# Patient Record
Sex: MALE | Race: WHITE | NOT HISPANIC OR LATINO | Employment: OTHER | URBAN - METROPOLITAN AREA
[De-identification: names, ages, dates, MRNs, and addresses within clinical notes are randomized per-mention and may not be internally consistent; named-entity substitution may affect disease eponyms.]

---

## 2023-10-22 PROBLEM — N18.31 STAGE 3A CHRONIC KIDNEY DISEASE (MULTI): Status: ACTIVE | Noted: 2023-10-22

## 2023-10-22 PROBLEM — E66.9 OBESITY: Status: ACTIVE | Noted: 2023-10-22

## 2023-10-22 PROBLEM — I10 HYPERTENSION: Status: ACTIVE | Noted: 2023-10-22

## 2023-10-22 PROBLEM — L91.8 OTHER HYPERTROPHIC DISORDERS OF THE SKIN: Status: ACTIVE | Noted: 2017-01-20

## 2023-10-22 PROBLEM — E11.9 TYPE 2 DIABETES MELLITUS (MULTI): Status: ACTIVE | Noted: 2023-10-22

## 2023-10-22 PROBLEM — N52.9 INABILITY TO ATTAIN ERECTION: Status: ACTIVE | Noted: 2023-10-22

## 2023-10-22 PROBLEM — C64.1 RENAL CELL CARCINOMA OF RIGHT KIDNEY (MULTI): Status: ACTIVE | Noted: 2023-10-22

## 2023-10-22 PROBLEM — N52.9 ORGANIC IMPOTENCE: Status: ACTIVE | Noted: 2023-10-22

## 2023-10-22 RX ORDER — ASPIRIN 81 MG
1 TABLET, DELAYED RELEASE (ENTERIC COATED) ORAL DAILY
COMMUNITY
End: 2024-05-13 | Stop reason: ALTCHOICE

## 2023-10-22 RX ORDER — ESCITALOPRAM OXALATE 10 MG/1
1 TABLET ORAL DAILY
COMMUNITY

## 2023-10-22 RX ORDER — LANOLIN ALCOHOL/MO/W.PET/CERES
1 CREAM (GRAM) TOPICAL DAILY
COMMUNITY

## 2023-10-22 RX ORDER — LISINOPRIL 10 MG/1
1 TABLET ORAL DAILY
COMMUNITY
Start: 2021-05-10 | End: 2024-05-13 | Stop reason: ALTCHOICE

## 2023-10-22 RX ORDER — LORAZEPAM 1 MG/1
1 TABLET ORAL DAILY PRN
COMMUNITY
Start: 2022-01-18 | End: 2024-05-13 | Stop reason: ALTCHOICE

## 2023-10-22 RX ORDER — LOSARTAN POTASSIUM 50 MG/1
1 TABLET ORAL DAILY
COMMUNITY

## 2023-10-23 DIAGNOSIS — C64.1 RENAL CELL CARCINOMA OF RIGHT KIDNEY (MULTI): Primary | ICD-10-CM

## 2023-10-23 RX ORDER — DIPHENHYDRAMINE HYDROCHLORIDE 50 MG/ML
50 INJECTION INTRAMUSCULAR; INTRAVENOUS AS NEEDED
Status: CANCELLED | OUTPATIENT
Start: 2023-11-21

## 2023-10-23 RX ORDER — PROCHLORPERAZINE EDISYLATE 5 MG/ML
10 INJECTION INTRAMUSCULAR; INTRAVENOUS EVERY 6 HOURS PRN
Status: CANCELLED | OUTPATIENT
Start: 2023-12-19

## 2023-10-23 RX ORDER — PROCHLORPERAZINE MALEATE 5 MG
10 TABLET ORAL EVERY 6 HOURS PRN
Status: CANCELLED | OUTPATIENT
Start: 2023-10-24

## 2023-10-23 RX ORDER — EPINEPHRINE 0.3 MG/.3ML
0.3 INJECTION SUBCUTANEOUS EVERY 5 MIN PRN
Status: CANCELLED | OUTPATIENT
Start: 2024-01-16

## 2023-10-23 RX ORDER — PROCHLORPERAZINE EDISYLATE 5 MG/ML
10 INJECTION INTRAMUSCULAR; INTRAVENOUS EVERY 6 HOURS PRN
Status: CANCELLED | OUTPATIENT
Start: 2023-10-24

## 2023-10-23 RX ORDER — FAMOTIDINE 10 MG/ML
20 INJECTION INTRAVENOUS ONCE AS NEEDED
Status: CANCELLED | OUTPATIENT
Start: 2023-10-24

## 2023-10-23 RX ORDER — EPINEPHRINE 0.3 MG/.3ML
0.3 INJECTION SUBCUTANEOUS EVERY 5 MIN PRN
Status: CANCELLED | OUTPATIENT
Start: 2024-02-13

## 2023-10-23 RX ORDER — PROCHLORPERAZINE MALEATE 5 MG
10 TABLET ORAL EVERY 6 HOURS PRN
Status: CANCELLED | OUTPATIENT
Start: 2023-12-19

## 2023-10-23 RX ORDER — LORAZEPAM 0.5 MG/1
0.5 TABLET ORAL ONCE
Status: CANCELLED
Start: 2023-12-19

## 2023-10-23 RX ORDER — LORAZEPAM 0.5 MG/1
0.5 TABLET ORAL ONCE
Status: CANCELLED
Start: 2024-01-16

## 2023-10-23 RX ORDER — HEPARIN 100 UNIT/ML
500 SYRINGE INTRAVENOUS AS NEEDED
Status: CANCELLED | OUTPATIENT
Start: 2023-10-23

## 2023-10-23 RX ORDER — ALBUTEROL SULFATE 0.83 MG/ML
3 SOLUTION RESPIRATORY (INHALATION) AS NEEDED
Status: CANCELLED | OUTPATIENT
Start: 2024-02-13

## 2023-10-23 RX ORDER — PROCHLORPERAZINE EDISYLATE 5 MG/ML
10 INJECTION INTRAMUSCULAR; INTRAVENOUS EVERY 6 HOURS PRN
Status: CANCELLED | OUTPATIENT
Start: 2023-11-21

## 2023-10-23 RX ORDER — EPINEPHRINE 0.3 MG/.3ML
0.3 INJECTION SUBCUTANEOUS EVERY 5 MIN PRN
Status: CANCELLED | OUTPATIENT
Start: 2023-12-19

## 2023-10-23 RX ORDER — LORAZEPAM 0.5 MG/1
0.5 TABLET ORAL ONCE
Status: CANCELLED
Start: 2023-10-23

## 2023-10-23 RX ORDER — FAMOTIDINE 10 MG/ML
20 INJECTION INTRAVENOUS ONCE AS NEEDED
Status: CANCELLED | OUTPATIENT
Start: 2023-11-21

## 2023-10-23 RX ORDER — PROCHLORPERAZINE EDISYLATE 5 MG/ML
10 INJECTION INTRAMUSCULAR; INTRAVENOUS EVERY 6 HOURS PRN
Status: CANCELLED | OUTPATIENT
Start: 2024-01-16

## 2023-10-23 RX ORDER — LORAZEPAM 0.5 MG/1
0.5 TABLET ORAL ONCE
Status: CANCELLED
Start: 2024-02-13

## 2023-10-23 RX ORDER — FAMOTIDINE 10 MG/ML
20 INJECTION INTRAVENOUS ONCE AS NEEDED
Status: CANCELLED | OUTPATIENT
Start: 2024-01-16

## 2023-10-23 RX ORDER — DIPHENHYDRAMINE HYDROCHLORIDE 50 MG/ML
50 INJECTION INTRAMUSCULAR; INTRAVENOUS AS NEEDED
Status: CANCELLED | OUTPATIENT
Start: 2024-01-16

## 2023-10-23 RX ORDER — FAMOTIDINE 10 MG/ML
20 INJECTION INTRAVENOUS ONCE AS NEEDED
Status: CANCELLED | OUTPATIENT
Start: 2024-02-13

## 2023-10-23 RX ORDER — DIPHENHYDRAMINE HYDROCHLORIDE 50 MG/ML
50 INJECTION INTRAMUSCULAR; INTRAVENOUS AS NEEDED
Status: CANCELLED | OUTPATIENT
Start: 2023-12-19

## 2023-10-23 RX ORDER — FAMOTIDINE 10 MG/ML
20 INJECTION INTRAVENOUS ONCE AS NEEDED
Status: CANCELLED | OUTPATIENT
Start: 2023-12-19

## 2023-10-23 RX ORDER — PROCHLORPERAZINE MALEATE 5 MG
10 TABLET ORAL EVERY 6 HOURS PRN
Status: CANCELLED | OUTPATIENT
Start: 2023-11-21

## 2023-10-23 RX ORDER — DIPHENHYDRAMINE HYDROCHLORIDE 50 MG/ML
50 INJECTION INTRAMUSCULAR; INTRAVENOUS AS NEEDED
Status: CANCELLED | OUTPATIENT
Start: 2024-02-13

## 2023-10-23 RX ORDER — LORAZEPAM 0.5 MG/1
0.5 TABLET ORAL ONCE
Status: CANCELLED
Start: 2023-11-21

## 2023-10-23 RX ORDER — ALBUTEROL SULFATE 0.83 MG/ML
3 SOLUTION RESPIRATORY (INHALATION) AS NEEDED
Status: CANCELLED | OUTPATIENT
Start: 2023-10-24

## 2023-10-23 RX ORDER — ALBUTEROL SULFATE 0.83 MG/ML
3 SOLUTION RESPIRATORY (INHALATION) AS NEEDED
Status: CANCELLED | OUTPATIENT
Start: 2023-11-21

## 2023-10-23 RX ORDER — ALBUTEROL SULFATE 0.83 MG/ML
3 SOLUTION RESPIRATORY (INHALATION) AS NEEDED
Status: CANCELLED | OUTPATIENT
Start: 2023-12-19

## 2023-10-23 RX ORDER — EPINEPHRINE 0.3 MG/.3ML
0.3 INJECTION SUBCUTANEOUS EVERY 5 MIN PRN
Status: CANCELLED | OUTPATIENT
Start: 2023-11-21

## 2023-10-23 RX ORDER — PROCHLORPERAZINE EDISYLATE 5 MG/ML
10 INJECTION INTRAMUSCULAR; INTRAVENOUS EVERY 6 HOURS PRN
Status: CANCELLED | OUTPATIENT
Start: 2024-02-13

## 2023-10-23 RX ORDER — EPINEPHRINE 0.3 MG/.3ML
0.3 INJECTION SUBCUTANEOUS EVERY 5 MIN PRN
Status: CANCELLED | OUTPATIENT
Start: 2023-10-24

## 2023-10-23 RX ORDER — ALBUTEROL SULFATE 0.83 MG/ML
3 SOLUTION RESPIRATORY (INHALATION) AS NEEDED
Status: CANCELLED | OUTPATIENT
Start: 2024-01-16

## 2023-10-23 RX ORDER — PROCHLORPERAZINE MALEATE 5 MG
10 TABLET ORAL EVERY 6 HOURS PRN
Status: CANCELLED | OUTPATIENT
Start: 2024-01-16

## 2023-10-23 RX ORDER — DIPHENHYDRAMINE HYDROCHLORIDE 50 MG/ML
50 INJECTION INTRAMUSCULAR; INTRAVENOUS AS NEEDED
Status: CANCELLED | OUTPATIENT
Start: 2023-10-24

## 2023-10-23 RX ORDER — PROCHLORPERAZINE MALEATE 5 MG
10 TABLET ORAL EVERY 6 HOURS PRN
Status: CANCELLED | OUTPATIENT
Start: 2024-02-13

## 2023-10-23 RX ORDER — HEPARIN SODIUM,PORCINE/PF 10 UNIT/ML
50 SYRINGE (ML) INTRAVENOUS AS NEEDED
Status: CANCELLED | OUTPATIENT
Start: 2023-10-23

## 2023-10-24 ENCOUNTER — INFUSION (OUTPATIENT)
Dept: HEMATOLOGY/ONCOLOGY | Facility: CLINIC | Age: 63
End: 2023-10-24
Payer: COMMERCIAL

## 2023-10-24 VITALS
HEIGHT: 69 IN | RESPIRATION RATE: 17 BRPM | TEMPERATURE: 97 F | BODY MASS INDEX: 38.6 KG/M2 | OXYGEN SATURATION: 97 % | HEART RATE: 77 BPM | DIASTOLIC BLOOD PRESSURE: 89 MMHG | SYSTOLIC BLOOD PRESSURE: 156 MMHG | WEIGHT: 260.58 LBS

## 2023-10-24 DIAGNOSIS — C64.1 RENAL CELL CARCINOMA OF RIGHT KIDNEY (MULTI): ICD-10-CM

## 2023-10-24 LAB
ALBUMIN SERPL BCP-MCNC: 4 G/DL (ref 3.4–5)
ALP SERPL-CCNC: 59 U/L (ref 33–136)
ALT SERPL W P-5'-P-CCNC: 15 U/L (ref 10–52)
ANION GAP SERPL CALC-SCNC: 13 MMOL/L (ref 10–20)
AST SERPL W P-5'-P-CCNC: 19 U/L (ref 9–39)
BASOPHILS # BLD AUTO: 0.05 X10*3/UL (ref 0–0.1)
BASOPHILS NFR BLD AUTO: 0.9 %
BILIRUB SERPL-MCNC: 0.5 MG/DL (ref 0–1.2)
BUN SERPL-MCNC: 20 MG/DL (ref 6–23)
CALCIUM SERPL-MCNC: 8.6 MG/DL (ref 8.6–10.3)
CHLORIDE SERPL-SCNC: 105 MMOL/L (ref 98–107)
CO2 SERPL-SCNC: 26 MMOL/L (ref 21–32)
CREAT SERPL-MCNC: 1.39 MG/DL (ref 0.5–1.3)
EOSINOPHIL # BLD AUTO: 0.3 X10*3/UL (ref 0–0.7)
EOSINOPHIL NFR BLD AUTO: 5.2 %
ERYTHROCYTE [DISTWIDTH] IN BLOOD BY AUTOMATED COUNT: 12.7 % (ref 11.5–14.5)
GFR SERPL CREATININE-BSD FRML MDRD: 57 ML/MIN/1.73M*2
GLUCOSE SERPL-MCNC: 132 MG/DL (ref 74–99)
HCT VFR BLD AUTO: 43.9 % (ref 41–52)
HGB BLD-MCNC: 15 G/DL (ref 13.5–17.5)
IMM GRANULOCYTES # BLD AUTO: 0.01 X10*3/UL (ref 0–0.7)
IMM GRANULOCYTES NFR BLD AUTO: 0.2 % (ref 0–0.9)
LYMPHOCYTES # BLD AUTO: 1.29 X10*3/UL (ref 1.2–4.8)
LYMPHOCYTES NFR BLD AUTO: 22.6 %
MCH RBC QN AUTO: 30.5 PG (ref 26–34)
MCHC RBC AUTO-ENTMCNC: 34.2 G/DL (ref 32–36)
MCV RBC AUTO: 89 FL (ref 80–100)
MONOCYTES # BLD AUTO: 0.56 X10*3/UL (ref 0.1–1)
MONOCYTES NFR BLD AUTO: 9.8 %
NEUTROPHILS # BLD AUTO: 3.51 X10*3/UL (ref 1.2–7.7)
NEUTROPHILS NFR BLD AUTO: 61.3 %
PLATELET # BLD AUTO: 177 X10*3/UL (ref 150–450)
PMV BLD AUTO: 9.6 FL (ref 7.5–11.5)
POTASSIUM SERPL-SCNC: 3.9 MMOL/L (ref 3.5–5.3)
PROT SERPL-MCNC: 6.4 G/DL (ref 6.4–8.2)
RBC # BLD AUTO: 4.92 X10*6/UL (ref 4.5–5.9)
SODIUM SERPL-SCNC: 140 MMOL/L (ref 136–145)
T4 FREE SERPL-MCNC: 0.87 NG/DL (ref 0.61–1.12)
T4 FREE SERPL-MCNC: 0.94 NG/DL (ref 0.61–1.12)
TSH SERPL-ACNC: 0.39 MIU/L (ref 0.44–3.98)
TSH SERPL-ACNC: 0.4 MIU/L (ref 0.44–3.98)
WBC # BLD AUTO: 5.7 X10*3/UL (ref 4.4–11.3)

## 2023-10-24 PROCEDURE — 86706 HEP B SURFACE ANTIBODY: CPT

## 2023-10-24 PROCEDURE — 80053 COMPREHEN METABOLIC PANEL: CPT

## 2023-10-24 PROCEDURE — 86704 HEP B CORE ANTIBODY TOTAL: CPT

## 2023-10-24 PROCEDURE — 87340 HEPATITIS B SURFACE AG IA: CPT

## 2023-10-24 PROCEDURE — 85025 COMPLETE CBC W/AUTO DIFF WBC: CPT

## 2023-10-24 PROCEDURE — 84439 ASSAY OF FREE THYROXINE: CPT

## 2023-10-24 PROCEDURE — 36415 COLL VENOUS BLD VENIPUNCTURE: CPT

## 2023-10-24 PROCEDURE — 82533 TOTAL CORTISOL: CPT

## 2023-10-24 PROCEDURE — 2500000004 HC RX 250 GENERAL PHARMACY W/ HCPCS (ALT 636 FOR OP/ED): Mod: JZ | Performed by: INTERNAL MEDICINE

## 2023-10-24 PROCEDURE — 2500000001 HC RX 250 WO HCPCS SELF ADMINISTERED DRUGS (ALT 637 FOR MEDICARE OP): Performed by: INTERNAL MEDICINE

## 2023-10-24 PROCEDURE — 82024 ASSAY OF ACTH: CPT

## 2023-10-24 PROCEDURE — 96375 TX/PRO/DX INJ NEW DRUG ADDON: CPT | Mod: INF

## 2023-10-24 PROCEDURE — 2500000004 HC RX 250 GENERAL PHARMACY W/ HCPCS (ALT 636 FOR OP/ED): Performed by: INTERNAL MEDICINE

## 2023-10-24 PROCEDURE — 84443 ASSAY THYROID STIM HORMONE: CPT

## 2023-10-24 PROCEDURE — 96413 CHEMO IV INFUSION 1 HR: CPT

## 2023-10-24 RX ORDER — FAMOTIDINE 10 MG/ML
20 INJECTION INTRAVENOUS ONCE AS NEEDED
Status: DISCONTINUED | OUTPATIENT
Start: 2023-10-24 | End: 2023-10-24 | Stop reason: HOSPADM

## 2023-10-24 RX ORDER — EPINEPHRINE 0.3 MG/.3ML
0.3 INJECTION SUBCUTANEOUS EVERY 5 MIN PRN
Status: DISCONTINUED | OUTPATIENT
Start: 2023-10-24 | End: 2023-10-24 | Stop reason: HOSPADM

## 2023-10-24 RX ORDER — HEPARIN SODIUM,PORCINE/PF 10 UNIT/ML
50 SYRINGE (ML) INTRAVENOUS AS NEEDED
Status: CANCELLED | OUTPATIENT
Start: 2023-10-24

## 2023-10-24 RX ORDER — HEPARIN 100 UNIT/ML
500 SYRINGE INTRAVENOUS AS NEEDED
Status: CANCELLED | OUTPATIENT
Start: 2023-10-24

## 2023-10-24 RX ORDER — DIPHENHYDRAMINE HYDROCHLORIDE 50 MG/ML
50 INJECTION INTRAMUSCULAR; INTRAVENOUS AS NEEDED
Status: DISCONTINUED | OUTPATIENT
Start: 2023-10-24 | End: 2023-10-24 | Stop reason: HOSPADM

## 2023-10-24 RX ORDER — ALBUTEROL SULFATE 0.83 MG/ML
3 SOLUTION RESPIRATORY (INHALATION) AS NEEDED
Status: DISCONTINUED | OUTPATIENT
Start: 2023-10-24 | End: 2023-10-24 | Stop reason: HOSPADM

## 2023-10-24 RX ORDER — LORAZEPAM 0.5 MG/1
0.5 TABLET ORAL ONCE
Status: COMPLETED | OUTPATIENT
Start: 2023-10-24 | End: 2023-10-24

## 2023-10-24 RX ADMIN — LORAZEPAM 0.5 MG: 0.5 TABLET ORAL at 15:45

## 2023-10-24 RX ADMIN — DEXAMETHASONE SODIUM PHOSPHATE 8 MG: 4 INJECTION INTRA-ARTICULAR; INTRALESIONAL; INTRAMUSCULAR; INTRAVENOUS; SOFT TISSUE at 15:45

## 2023-10-24 RX ADMIN — SODIUM CHLORIDE 480 MG: 9 INJECTION, SOLUTION INTRAVENOUS at 16:09

## 2023-10-24 NOTE — SIGNIFICANT EVENT
10/24/23 1535   Prechemo Checklist   Has the patient been in the hospital, ED, or urgent care since last date of service No   Chemo/Immuno Consent Signed Yes   Protocol/Indications Verified Yes   Confirmed to previous date/time of medication Yes   Compared to previous dose Yes   Pregnancy Test Negative Not applicable   BSA/Weight-Height Verified Yes   Dose Calculations Verified Yes

## 2023-10-25 LAB
CORTIS AM PEAK SERPL-MSCNC: 6.2 UG/DL (ref 5–20)
HBV CORE AB SER QL: NONREACTIVE
HBV SURFACE AB SER-ACNC: <3.1 MIU/ML
HBV SURFACE AG SERPL QL IA: NONREACTIVE

## 2023-10-26 LAB — ACTH PLAS-MCNC: 187 PG/ML (ref 7.2–63.3)

## 2023-11-14 ENCOUNTER — APPOINTMENT (OUTPATIENT)
Dept: NEPHROLOGY | Facility: CLINIC | Age: 63
End: 2023-11-14
Payer: COMMERCIAL

## 2023-11-15 ENCOUNTER — APPOINTMENT (OUTPATIENT)
Dept: NEPHROLOGY | Facility: CLINIC | Age: 63
End: 2023-11-15
Payer: COMMERCIAL

## 2023-11-20 ENCOUNTER — TELEPHONE (OUTPATIENT)
Dept: RADIATION ONCOLOGY | Facility: HOSPITAL | Age: 63
End: 2023-11-20
Payer: COMMERCIAL

## 2023-11-21 ENCOUNTER — INFUSION (OUTPATIENT)
Dept: HEMATOLOGY/ONCOLOGY | Facility: CLINIC | Age: 63
End: 2023-11-21
Payer: COMMERCIAL

## 2023-11-21 ENCOUNTER — HOSPITAL ENCOUNTER (OUTPATIENT)
Dept: RADIOLOGY | Facility: HOSPITAL | Age: 63
Discharge: HOME | End: 2023-11-21
Payer: COMMERCIAL

## 2023-11-21 ENCOUNTER — HOSPITAL ENCOUNTER (OUTPATIENT)
Dept: RADIATION ONCOLOGY | Facility: HOSPITAL | Age: 63
Setting detail: RADIATION/ONCOLOGY SERIES
Discharge: HOME | End: 2023-11-21
Payer: COMMERCIAL

## 2023-11-21 VITALS
HEIGHT: 69 IN | WEIGHT: 261.69 LBS | RESPIRATION RATE: 18 BRPM | OXYGEN SATURATION: 95 % | SYSTOLIC BLOOD PRESSURE: 144 MMHG | HEART RATE: 73 BPM | TEMPERATURE: 96.8 F | DIASTOLIC BLOOD PRESSURE: 91 MMHG | BODY MASS INDEX: 38.76 KG/M2

## 2023-11-21 VITALS
BODY MASS INDEX: 38.13 KG/M2 | SYSTOLIC BLOOD PRESSURE: 167 MMHG | DIASTOLIC BLOOD PRESSURE: 103 MMHG | RESPIRATION RATE: 16 BRPM | HEART RATE: 84 BPM | WEIGHT: 260.69 LBS | TEMPERATURE: 97.7 F | OXYGEN SATURATION: 98 %

## 2023-11-21 DIAGNOSIS — C64.1 RENAL CELL CARCINOMA OF RIGHT KIDNEY (MULTI): Primary | ICD-10-CM

## 2023-11-21 DIAGNOSIS — C78.7 SECONDARY MALIGNANT NEOPLASM OF LIVER AND INTRAHEPATIC BILE DUCT (MULTI): ICD-10-CM

## 2023-11-21 DIAGNOSIS — C64.1 RENAL CELL CARCINOMA OF RIGHT KIDNEY (MULTI): ICD-10-CM

## 2023-11-21 LAB
ALBUMIN SERPL BCP-MCNC: 3.9 G/DL (ref 3.4–5)
ALP SERPL-CCNC: 55 U/L (ref 33–136)
ALT SERPL W P-5'-P-CCNC: 16 U/L (ref 10–52)
ANION GAP SERPL CALC-SCNC: 15 MMOL/L (ref 10–20)
AST SERPL W P-5'-P-CCNC: 15 U/L (ref 9–39)
BASOPHILS # BLD AUTO: 0.06 X10*3/UL (ref 0–0.1)
BASOPHILS NFR BLD AUTO: 1.1 %
BILIRUB SERPL-MCNC: 0.4 MG/DL (ref 0–1.2)
BUN SERPL-MCNC: 18 MG/DL (ref 6–23)
CALCIUM SERPL-MCNC: 9.2 MG/DL (ref 8.6–10.3)
CHLORIDE SERPL-SCNC: 102 MMOL/L (ref 98–107)
CO2 SERPL-SCNC: 28 MMOL/L (ref 21–32)
CREAT SERPL-MCNC: 1.35 MG/DL (ref 0.5–1.3)
EOSINOPHIL # BLD AUTO: 0.29 X10*3/UL (ref 0–0.7)
EOSINOPHIL NFR BLD AUTO: 5.3 %
ERYTHROCYTE [DISTWIDTH] IN BLOOD BY AUTOMATED COUNT: 12.8 % (ref 11.5–14.5)
GFR SERPL CREATININE-BSD FRML MDRD: 59 ML/MIN/1.73M*2
GLUCOSE SERPL-MCNC: 153 MG/DL (ref 74–99)
HCT VFR BLD AUTO: 44.7 % (ref 41–52)
HGB BLD-MCNC: 15.3 G/DL (ref 13.5–17.5)
IMM GRANULOCYTES # BLD AUTO: 0.03 X10*3/UL (ref 0–0.7)
IMM GRANULOCYTES NFR BLD AUTO: 0.6 % (ref 0–0.9)
LYMPHOCYTES # BLD AUTO: 1.28 X10*3/UL (ref 1.2–4.8)
LYMPHOCYTES NFR BLD AUTO: 23.6 %
MCH RBC QN AUTO: 30.7 PG (ref 26–34)
MCHC RBC AUTO-ENTMCNC: 34.2 G/DL (ref 32–36)
MCV RBC AUTO: 90 FL (ref 80–100)
MONOCYTES # BLD AUTO: 0.37 X10*3/UL (ref 0.1–1)
MONOCYTES NFR BLD AUTO: 6.8 %
NEUTROPHILS # BLD AUTO: 3.4 X10*3/UL (ref 1.2–7.7)
NEUTROPHILS NFR BLD AUTO: 62.6 %
NRBC BLD-RTO: 0 /100 WBCS (ref 0–0)
PLATELET # BLD AUTO: 204 X10*3/UL (ref 150–450)
POTASSIUM SERPL-SCNC: 3.7 MMOL/L (ref 3.5–5.3)
PROT SERPL-MCNC: 6.4 G/DL (ref 6.4–8.2)
RBC # BLD AUTO: 4.99 X10*6/UL (ref 4.5–5.9)
SODIUM SERPL-SCNC: 141 MMOL/L (ref 136–145)
TSH SERPL-ACNC: 0.56 MIU/L (ref 0.44–3.98)
WBC # BLD AUTO: 5.4 X10*3/UL (ref 4.4–11.3)

## 2023-11-21 PROCEDURE — 36415 COLL VENOUS BLD VENIPUNCTURE: CPT

## 2023-11-21 PROCEDURE — 99213 OFFICE O/P EST LOW 20 MIN: CPT | Mod: GC | Performed by: RADIOLOGY

## 2023-11-21 PROCEDURE — 85025 COMPLETE CBC W/AUTO DIFF WBC: CPT

## 2023-11-21 PROCEDURE — 74177 CT ABD & PELVIS W/CONTRAST: CPT | Performed by: STUDENT IN AN ORGANIZED HEALTH CARE EDUCATION/TRAINING PROGRAM

## 2023-11-21 PROCEDURE — 2550000001 HC RX 255 CONTRASTS: Performed by: RADIOLOGY

## 2023-11-21 PROCEDURE — 82024 ASSAY OF ACTH: CPT

## 2023-11-21 PROCEDURE — 96413 CHEMO IV INFUSION 1 HR: CPT

## 2023-11-21 PROCEDURE — 82533 TOTAL CORTISOL: CPT

## 2023-11-21 PROCEDURE — 80053 COMPREHEN METABOLIC PANEL: CPT

## 2023-11-21 PROCEDURE — 2500000004 HC RX 250 GENERAL PHARMACY W/ HCPCS (ALT 636 FOR OP/ED): Performed by: INTERNAL MEDICINE

## 2023-11-21 PROCEDURE — 2500000001 HC RX 250 WO HCPCS SELF ADMINISTERED DRUGS (ALT 637 FOR MEDICARE OP): Performed by: INTERNAL MEDICINE

## 2023-11-21 PROCEDURE — 96375 TX/PRO/DX INJ NEW DRUG ADDON: CPT | Mod: INF

## 2023-11-21 PROCEDURE — 84443 ASSAY THYROID STIM HORMONE: CPT

## 2023-11-21 PROCEDURE — 74177 CT ABD & PELVIS W/CONTRAST: CPT

## 2023-11-21 PROCEDURE — 2500000004 HC RX 250 GENERAL PHARMACY W/ HCPCS (ALT 636 FOR OP/ED): Mod: JZ | Performed by: INTERNAL MEDICINE

## 2023-11-21 PROCEDURE — 99213 OFFICE O/P EST LOW 20 MIN: CPT | Performed by: RADIOLOGY

## 2023-11-21 RX ORDER — ALBUTEROL SULFATE 0.83 MG/ML
3 SOLUTION RESPIRATORY (INHALATION) AS NEEDED
Status: DISCONTINUED | OUTPATIENT
Start: 2023-11-21 | End: 2023-11-21 | Stop reason: HOSPADM

## 2023-11-21 RX ORDER — PROCHLORPERAZINE MALEATE 10 MG
10 TABLET ORAL EVERY 6 HOURS PRN
Status: DISCONTINUED | OUTPATIENT
Start: 2023-11-21 | End: 2023-11-21 | Stop reason: HOSPADM

## 2023-11-21 RX ORDER — FAMOTIDINE 10 MG/ML
20 INJECTION INTRAVENOUS ONCE AS NEEDED
Status: DISCONTINUED | OUTPATIENT
Start: 2023-11-21 | End: 2023-11-21 | Stop reason: HOSPADM

## 2023-11-21 RX ORDER — EPINEPHRINE 0.3 MG/.3ML
0.3 INJECTION SUBCUTANEOUS EVERY 5 MIN PRN
Status: DISCONTINUED | OUTPATIENT
Start: 2023-11-21 | End: 2023-11-21 | Stop reason: HOSPADM

## 2023-11-21 RX ORDER — DIPHENHYDRAMINE HYDROCHLORIDE 50 MG/ML
50 INJECTION INTRAMUSCULAR; INTRAVENOUS AS NEEDED
Status: DISCONTINUED | OUTPATIENT
Start: 2023-11-21 | End: 2023-11-21 | Stop reason: HOSPADM

## 2023-11-21 RX ORDER — PROCHLORPERAZINE EDISYLATE 5 MG/ML
10 INJECTION INTRAMUSCULAR; INTRAVENOUS EVERY 6 HOURS PRN
Status: DISCONTINUED | OUTPATIENT
Start: 2023-11-21 | End: 2023-11-21 | Stop reason: HOSPADM

## 2023-11-21 RX ORDER — LORAZEPAM 0.5 MG/1
0.5 TABLET ORAL ONCE
Status: COMPLETED | OUTPATIENT
Start: 2023-11-21 | End: 2023-11-21

## 2023-11-21 RX ORDER — DEXAMETHASONE SODIUM PHOSPHATE 4 MG/ML
8 INJECTION, SOLUTION INTRA-ARTICULAR; INTRALESIONAL; INTRAMUSCULAR; INTRAVENOUS; SOFT TISSUE ONCE
Status: COMPLETED | OUTPATIENT
Start: 2023-11-21 | End: 2023-11-21

## 2023-11-21 RX ADMIN — IOHEXOL 75 ML: 350 INJECTION, SOLUTION INTRAVENOUS at 11:05

## 2023-11-21 RX ADMIN — LORAZEPAM 0.5 MG: 0.5 TABLET ORAL at 15:49

## 2023-11-21 RX ADMIN — SODIUM CHLORIDE 480 MG: 9 INJECTION, SOLUTION INTRAVENOUS at 16:07

## 2023-11-21 RX ADMIN — DEXAMETHASONE SODIUM PHOSPHATE 8 MG: 4 INJECTION INTRA-ARTICULAR; INTRALESIONAL; INTRAMUSCULAR; INTRAVENOUS; SOFT TISSUE at 15:51

## 2023-11-21 ASSESSMENT — PATIENT HEALTH QUESTIONNAIRE - PHQ9
1. LITTLE INTEREST OR PLEASURE IN DOING THINGS: NOT AT ALL
SUM OF ALL RESPONSES TO PHQ9 QUESTIONS 1 AND 2: 0
2. FEELING DOWN, DEPRESSED OR HOPELESS: NOT AT ALL

## 2023-11-21 ASSESSMENT — ENCOUNTER SYMPTOMS
LOSS OF SENSATION IN FEET: 0
DEPRESSION: 0
OCCASIONAL FEELINGS OF UNSTEADINESS: 0

## 2023-11-21 ASSESSMENT — COLUMBIA-SUICIDE SEVERITY RATING SCALE - C-SSRS
1. IN THE PAST MONTH, HAVE YOU WISHED YOU WERE DEAD OR WISHED YOU COULD GO TO SLEEP AND NOT WAKE UP?: NO
2. HAVE YOU ACTUALLY HAD ANY THOUGHTS OF KILLING YOURSELF?: NO
6. HAVE YOU EVER DONE ANYTHING, STARTED TO DO ANYTHING, OR PREPARED TO DO ANYTHING TO END YOUR LIFE?: NO

## 2023-11-21 ASSESSMENT — PAIN SCALES - GENERAL: PAINLEVEL: 0-NO PAIN

## 2023-11-21 NOTE — SIGNIFICANT EVENT
11/21/23 1532   Prechemo Checklist   Has the patient been in the hospital, ED, or urgent care since last date of service No   Chemo/Immuno Consent Signed Yes   Protocol/Indications Verified Yes   Confirmed to previous date/time of medication Yes   Compared to previous dose Yes   All medications are dated accurately Yes   Pregnancy Test Negative Not applicable   Parameters Met Yes   BSA/Weight-Height Verified Yes   Dose Calculations Verified Yes

## 2023-11-21 NOTE — PROGRESS NOTES
Staff Physician: Breana Arora MD  Referring Physician: Jadiel Gonsalez MD  Date of Service: 11/21/2023  RADIATION ONCOLOGY FOLLOW UP NOTE  IDENTIFYING DATA:   Cancer Staging   No matching staging information was found for the patient.  Problem List Items Addressed This Visit       Renal cell carcinoma of right kidney (CMS/HCC) - Primary    Relevant Orders    CT chest abdomen pelvis w IV contrast    CBC and Auto Differential    Comprehensive Metabolic Panel    Clinic Appointment Request Follow Up; BREANA ARORA; [facility] [address] [facility]       Mr. Burns is a 62-year-old man with originally [id number] Stage III renal cell carcinoma, status post right nephrectomy, with subsequent recurrence at the resection bed with direct extension to the liver, status-post resection and partial hepatectomy,  then placed on immunotherapy, who developed a second recurrence in the soft tissue at the nephrectomy bed. He then completed SBRT to 50Gy in 5fx to the soft tissue recurrence (5.6cm in max diameter at that time), with COT on 05/19/2023. He presents today for routine interval follow-up.    INTERVAL HISTORY  Since we last saw Jason Burns in clinic on 8/22/23, he has felt well. His weight is stable from last visit and His appetite is unchanged. He denies any significant symptoms at this time. Specifically, he denies abdominal pain, nausea, vomiting, diarrhea, jaundice, dark urine, or light stools. He has had no new medical problems or medications since our last visit. His last imaging, comprising CT A/P on 11/21/23, demonstrates interval decrease in size in the soft-tissue recurrence, now measuring nearly 1.6x1.5cm from prior (pending final radiology read).       PAST MEDICAL, SURGICAL, FAMILY, AND SOCIAL HISTORY:  Past Medical History:   Diagnosis Date    Campylobacter enteritis 11/02/2018    Campylobacter gastroenteritis    Personal history of other diseases of the digestive system 08/11/2016    History of umbilical hernia     Plantar fascial fibromatosis 08/19/2015    Plantar fasciitis    Renal cell cancer (CMS/HCC)     Unspecified sprain of unspecified great toe, initial encounter 08/19/2015    Sprain of great toe     Past Surgical History:   Procedure Laterality Date    COLONOSCOPY  10/21/2013    Complete Colonoscopy    CT ABDOMEN PELVIS ANGIOGRAM W AND/OR WO IV CONTRAST  2/7/2021    CT ABDOMEN PELVIS ANGIOGRAM W AND/OR WO IV CONTRAST 2/7/2021 [facility]    CT ABDOMEN PELVIS ANGIOGRAM W AND/OR WO IV CONTRAST  2/10/2021    CT ABDOMEN PELVIS ANGIOGRAM W AND/OR WO IV CONTRAST 2/10/2021 [facility]    UMBILICAL HERNIA REPAIR  04/25/2018    Umbilical Hernia Repair    US GUIDED NEEDLE LIVER BIOPSY  10/12/2020    US GUIDED NEEDLE LIVER BIOPSY 10/12/2020 [facility]     ALLERGIES:  No Known Allergies  MEDICATIONS:    Current Outpatient Medications:     cyanocobalamin (Vitamin B-12) 1,000 mcg tablet, Take 1 tablet (1,000 mcg) by mouth once daily., Disp: , Rfl:     escitalopram (Lexapro) 10 mg tablet, Take 1 tablet (10 mg) by mouth once daily., Disp: , Rfl:     lisinopril 10 mg tablet, Take 1 tablet (10 mg) by mouth once daily., Disp: , Rfl:     losartan (Cozaar) 50 mg tablet, Take 1 tablet (50 mg) by mouth once daily., Disp: , Rfl:     multivitamin (Daily Vitamin Formula) tablet, Take 1 tablet by mouth once daily., Disp: , Rfl:     amLODIPine (Norvasc) 10 mg tablet, Take 1 tablet (10 mg) by mouth once daily., Disp: 90 tablet, Rfl: 3    LORazepam (Ativan) 1 mg tablet, Take 1 tablet (1 mg) by mouth once daily as needed., Disp: , Rfl:     TIRZEPATIDE SUBQ, Inject under the skin 1 (one) time per week., Disp: , Rfl:      REVIEW OF SYSTEMS:  Except for the symptoms described in the interval history, the review of systems is negative. Specifically, except as noted, when asked the patient expressed no complaints relative to constitutional (fever, weight loss), eyes, ears, nose, mouth, throat, neurologic, cardiovascular,  "pulmonary, breast, GI, , skin, musculoskeletal, endocrine, hematologic/lymphatic, or immunologic systems.    PERFORMANCE STATUS:  Karnofsky Performance Score/ECO, Normal activity with effort; some signs or symptoms of disease (ECOG equivalent 1)    PHYSICAL EXAMINATION:  BP (!) 144/91 (BP Location: Left arm, Patient Position: Standing, BP Cuff Size: Large adult)   Pulse 73   Temp 36 °C (96.8 °F) (Temporal)   Resp 18   Ht 1.761 m (5' 9.33\")   Wt 119 kg (261 lb 11 oz)   SpO2 95%   BMI 38.28 kg/m²   Pain score: 0/10  General: no acute distress, engaged in conversation. No jaundice.  HEENT: Normocephalic, atraumatic. Extraocular movements are intact.   Neck: supple with trachea at midline, no palpable adenopathy.   Pulmonary: Breathing comfortably on room air, no respiratory distress  Cardiovascular: Regular rate, no cyanosis, well-perfused  Abdomen: Soft, nontender, nondistended.   Extremities: No lower extremity edema or cyanosis.   Musculoskeletal: Normal range of motion. Able to raise both arms above head without issues  Skin: Without rash or obvious lesions.   Neurologic: Alert and oriented x3. Cranial nerves grossly intact.       DIAGNOSTIC REPORTS REVIEWED:  Imaging: All imaging was personally reviewed and interpreted in clinic. Findings as per interval history and EMR.  Laboratory/Pathology:  All pertinent labs and pathology were personally reviewed and interpreted in clinic. Findings as per interval history and EMR.   No results found for: \"CEA\" No results found for: \"AFP\"    IMPRESSION:  Ms. Burns has recovered nicely from radiation therapy without residual adverse effects and with substantial imaging related response (pending final radiology read).     PLAN:  I have asked Jason Burns to please return to clinic in 3 month's time with a repeat imaging of CT CAP at that time. He knows to call with any questions or concerns in the interim.    PAIN PLAN: The patient reports their pain " is well-controlled on their current regimen.  Their pain regimen is currently managed by Primary Care.      DISEASE STATUS: Controlled  NEW METACHRONOUS CANCER: No    Thank you for the opportunity to participate in the ongoing care of this pleasant man.    David Mcfarlane MD  PGY-3 Radiation Oncology  11/21/2023

## 2023-11-22 LAB — CORTIS AM PEAK SERPL-MSCNC: 3.7 UG/DL (ref 5–20)

## 2023-11-23 LAB — ACTH PLAS-MCNC: 35.4 PG/ML (ref 7.2–63.3)

## 2023-11-30 ENCOUNTER — TELEMEDICINE (OUTPATIENT)
Dept: NEPHROLOGY | Facility: CLINIC | Age: 63
End: 2023-11-30
Payer: COMMERCIAL

## 2023-11-30 VITALS — WEIGHT: 255 LBS | HEIGHT: 70 IN | RESPIRATION RATE: 16 BRPM | BODY MASS INDEX: 36.51 KG/M2

## 2023-11-30 DIAGNOSIS — C64.1 RENAL CELL CARCINOMA OF RIGHT KIDNEY (MULTI): ICD-10-CM

## 2023-11-30 DIAGNOSIS — E11.00 TYPE 2 DIABETES MELLITUS WITH HYPEROSMOLARITY WITHOUT COMA, WITHOUT LONG-TERM CURRENT USE OF INSULIN (MULTI): ICD-10-CM

## 2023-11-30 DIAGNOSIS — N18.31 STAGE 3A CHRONIC KIDNEY DISEASE (MULTI): Primary | ICD-10-CM

## 2023-11-30 PROCEDURE — 99215 OFFICE O/P EST HI 40 MIN: CPT | Performed by: INTERNAL MEDICINE

## 2023-11-30 RX ORDER — AMLODIPINE BESYLATE 10 MG/1
10 TABLET ORAL DAILY
Qty: 90 TABLET | Refills: 3 | Status: SHIPPED | OUTPATIENT
Start: 2023-11-30 | End: 2024-05-13 | Stop reason: ALTCHOICE

## 2023-11-30 NOTE — PATIENT INSTRUCTIONS
Dear JENNIFER   It was nice seeing you in the nephrology clinic today     Today we discussed the following:     #Chronic kidney disease kidney function is 50% after removal of 1 kidney. Looks to be doing very well with kidney function improved up to 59% based on blood work done November 2023  #Blood pressure is elevated above target.  Continue losartan 50 mg.  Starting amlodipine 10 mg at bedtime.  Please send me 2 weeks blood pressure log to review     #You have some protein leak which is not clinically significant. We will continue to monitor     - Please follow healthy life style, watch salt in diet, avoid soy sauce and processed meat, limit soda drinks. Exercise regularly. No smoking. Check your blood pressure daily - same time of the day - and write numbers down. Please bring log with you next visit.     Follow-up in 12 months with another set of blood work and urine analysis        Please call our office if you have any question  Thank you for coming to see me today     Vasquez Trejo MD, MS, XIOMY ANDRADE  Clinical  - [facility] [employer]  Nephrologist - [facility] - [facility]

## 2023-12-01 NOTE — PROGRESS NOTES
Subjective     Mr. Burns is 63 year-old white male with past medical history of right renal cell carcinoma status post right nephrectomy in May 2018, with liver metastasis status post partial segmentectomy, hypertension, type 2 diabetes on no medications who is coming to see me today for CKD [id number] visit    Last office visit November 2022.  In the interim, no changes in medical condition.  Jason was evaluated virtually today.  No kidney related complaints or concerns.  He continues to be on opdivio for RCC.  Recent blood work done few weeks ago showed stable/improved serum creatinine and GFR.  Within normal lecture lites.  Blood pressure remains to be elevated.  Good adherence to losartan.    prior notes     Last office visit May 2022. In interim, he continues to be on review. He is doing well. He feels in his baseline state of health. He just came back from [place] and he drove more than 8 hours. He did not take his lisinopril recently. Blood pressure is elevated today-asymptomatic. He had blood work done few weeks ago and all results were discussed with him in details including stable serum creatinine 1.6 and GFR 50. Within normal electrolytes. Overall he is doing great     Her prior notes     Last office visit November 2021. In the interim, he had exploratory laparotomy with adhesion lysis and retroperitoneal mass removal. Surgery went okay with no immediate complications. He continues to be on immunotherapy per heme-onc. He is responding well to therapy. Today, Jason came alone. He feels better than ever. He is energetic. He has good appetite. He is back to his baseline. He did blood work yesterday and all results were discussed with him in detail today including stable serum creatinine and GFR, no anemia, good diabetes control on no medications. Blood pressure is accepted today on lisinopril. No lower urinary tract symptoms. No concerns or complaints today        Per prior notes  Last  office visit May 2021. Blood pressure was elevated. Restarted lisinopril 10 mg. 2 weeks repeat kidney function panel with stable serum creatinine and GFR. In the interim, he continues to follow closely with hematology/oncology. He continues to be on Opdivo/immunotherapy with no significant complications. He is planned to get MRI done next week for enlarging liver lesion. Blood pressure seems to be in target. Repeat blood work showed stable serum creatinine and GFR. There is mild albuminuria noticed. He feels well in his good state of health. No lower urinary tract symptoms. No leg swelling or shortness of breath. No NSAID use at home. No active renal issues or concerns     Per prior notes     Patient has baseline serum creatinine 1.5, GFR 50 mils per minute per 1.73 mÂ². He had renal cell carcinoma diagnosed in May 2018 status post nephrectomy. At that time, he had LAURIE with serum creatinine peaked to 3.2 and GFR down to 28 mils per minute per 1.73 after nephrectomy. Serum creatinine recovered to baseline 1.5. He had another LAURIE in October 2018 with serum creatinine peaked 2.25, GFR 30 mils per minute per 1.73 mÂ². Serum creatinine continued to recover since then and back to baseline of 1.5.     Chemotherapy includes Opdivo/nivolumab [PD/L1 inhibitor] + Yervoy [ Ipilimumab] combination for 3 months. Now only on Opdivo. Immunotherapy was complicated with possible immune mediated enteritis and massive GI bleed. He had severe anemia with hemoglobin dropped to 4.4 in February 2021 that mandated multiple blood transfusions , iron infusion and multiple scopes. Patient was treated with prednisone taper.     No c/o or concerns today. Urinating frequently. He reports pain/arthritis - no NSAIDs use. He was on Lisinopril for HTN that was stopped in 2/2021 due to LAURIE. He does check BP at home ~140/90. He follows low salt diet.      Social: used to work in manufacturing /designing robots, never smoker,  and lives with wife  "who has cancer thyroid, 3 daughters      Objective   Resp 16   Ht 1.778 m (5' 10\")   Wt 116 kg (255 lb)   BMI 36.59 kg/m²   Wt Readings from Last 3 Encounters:   11/30/23 116 kg (255 lb)   11/21/23 119 kg (261 lb 11 oz)   11/21/23 118 kg (260 lb 11.1 oz)       Physical Exam    General appearance: no distress awake and alert on room air, euvolemic on exam  Limited exam due to virtual visit       Review of Systems     Constitutional: no fever, no chills, no recent weight gain and no recent weight loss.   Eyes: no blurred vision and no diplopia.   ENT: no hearing loss, no earache, no sore throat, no swollen glands in the neck and no nasal discharge.   Cardiovascular: no chest pain, no palpitations and no lower extremity edema.   Respiratory: no shortness of breath, no chronic cough and no shortness of breath during exertion.   Gastrointestinal: no abdominal pain, no constipation, no heartburn, no vomiting, no bloody stools and no change in bowel movements.   Genitourinary: no dysuria and no hematuria.   Musculoskeletal: no arthralgias and no myalgias.   Skin: no rashes and no skin lesions.   Neurological: no headaches and no dizziness.   Psychiatric: no confusion, no depression and no anxiety.   Endocrine: no heat intolerance, no cold intolerance, appetite not increased, no thyroid disorder, no increased urinary frequency and no dry skin.   Hematologic/Lymphatic: does not bleed easily and does not bruise easily.   All other systems have been reviewed and are negative for complaint.         Data Review                     Lab Results   Component Value Date    HGBA1C 6.6 (A) 05/10/2022         Lab Results   Component Value Date    CALCIUM 9.2 11/21/2023    PHOS 3.6 01/03/2022         No results found for requested labs within last 365 days.              No lab exists for component: \"CR\", \"PHOSPHORUS\"          Albumin/Creatine Ratio   Date Value Ref Range Status   10/26/2021 231.6 (H) 0.0 - 30.0 ug/mg crt Final "       Recent Labs     11/21/23  1433 10/24/23  1434 09/26/23  0828 08/29/23  1519 08/23/23  0736 08/21/23  0742 08/01/23  0820 01/18/22  1425 01/03/22  0605 01/02/22  0558 01/01/22  0508 12/30/21  2229 11/23/21  0840 10/26/21  1420 02/17/21  1320 02/14/21  0542 02/09/21  0836 02/08/21  0545 02/07/21  1450    140 141 CANCELED CANCELED 139 139   < > 140 140   < > 137   < > 137   < > 138   < > 137 140   K 3.7 3.9 3.6 CANCELED CANCELED 4.2 4.1   < > 3.5 3.6   < > 4.0   < > 4.9   < > 4.5   < > 4.5 4.8   CO2 28 26 27 CANCELED CANCELED 26 27   < > 26 29   < > 28   < > 25   < > 25   < > 25 25   BUN 18 20 16 CANCELED CANCELED 22 16   < > 10 14   < > 15   < > 24*   < > 18   < > 25* 33*   GLUCOSE 153* 132* 97 CANCELED CANCELED 121* 146*   < > 129* 141*   < > 155*   < > 92   < > 174*   < > 105* 122*   CALCIUM 9.2 8.6 9.2 CANCELED CANCELED 9.1 9.2   < > 8.6 8.5*   < > 9.8   < > 9.6   < > 8.1*   < > 8.0* 8.0*   PHOS  --   --   --   --   --   --   --   --  3.6 3.4  --  2.1*  --  3.8  --  2.8  --  3.5 3.2   CREATININE 1.35* 1.39* 1.44* CANCELED CANCELED 1.61* 1.35*   < > 1.20 1.32*   < > 1.38*   < > 1.38*   < > 1.54*   < > 1.69* 1.63*   EGFR 59* 57*  --   --   --   --   --   --   --   --   --   --   --   --   --   --   --   --   --    ANIONGAP 15 13 11 CANCELED CANCELED 13 12   < > 16 12   < > 14   < > 16   < > 11   < > 10 12    < > = values in this interval not displayed.        CT abdomen pelvis w IV contrast    Result Date: 11/25/2023  Interpreted By:  Murphy Cannon,  and Arron Gallardo STUDY: CT ABDOMEN PELVIS W IV CONTRAST;  11/21/2023 11:05 am   INDICATION: Signs/Symptoms: s/p radiation for renal cell ca  C78.7: Metastatic renal cell carcinoma to liver. Per EMR: History of right-sided renal cell carcinoma status post right nephrectomy in May 2018 and adjuvant Ipi/Nivo (4 cycles). Patient had disease recurrence seen on CT from October 2021 status post resection of residual disease in the liver and nephrectomy bed in  December 2021. Patient currently on maintenance Opdivo with stable appearance of oligometastatic disease in the right upper quadrant on prior exams. Patient is now status post radiation to the surgical bed.   COMPARISON: CT cap 08/22/2023   ACCESSION NUMBER(S): [id number]   ORDERING CLINICIAN: PETER LUNA   TECHNIQUE: CT of the abdomen and pelvis was performed.  Standard contiguous axial images were obtained at 3 mm slice thickness through the abdomen and pelvis. Coronal and sagittal reconstructions at 3 mm slice thickness were performed.   75 ml of contrast Omnipaque 350 were administered intravenously without immediate complication.   FINDINGS: LOWER CHEST: The visualized lung base is unremarkable. The heart is normal in size without pericardial effusion. No pleural effusion is present. Visualized distal esophagus appears normal.   ABDOMEN:   LIVER: There are stable postsurgical changes compatible with partial resection of hepatic segments 6 and 7. No new liver lesion.   BILE DUCTS: The intrahepatic and extrahepatic ducts are not dilated.   GALLBLADDER: Unchanged cholelithiasis without evidence for acute cholecystitis.   PANCREAS: The pancreas appears unremarkable without evidence of ductal dilatation or masses.   SPLEEN: The spleen is normal in size without focal lesions.   ADRENAL GLANDS: The left adrenal gland is normal. The right adrenal gland is surgically absent.   KIDNEYS AND URETERS: There are postsurgical changes status post right nephrectomy previously noted soft tissue nodules within the nephrectomy bed are stable to slightly decreased in size compared to prior including two 8 mm soft tissue nodules (series 2, images 63-64 of 195). No new nodules within the surgical bed. Left kidney is normal in size and demonstrates multiple hypoattenuating lesions, a few of which are too small to characterize but likely represent simple cysts.   PELVIS:   BLADDER: The urinary bladder appears normal without abnormal  wall thickening.   REPRODUCTIVE ORGANS: The prostate is again mildly enlarged measuring 5.6 x 4.4 cm.   BOWEL: The stomach is unremarkable.   The small and large bowel are normal in caliber and demonstrate no wall thickening. Multiple sigmoid diverticula are seen without evidence for acute diverticulitis. The appendix appears normal.   VESSELS: There is no aneurysmal dilatation of the abdominal aorta. The IVC appears normal.   PERITONEUM/RETROPERITONEUM/LYMPH NODES: There is no free or loculated fluid collection, no free intraperitoneal air. The retroperitoneum appears normal.  There is continued interval decrease in size of a soft tissue mesenteric implant within the right lower quadrant now measuring 1.5 x 1.6 cm, previously 1.8 x 1.7 cm (series 2, image 61/195). The previously noted adjacent nodularity within the mesentery appear less conspicuous on this examination. No new peritoneal deposits or abdominopelvic lymphadenopathy.   BONES AND ABDOMINAL WALL: No suspicious osseous lesions are identified. Degenerative discogenic disease is noted in the lower thoracic and lumbar spine. Postsurgical changes within the anterior midline abdominal wall are again noted. Otherwise, the abdominal wall soft tissues are unremarkable.       Renal cell carcinoma restaging scan as compared to prior CT from 08/22/2023:   1. Postsurgical changes of right nephrectomy with interval improvement of disease burden within the nephrectomy bed and peritoneum as detailed above. No new sites of metastatic disease within the chest, abdomen, or pelvis. 2. Additional chronic findings as described above.   I personally reviewed the images/study and I agree with Dr. Paulina Heller and the findings as stated.   MACRO: None   Signed by: Murphy Cannon 11/25/2023 11:02 AM Dictation workstation:   [id number]    CT abdomen pelvis w IV contrast    Result Date: 11/25/2023  #: [phone], Rad Selected: Y Interpreted By:  Murphy Cannon,  and Arron  [facility] ST[facility]: CT ABDOMEN PELVIS W IV CONTRAST;  11/21/2023 11:05 am    INDICATION: Signs/Symptoms: s/p radiation for renal cell ca  C78.7: Metastatic renal cell carcinoma to liver. Per EMR: History of right-sided renal cell carcinoma status post right nephrectomy in May 2018 and adjuvant Ipi/Nivo (4 cycles). Patient had disease recurrence seen on CT from October 2021 status post resection of residual disease in the liver and nephrectomy bed in December 2021. Patient currently on maintenance Opdivo with stable appearance of oligometastatic disease in the right upper quadrant on prior exams. Patient is now status post radiation to the surgical bed.    COMPARISON: CT cap 08/22/2023    ACCESSION NUMBER(S): [id number]    ORDERING CLINICIAN: PETER LUNA    TECHNIQUE: CT of the abdomen and pelvis was performed.  Standard contiguous axial images were obtained at 3 mm slice thickness through the abdomen and pelvis. Coronal and sagittal reconstructions at 3 mm slice thickness were performed.    75 ml of contrast Omnipaque 350 were administered intravenously without immediate complication.    FINDINGS: LOWER CHEST: The visualized lung base is unremarkable. The heart is normal in size without pericardial effusion. No pleural effusion is present. Visualized distal esophagus appears normal.    ABDOMEN:    LIVER: There are stable postsurgical changes compatible with partial resection of hepatic segments 6 and 7. No new liver lesion.    BILE DUCTS: The intrahepatic and extrahepatic ducts are not dilated.    GALLBLADDER: Unchanged cholelithiasis without evidence for acute cholecystitis.    PANCREAS: The pancreas appears unremarkable without evidence of ductal dilatation or masses.    SPLEEN: The spleen is normal in size without focal lesions.    ADRENAL GLANDS: The left adrenal gland is normal. The right adrenal gland is surgically absent.    KIDNEYS AND URETERS: There are postsurgical changes status post right nephrectomy  previously noted soft tissue nodules within the nephrectomy bed are stable to slightly decreased in size compared to prior including two 8 mm soft tissue nodules (series 2, images 63-64 of 195). No new nodules within the surgical bed. Left kidney is normal in size and demonstrates multiple hypoattenuating lesions, a few of which are too small to characterize but likely represent simple cysts.    PELVIS:    BLADDER: The urinary bladder appears normal without abnormal wall thickening.    REPRODUCTIVE ORGANS: The prostate is again mildly enlarged measuring 5.6 x 4.4 cm.    BOWEL: The stomach is unremarkable.   The small and large bowel are normal in caliber and demonstrate no wall thickening. Multiple sigmoid diverticula are seen without evidence for acute diverticulitis. The appendix appears normal.    VESSELS: There is no aneurysmal dilatation of the abdominal aorta. The IVC appears normal.    PERITONEUM/RETROPERITONEUM/LYMPH NODES: There is no free or loculated fluid collection, no free intraperitoneal air. The retroperitoneum appears normal.  There is continued interval decrease in size of a soft tissue mesenteric implant within the right lower quadrant now measuring 1.5 x 1.6 cm, previously 1.8 x 1.7 cm (series 2, image 61/195). The previously noted adjacent nodularity within the mesentery appear less conspicuous on this examination. No new peritoneal deposits or abdominopelvic lymphadenopathy.    BONES AND ABDOMINAL WALL: No suspicious osseous lesions are identified. Degenerative discogenic disease is noted in the lower thoracic and lumbar spine. Postsurgical changes within the anterior midline abdominal wall are again noted. Otherwise, the abdominal wall soft tissues are unremarkable.    IMPRESSION: Renal cell carcinoma restaging scan as compared to prior CT from 08/22/2023:    1. Postsurgical changes of right nephrectomy with interval improvement of disease burden within the nephrectomy bed and peritoneum as  detailed above. No new sites of metastatic disease within the chest, abdomen, or pelvis. 2. Additional chronic findings as described above.    I personally reviewed the images/study and I agree with Dr. Paulina Heller and the findings as stated.    MACRO: None    Signed by: Murphy Cannon 11/25/2023 11:02 AM Dictation workstation:   [id number]        Assessment and Plan          Mr. Burns is 63 year-old white male with past medical history of right renal cell carcinoma status post right nephrectomy in May 2018, with liver metastasis status post partial segmentectomy, hypertension, type 2 diabetes on no medications who is coming to see me today for CKD [id number] visit     Impression  # Chronic kidney disease -G3 a/A2  Baseline serum creatinine 1.5, GFR 50 mils per minute per 1.73 mÂ². CKD is most likely related to part prior nephrectomy and atherosclerotic cardiovascular disease. Serum creatinine seems to be stable at this time.  -Urine dipstick in office earlier showed no Albumin, no blood and no signs of infection. Spot test albumin creatinine ratio is 0.2g/g, spot test protein creatinine ratio 0.4 g/g-we will monitor  -Prior CT scan with contrast done in April 2021 was reviewed. Left kidney was numerous cysts. Liver lesion is getting smaller.  Repeat CAT scan done in November 2023 was reviewed-no recent activity  -Accepted sodium, potassium and no significant acidosis     No evidence of immunotherapy adverse effect on the kidneys at this time. Our target is to continue to keep GFR stable as possible. Today went over conservative measures, low-salt diet, optimal blood pressure control and restarting of RAAS inhibitors     #Renal cell carcinoma status post right nephrectomy in 2018. Liver metastasis seems to be responding to immunotherapy. Chemotherapy includes Opdivo/nivolumab [PD/L1 inhibitor] + Yervoy [ Ipilimumab] combination for 3 months. Now only on Opdivo. Immunotherapy was complicated with  possible immune mediated enteritis and massive GI bleed. He had severe anemia with hemoglobin dropped to 4.4 in February 2021 that mandated multiple blood transfusions , iron infusion and multiple scopes. Patient was treated with prednisone taper.  Currently no anemia     # BP above target today-discussed medication adherence. Current medication is losartan 50 mg.  Will add amlodipine 10 mg at bedtime.  Instructed to send me 2 weeks blood pressure log to review        #Anemia Hb 15-16     #BMD   -Within normal calcium, phosphorus, albumin, PTH and vitamin D     # CVS  - No on statins           #Diabetes Hemoglobin A1c 6.6. Off medications           #Others  - No NSAIDs, no contrast as possible. If to be done- we recommend holding ACEi/ARBS/diuretics 24 hrs prior to contrast exposure and ensure appropriate hydration   - Ensure well hydration  - Limit salt in diet  - No smoking              Follow-up in 12 months with repeat blood work and urine analysis        Vasquez Trejo MD, MS, XIOMY ANDRADE  Clinical  - [employer] [employer]  Nephrologist - [facility] - [facility]

## 2023-12-03 NOTE — ADDENDUM NOTE
Encounter addended by: Breana Arora MD on: 12/3/2023 3:33 PM   Actions taken: Cosign clinical note with attestation, Clinical Note Signed, Level of Service modified

## 2023-12-19 ENCOUNTER — INFUSION (OUTPATIENT)
Dept: HEMATOLOGY/ONCOLOGY | Facility: CLINIC | Age: 63
End: 2023-12-19
Payer: COMMERCIAL

## 2023-12-19 ENCOUNTER — OFFICE VISIT (OUTPATIENT)
Dept: HEMATOLOGY/ONCOLOGY | Facility: CLINIC | Age: 63
End: 2023-12-19
Payer: COMMERCIAL

## 2023-12-19 VITALS
OXYGEN SATURATION: 97 % | TEMPERATURE: 97.7 F | DIASTOLIC BLOOD PRESSURE: 98 MMHG | WEIGHT: 264.66 LBS | BODY MASS INDEX: 37.98 KG/M2 | RESPIRATION RATE: 18 BRPM | SYSTOLIC BLOOD PRESSURE: 157 MMHG | HEART RATE: 83 BPM

## 2023-12-19 DIAGNOSIS — C64.1 RENAL CELL CARCINOMA OF RIGHT KIDNEY (MULTI): ICD-10-CM

## 2023-12-19 LAB
ALBUMIN SERPL BCP-MCNC: 4.3 G/DL (ref 3.4–5)
ALP SERPL-CCNC: 63 U/L (ref 33–136)
ALT SERPL W P-5'-P-CCNC: 17 U/L (ref 10–52)
ANION GAP SERPL CALC-SCNC: 14 MMOL/L (ref 10–20)
AST SERPL W P-5'-P-CCNC: 18 U/L (ref 9–39)
BASOPHILS # BLD AUTO: 0.06 X10*3/UL (ref 0–0.1)
BASOPHILS NFR BLD AUTO: 0.7 %
BILIRUB SERPL-MCNC: 0.7 MG/DL (ref 0–1.2)
BUN SERPL-MCNC: 19 MG/DL (ref 6–23)
CALCIUM SERPL-MCNC: 9.7 MG/DL (ref 8.6–10.3)
CHLORIDE SERPL-SCNC: 101 MMOL/L (ref 98–107)
CO2 SERPL-SCNC: 29 MMOL/L (ref 21–32)
CREAT SERPL-MCNC: 1.49 MG/DL (ref 0.5–1.3)
EOSINOPHIL # BLD AUTO: 0.22 X10*3/UL (ref 0–0.7)
EOSINOPHIL NFR BLD AUTO: 2.7 %
ERYTHROCYTE [DISTWIDTH] IN BLOOD BY AUTOMATED COUNT: 12.3 % (ref 11.5–14.5)
GFR SERPL CREATININE-BSD FRML MDRD: 53 ML/MIN/1.73M*2
GLUCOSE SERPL-MCNC: 106 MG/DL (ref 74–99)
HCT VFR BLD AUTO: 46.5 % (ref 41–52)
HGB BLD-MCNC: 15.6 G/DL (ref 13.5–17.5)
IMM GRANULOCYTES # BLD AUTO: 0.03 X10*3/UL (ref 0–0.7)
IMM GRANULOCYTES NFR BLD AUTO: 0.4 % (ref 0–0.9)
LYMPHOCYTES # BLD AUTO: 1.1 X10*3/UL (ref 1.2–4.8)
LYMPHOCYTES NFR BLD AUTO: 13.6 %
MCH RBC QN AUTO: 30 PG (ref 26–34)
MCHC RBC AUTO-ENTMCNC: 33.5 G/DL (ref 32–36)
MCV RBC AUTO: 89 FL (ref 80–100)
MONOCYTES # BLD AUTO: 0.61 X10*3/UL (ref 0.1–1)
MONOCYTES NFR BLD AUTO: 7.6 %
NEUTROPHILS # BLD AUTO: 6.05 X10*3/UL (ref 1.2–7.7)
NEUTROPHILS NFR BLD AUTO: 75 %
PLATELET # BLD AUTO: 207 X10*3/UL (ref 150–450)
POTASSIUM SERPL-SCNC: 3.9 MMOL/L (ref 3.5–5.3)
PROT SERPL-MCNC: 7.2 G/DL (ref 6.4–8.2)
RBC # BLD AUTO: 5.2 X10*6/UL (ref 4.5–5.9)
SODIUM SERPL-SCNC: 140 MMOL/L (ref 136–145)
T4 FREE SERPL-MCNC: 1.05 NG/DL (ref 0.61–1.12)
TSH SERPL-ACNC: 0.33 MIU/L (ref 0.44–3.98)
WBC # BLD AUTO: 8.1 X10*3/UL (ref 4.4–11.3)

## 2023-12-19 PROCEDURE — 1036F TOBACCO NON-USER: CPT | Performed by: PHYSICIAN ASSISTANT

## 2023-12-19 PROCEDURE — 84439 ASSAY OF FREE THYROXINE: CPT

## 2023-12-19 PROCEDURE — 85025 COMPLETE CBC W/AUTO DIFF WBC: CPT

## 2023-12-19 PROCEDURE — 82024 ASSAY OF ACTH: CPT

## 2023-12-19 PROCEDURE — 3080F DIAST BP >= 90 MM HG: CPT | Performed by: PHYSICIAN ASSISTANT

## 2023-12-19 PROCEDURE — 2500000004 HC RX 250 GENERAL PHARMACY W/ HCPCS (ALT 636 FOR OP/ED): Performed by: INTERNAL MEDICINE

## 2023-12-19 PROCEDURE — 2500000004 HC RX 250 GENERAL PHARMACY W/ HCPCS (ALT 636 FOR OP/ED): Mod: JZ | Performed by: INTERNAL MEDICINE

## 2023-12-19 PROCEDURE — 3066F NEPHROPATHY DOC TX: CPT | Performed by: PHYSICIAN ASSISTANT

## 2023-12-19 PROCEDURE — 4010F ACE/ARB THERAPY RXD/TAKEN: CPT | Performed by: PHYSICIAN ASSISTANT

## 2023-12-19 PROCEDURE — 99214 OFFICE O/P EST MOD 30 MIN: CPT | Performed by: PHYSICIAN ASSISTANT

## 2023-12-19 PROCEDURE — 82533 TOTAL CORTISOL: CPT

## 2023-12-19 PROCEDURE — 96375 TX/PRO/DX INJ NEW DRUG ADDON: CPT | Mod: INF

## 2023-12-19 PROCEDURE — 96413 CHEMO IV INFUSION 1 HR: CPT

## 2023-12-19 PROCEDURE — 2500000001 HC RX 250 WO HCPCS SELF ADMINISTERED DRUGS (ALT 637 FOR MEDICARE OP): Performed by: INTERNAL MEDICINE

## 2023-12-19 PROCEDURE — 84075 ASSAY ALKALINE PHOSPHATASE: CPT

## 2023-12-19 PROCEDURE — 80053 COMPREHEN METABOLIC PANEL: CPT

## 2023-12-19 PROCEDURE — 36415 COLL VENOUS BLD VENIPUNCTURE: CPT

## 2023-12-19 PROCEDURE — 84443 ASSAY THYROID STIM HORMONE: CPT

## 2023-12-19 PROCEDURE — 3077F SYST BP >= 140 MM HG: CPT | Performed by: PHYSICIAN ASSISTANT

## 2023-12-19 RX ORDER — PROCHLORPERAZINE MALEATE 10 MG
10 TABLET ORAL EVERY 6 HOURS PRN
Status: DISCONTINUED | OUTPATIENT
Start: 2023-12-19 | End: 2023-12-19 | Stop reason: HOSPADM

## 2023-12-19 RX ORDER — EPINEPHRINE 0.3 MG/.3ML
0.3 INJECTION SUBCUTANEOUS EVERY 5 MIN PRN
Status: DISCONTINUED | OUTPATIENT
Start: 2023-12-19 | End: 2023-12-19 | Stop reason: HOSPADM

## 2023-12-19 RX ORDER — LORAZEPAM 0.5 MG/1
0.5 TABLET ORAL ONCE
Status: COMPLETED | OUTPATIENT
Start: 2023-12-19 | End: 2023-12-19

## 2023-12-19 RX ORDER — FAMOTIDINE 10 MG/ML
20 INJECTION INTRAVENOUS ONCE AS NEEDED
Status: DISCONTINUED | OUTPATIENT
Start: 2023-12-19 | End: 2023-12-19 | Stop reason: HOSPADM

## 2023-12-19 RX ORDER — DIPHENHYDRAMINE HYDROCHLORIDE 50 MG/ML
50 INJECTION INTRAMUSCULAR; INTRAVENOUS AS NEEDED
Status: DISCONTINUED | OUTPATIENT
Start: 2023-12-19 | End: 2023-12-19 | Stop reason: HOSPADM

## 2023-12-19 RX ORDER — ALBUTEROL SULFATE 0.83 MG/ML
3 SOLUTION RESPIRATORY (INHALATION) AS NEEDED
Status: DISCONTINUED | OUTPATIENT
Start: 2023-12-19 | End: 2023-12-19 | Stop reason: HOSPADM

## 2023-12-19 RX ORDER — DEXAMETHASONE SODIUM PHOSPHATE 4 MG/ML
8 INJECTION, SOLUTION INTRA-ARTICULAR; INTRALESIONAL; INTRAMUSCULAR; INTRAVENOUS; SOFT TISSUE ONCE
Status: COMPLETED | OUTPATIENT
Start: 2023-12-19 | End: 2023-12-19

## 2023-12-19 RX ORDER — PROCHLORPERAZINE EDISYLATE 5 MG/ML
10 INJECTION INTRAMUSCULAR; INTRAVENOUS EVERY 6 HOURS PRN
Status: DISCONTINUED | OUTPATIENT
Start: 2023-12-19 | End: 2023-12-19 | Stop reason: HOSPADM

## 2023-12-19 RX ADMIN — LORAZEPAM 0.5 MG: 0.5 TABLET ORAL at 14:47

## 2023-12-19 RX ADMIN — DEXAMETHASONE SODIUM PHOSPHATE 8 MG: 4 INJECTION INTRA-ARTICULAR; INTRALESIONAL; INTRAMUSCULAR; INTRAVENOUS; SOFT TISSUE at 14:50

## 2023-12-19 RX ADMIN — SODIUM CHLORIDE 480 MG: 9 INJECTION, SOLUTION INTRAVENOUS at 15:12

## 2023-12-19 ASSESSMENT — ENCOUNTER SYMPTOMS
LOSS OF SENSATION IN FEET: 0
OCCASIONAL FEELINGS OF UNSTEADINESS: 0
DEPRESSION: 0

## 2023-12-19 ASSESSMENT — PAIN SCALES - GENERAL: PAINLEVEL: 0-NO PAIN

## 2023-12-19 ASSESSMENT — COLUMBIA-SUICIDE SEVERITY RATING SCALE - C-SSRS
2. HAVE YOU ACTUALLY HAD ANY THOUGHTS OF KILLING YOURSELF?: NO
1. IN THE PAST MONTH, HAVE YOU WISHED YOU WERE DEAD OR WISHED YOU COULD GO TO SLEEP AND NOT WAKE UP?: NO
6. HAVE YOU EVER DONE ANYTHING, STARTED TO DO ANYTHING, OR PREPARED TO DO ANYTHING TO END YOUR LIFE?: NO

## 2023-12-19 ASSESSMENT — PATIENT HEALTH QUESTIONNAIRE - PHQ9
SUM OF ALL RESPONSES TO PHQ9 QUESTIONS 1 AND 2: 0
2. FEELING DOWN, DEPRESSED OR HOPELESS: NOT AT ALL
1. LITTLE INTEREST OR PLEASURE IN DOING THINGS: NOT AT ALL

## 2023-12-19 NOTE — SIGNIFICANT EVENT
12/19/23 1435   Prechemo Checklist   Has the patient been in the hospital, ED, or urgent care since last date of service No   Chemo/Immuno Consent Signed Yes   Protocol/Indications Verified Yes   Confirmed to previous date/time of medication Yes   Compared to previous dose Yes   All medications are dated accurately Yes   Pregnancy Test Negative Not applicable   Parameters Met Yes   BSA/Weight-Height Verified Yes   Dose Calculations Verified Yes  (flat dose)

## 2023-12-19 NOTE — PROGRESS NOTES
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  #2) DM, HTN      History of Present Illness:   Completed SBRT to 50Gy in 5fx to the soft tissue recurrence (5.6cm in max diameter at that time), with COT on 05/19/2023.     Patient presents for follow up and treatment. On monthly Opdivo, tolerating well. Recently saw rad onc and had de la rosa done, awaiting appointment. No new complaints.     Review of Systems:    Constitutional:  No fever, chills, anorexia, or weight loss  Eyes:  No blurry vision, diplopia, or vision loss  ENT:  No nasal congestion, earache, or sore throat  Respiratory:  No cough, SOB, hemoptysis or wheezing  Cardiac:  No chest pain, palpitations, dyspnea on exertion, or orthopnea  Gastrointestinal:  No abdominal pain, nausea, vomiting, diarrhea, melena, hemoptysis, or hematochezia  Genitourinary:  No dysuria, hematuria, frequency, urgency, or flank pain  Musculoskeletal:  No arthralgia, myalgia, or weakness  Neurological:  No dizziness, light-headedness, headache, or syncope  Psychiatric:  No history of anxiety, depression, hallucinations  Skin:  No rash or pruritis  Endocrine:  No heat or cold intolerance, polyuria, or polydipsia  Hematologic:  No bruising     Family history is negative for first-degree relatives with cancer including breast, ovarian, colon or hematologic malignancies.     Allergies and Intolerances:       Allergies:   No Known Allergies: Active    Current Outpatient Medications on File Prior to Visit   Medication Sig Dispense Refill    amLODIPine (Norvasc) 10 mg tablet Take 1 tablet (10 mg) by mouth once daily. 90 tablet 3    cyanocobalamin (Vitamin B-12) 1,000 mcg tablet Take 1 tablet (1,000 mcg) by mouth once daily.      escitalopram (Lexapro) 10 mg tablet Take 1 tablet (10 mg) by mouth once daily.      lisinopril 10 mg  tablet Take 1 tablet (10 mg) by mouth once daily.      LORazepam (Ativan) 1 mg tablet Take 1 tablet (1 mg) by mouth once daily as needed.      losartan (Cozaar) 50 mg tablet Take 1 tablet (50 mg) by mouth once daily.      multivitamin (Daily Vitamin Formula) tablet Take 1 tablet by mouth once daily.      TIRZEPATIDE SUBQ Inject under the skin 1 (one) time per week.       No current facility-administered medications on file prior to visit.              Medical History:   Acute postoperative pain: ICD-10: G89.18, Status: Active   Anemia: ICD-10: D64.9, Status: Active   GI bleed: ICD-10: K92.2, Status: Active   Liver mass: ICD-10: R16.0, Status: Active   Renal lesion: ICD-10: N28.9, Status: Active   History of renal cell carcinoma: ICD-10: Z85.528, Status: Active   DM II (diabetes mellitus, type II), controlled: ICD-10: E11.9, Status: Active   Sepsis: ICD-10: A41.9, Status: Active   Hypotensive episode: ICD-10: I95.9, Status: Active   Colitis, acute: ICD-10: K52.9, Status: Active   Colitis: ICD-10: K52.9, Status: Active   Diarrhea: ICD-10: R19.7, Status: Active   Dehydration: ICD-10: E86.0, Status: Active   Acute kidney injury: ICD-10: N17.9, Status: Active   SIRS (systemic inflammatory response syndrome): ICD-10: R65.10, Status: Active   Primary kidney carcinoma with unknown cell type: ICD-10: C64.9, Status: Active       Surg History:   Status post biopsy: ICD-10: Z98.890, Status: Active    Family History: No Family History items are recorded in the problem list.     Social History:   Social Substance History:  · Smoking Status never smoker (1)   · Additional History    Social history: . Living with family. Denies smoking/drug abuse/alcohol.   (1)  Visit Vitals  BP (!) 157/98 (BP Location: Left arm, Patient Position: Sitting, BP Cuff Size: Large adult)   Pulse 83   Temp 36.5 °C (97.7 °F) (Temporal)   Resp 18   Wt 120 kg (264 lb 10.6 oz)   SpO2 97%   BMI 37.98 kg/m²   Smoking Status Never   BSA 2.43 m²        Physical Exam:   Constitutional: well appearing, RRR, CTA, abdomen soft NT, ND + BS,  no edema, no adenopathy or masses.  Strength is equal throughout all extremities.     CT abdomen pelvis w IV contrast    Result Date: 11/25/2023  Interpreted By:  Murphy Cannon,  and Arron Gallardo STUDY: CT ABDOMEN PELVIS W IV CONTRAST;  11/21/2023 11:05 am   INDICATION: Signs/Symptoms: s/p radiation for renal cell ca  C78.7: Metastatic renal cell carcinoma to liver. Per EMR: History of right-sided renal cell carcinoma status post right nephrectomy in May 2018 and adjuvant Ipi/Nivo (4 cycles). Patient had disease recurrence seen on CT from October 2021 status post resection of residual disease in the liver and nephrectomy bed in December 2021. Patient currently on maintenance Opdivo with stable appearance of oligometastatic disease in the right upper quadrant on prior exams. Patient is now status post radiation to the surgical bed.   COMPARISON: CT cap 08/22/2023   ACCESSION NUMBER(S): [id number]   ORDERING CLINICIAN: PETER LUNA   TECHNIQUE: CT of the abdomen and pelvis was performed.  Standard contiguous axial images were obtained at 3 mm slice thickness through the abdomen and pelvis. Coronal and sagittal reconstructions at 3 mm slice thickness were performed.   75 ml of contrast Omnipaque 350 were administered intravenously without immediate complication.   FINDINGS: LOWER CHEST: The visualized lung base is unremarkable. The heart is normal in size without pericardial effusion. No pleural effusion is present. Visualized distal esophagus appears normal.   ABDOMEN:   LIVER: There are stable postsurgical changes compatible with partial resection of hepatic segments 6 and 7. No new liver lesion.   BILE DUCTS: The intrahepatic and extrahepatic ducts are not dilated.   GALLBLADDER: Unchanged cholelithiasis without evidence for acute cholecystitis.   PANCREAS: The pancreas appears unremarkable without evidence of ductal  dilatation or masses.   SPLEEN: The spleen is normal in size without focal lesions.   ADRENAL GLANDS: The left adrenal gland is normal. The right adrenal gland is surgically absent.   KIDNEYS AND URETERS: There are postsurgical changes status post right nephrectomy previously noted soft tissue nodules within the nephrectomy bed are stable to slightly decreased in size compared to prior including two 8 mm soft tissue nodules (series 2, images 63-64 of 195). No new nodules within the surgical bed. Left kidney is normal in size and demonstrates multiple hypoattenuating lesions, a few of which are too small to characterize but likely represent simple cysts.   PELVIS:   BLADDER: The urinary bladder appears normal without abnormal wall thickening.   REPRODUCTIVE ORGANS: The prostate is again mildly enlarged measuring 5.6 x 4.4 cm.   BOWEL: The stomach is unremarkable.   The small and large bowel are normal in caliber and demonstrate no wall thickening. Multiple sigmoid diverticula are seen without evidence for acute diverticulitis. The appendix appears normal.   VESSELS: There is no aneurysmal dilatation of the abdominal aorta. The IVC appears normal.   PERITONEUM/RETROPERITONEUM/LYMPH NODES: There is no free or loculated fluid collection, no free intraperitoneal air. The retroperitoneum appears normal.  There is continued interval decrease in size of a soft tissue mesenteric implant within the right lower quadrant now measuring 1.5 x 1.6 cm, previously 1.8 x 1.7 cm (series 2, image 61/195). The previously noted adjacent nodularity within the mesentery appear less conspicuous on this examination. No new peritoneal deposits or abdominopelvic lymphadenopathy.   BONES AND ABDOMINAL WALL: No suspicious osseous lesions are identified. Degenerative discogenic disease is noted in the lower thoracic and lumbar spine. Postsurgical changes within the anterior midline abdominal wall are again noted. Otherwise, the abdominal wall  soft tissues are unremarkable.       Renal cell carcinoma restaging scan as compared to prior CT from 08/22/2023:   1. Postsurgical changes of right nephrectomy with interval improvement of disease burden within the nephrectomy bed and peritoneum as detailed above. No new sites of metastatic disease within the chest, abdomen, or pelvis. 2. Additional chronic findings as described above.   I personally reviewed the images/study and I agree with Dr. Paulina Heller and the findings as stated.   MACRO: None   Signed by: Murphy Cannon 11/25/2023 11:02 AM Dictation workstation:   [id number]    CT abdomen pelvis w IV contrast    Result Date: 11/25/2023  #: [phone], Rad Selected: Y Interpreted By:  Murphy Cannon,  and Arron Gallardo STUDY: CT ABDOMEN PELVIS W IV CONTRAST;  11/21/2023 11:05 am    INDICATION: Signs/Symptoms: s/p radiation for renal cell ca  C78.7: Metastatic renal cell carcinoma to liver. Per EMR: History of right-sided renal cell carcinoma status post right nephrectomy in May 2018 and adjuvant Ipi/Nivo (4 cycles). Patient had disease recurrence seen on CT from October 2021 status post resection of residual disease in the liver and nephrectomy bed in December 2021. Patient currently on maintenance Opdivo with stable appearance of oligometastatic disease in the right upper quadrant on prior exams. Patient is now status post radiation to the surgical bed.    COMPARISON: CT cap 08/22/2023    ACCESSION NUMBER(S): [id number]    ORDERING CLINICIAN: PETER LUNA    TECHNIQUE: CT of the abdomen and pelvis was performed.  Standard contiguous axial images were obtained at 3 mm slice thickness through the abdomen and pelvis. Coronal and sagittal reconstructions at 3 mm slice thickness were performed.    75 ml of contrast Omnipaque 350 were administered intravenously without immediate complication.    FINDINGS: LOWER CHEST: The visualized lung base is unremarkable. The heart is normal in size without  pericardial effusion. No pleural effusion is present. Visualized distal esophagus appears normal.    ABDOMEN:    LIVER: There are stable postsurgical changes compatible with partial resection of hepatic segments 6 and 7. No new liver lesion.    BILE DUCTS: The intrahepatic and extrahepatic ducts are not dilated.    GALLBLADDER: Unchanged cholelithiasis without evidence for acute cholecystitis.    PANCREAS: The pancreas appears unremarkable without evidence of ductal dilatation or masses.    SPLEEN: The spleen is normal in size without focal lesions.    ADRENAL GLANDS: The left adrenal gland is normal. The right adrenal gland is surgically absent.    KIDNEYS AND URETERS: There are postsurgical changes status post right nephrectomy previously noted soft tissue nodules within the nephrectomy bed are stable to slightly decreased in size compared to prior including two 8 mm soft tissue nodules (series 2, images 63-64 of 195). No new nodules within the surgical bed. Left kidney is normal in size and demonstrates multiple hypoattenuating lesions, a few of which are too small to characterize but likely represent simple cysts.    PELVIS:    BLADDER: The urinary bladder appears normal without abnormal wall thickening.    REPRODUCTIVE ORGANS: The prostate is again mildly enlarged measuring 5.6 x 4.4 cm.    BOWEL: The stomach is unremarkable.   The small and large bowel are normal in caliber and demonstrate no wall thickening. Multiple sigmoid diverticula are seen without evidence for acute diverticulitis. The appendix appears normal.    VESSELS: There is no aneurysmal dilatation of the abdominal aorta. The IVC appears normal.    PERITONEUM/RETROPERITONEUM/LYMPH NODES: There is no free or loculated fluid collection, no free intraperitoneal air. The retroperitoneum appears normal.  There is continued interval decrease in size of a soft tissue mesenteric implant within the right lower quadrant now measuring 1.5 x 1.6 cm,  "previously 1.8 x 1.7 cm (series 2, image 61/195). The previously noted adjacent nodularity within the mesentery appear less conspicuous on this examination. No new peritoneal deposits or abdominopelvic lymphadenopathy.    BONES AND ABDOMINAL WALL: No suspicious osseous lesions are identified. Degenerative discogenic disease is noted in the lower thoracic and lumbar spine. Postsurgical changes within the anterior midline abdominal wall are again noted. Otherwise, the abdominal wall soft tissues are unremarkable.    IMPRESSION: Renal cell carcinoma restaging scan as compared to prior CT from 08/22/2023:    1. Postsurgical changes of right nephrectomy with interval improvement of disease burden within the nephrectomy bed and peritoneum as detailed above. No new sites of metastatic disease within the chest, abdomen, or pelvis. 2. Additional chronic findings as described above.    I personally reviewed the images/study and I agree with Dr. Paulina Heller and the findings as stated.    MACRO: None    Signed by: Murphy Cannon 11/25/2023 11:02 AM Dictation workstation:   [id number]       Labs:  Lab Results   Component Value Date    WBC 8.1 12/19/2023    NEUTROABS 6.05 12/19/2023    IGABSOL 0.03 12/19/2023    LYMPHSABS 1.10 (L) 12/19/2023    MONOSABS 0.61 12/19/2023    EOSABS 0.22 12/19/2023    BASOSABS 0.06 12/19/2023    RBC 5.20 12/19/2023    MCV 89 12/19/2023    MCHC 33.5 12/19/2023    HGB 15.6 12/19/2023    HCT 46.5 12/19/2023     12/19/2023     No results found for: \"RETICCTPCT\"   Lab Results   Component Value Date    CREATININE 1.49 (H) 12/19/2023    BUN 19 12/19/2023    EGFR 53 (L) 12/19/2023     12/19/2023    K 3.9 12/19/2023     12/19/2023    CO2 29 12/19/2023      Lab Results   Component Value Date    ALT 17 12/19/2023    AST 18 12/19/2023    ALKPHOS 63 12/19/2023    BILITOT 0.7 12/19/2023      Lab Results   Component Value Date    TSH 0.33 (L) 12/19/2023     Lab Results   Component Value Date "    TSH 0.33 (L) 12/19/2023     Lab Results   Component Value Date    IRON 10 (L) 02/17/2021    TIBC 369 02/17/2021    FERRITIN 29 04/13/2021      Lab Results   Component Value Date    OVPTPYGR49 234 02/17/2021      Lab Results   Component Value Date    FOLATE 15.1 02/17/2021     Lab Results   Component Value Date     01/18/2022     Lab Results   Component Value Date    HAPTOGLOBIN 146 02/17/2021     Assessment:    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 Excellent response to 4 cycles of Ipi/Nivo. Course was complicated pneumonitis that has resolved. As well as occult GI bleed, unknown etiology, resolved.    Now on maintenance Opdivo doing well. CT in 10/2021 showed interval increase in size of a retroperitoneal soft tissue mass at the right nephrectomy bed currently measuring 24 x 29 mm. MRI abd 12/2021 showed enlarging R hepatic lobe and R pericolonic metastases.    S/p surgical resection of residual disease in liver and in nephrectomy bed in 12/2021. Path c/w residual disease retroperitoneal lymph nodes and liver.    Continue Opdivo Monthly.     ativan 1 mg PO prior to office due to vasovagal with IVs,     3/2023:  CT scan noting oligometastatic disease in the right lower quadrant site of nephrectomy. Seen by rad onc and awaiting treatment.    5/9/2023:  Plan patient to continue Opdivo q. 28 days. Noted to have mild hypercalcemia. Plan for hydration and lab check next week. Will also obtain bone scan.    06/06/2023 patient showing oligometastatic disease.  Right lower quadrant site of nephrectomy.  Patient is status post radiation to the surgical bed with very good effect.  He is to continue Opdivo q. 28 days scheduled today.  Cleared for therapy.    09/26/2023 patient presents today the results of his radiographic imaging indicating a response to treatment reduction in size no new lesions.  This has been explained to patient.  He will continue Opdivo q. 28 daily.  He has no  complaints.    12/19/2023: Continue Opdivo every 4 weeks. Continue surveillance imaging due in 2/202. F/U w/rad onc.    RTC in 4 weeks    Patient verbalized understanding, and all his questions were answered to her satisfaction    30 min spent with patient greater than 50 % of which was spent in consultation and coordination of care.

## 2023-12-20 LAB — CORTIS AM PEAK SERPL-MSCNC: 11.8 UG/DL (ref 5–20)

## 2023-12-21 LAB — ACTH PLAS-MCNC: 93.7 PG/ML (ref 7.2–63.3)

## 2024-01-16 ENCOUNTER — OFFICE VISIT (OUTPATIENT)
Dept: HEMATOLOGY/ONCOLOGY | Facility: CLINIC | Age: 64
End: 2024-01-16
Payer: COMMERCIAL

## 2024-01-16 ENCOUNTER — INFUSION (OUTPATIENT)
Dept: HEMATOLOGY/ONCOLOGY | Facility: CLINIC | Age: 64
End: 2024-01-16
Payer: COMMERCIAL

## 2024-01-16 VITALS
BODY MASS INDEX: 37.96 KG/M2 | DIASTOLIC BLOOD PRESSURE: 93 MMHG | TEMPERATURE: 97.9 F | WEIGHT: 264.55 LBS | RESPIRATION RATE: 16 BRPM | SYSTOLIC BLOOD PRESSURE: 157 MMHG | OXYGEN SATURATION: 96 % | HEART RATE: 92 BPM

## 2024-01-16 DIAGNOSIS — C64.1 RENAL CELL CARCINOMA OF RIGHT KIDNEY (MULTI): ICD-10-CM

## 2024-01-16 LAB
ALBUMIN SERPL BCP-MCNC: 4.4 G/DL (ref 3.4–5)
ALP SERPL-CCNC: 62 U/L (ref 33–136)
ALT SERPL W P-5'-P-CCNC: 17 U/L (ref 10–52)
ANION GAP SERPL CALC-SCNC: 12 MMOL/L (ref 10–20)
AST SERPL W P-5'-P-CCNC: 19 U/L (ref 9–39)
BASOPHILS # BLD AUTO: 0.05 X10*3/UL (ref 0–0.1)
BASOPHILS NFR BLD AUTO: 0.9 %
BILIRUB SERPL-MCNC: 0.5 MG/DL (ref 0–1.2)
BUN SERPL-MCNC: 25 MG/DL (ref 6–23)
CALCIUM SERPL-MCNC: 9.7 MG/DL (ref 8.6–10.3)
CHLORIDE SERPL-SCNC: 104 MMOL/L (ref 98–107)
CO2 SERPL-SCNC: 26 MMOL/L (ref 21–32)
CREAT SERPL-MCNC: 1.43 MG/DL (ref 0.5–1.3)
EGFRCR SERPLBLD CKD-EPI 2021: 55 ML/MIN/1.73M*2
EOSINOPHIL # BLD AUTO: 0.23 X10*3/UL (ref 0–0.7)
EOSINOPHIL NFR BLD AUTO: 4.1 %
ERYTHROCYTE [DISTWIDTH] IN BLOOD BY AUTOMATED COUNT: 12.8 % (ref 11.5–14.5)
GLUCOSE SERPL-MCNC: 140 MG/DL (ref 74–99)
HCT VFR BLD AUTO: 47.7 % (ref 41–52)
HGB BLD-MCNC: 15.9 G/DL (ref 13.5–17.5)
IMM GRANULOCYTES # BLD AUTO: 0.03 X10*3/UL (ref 0–0.7)
IMM GRANULOCYTES NFR BLD AUTO: 0.5 % (ref 0–0.9)
LYMPHOCYTES # BLD AUTO: 1.19 X10*3/UL (ref 1.2–4.8)
LYMPHOCYTES NFR BLD AUTO: 21 %
MCH RBC QN AUTO: 30.1 PG (ref 26–34)
MCHC RBC AUTO-ENTMCNC: 33.3 G/DL (ref 32–36)
MCV RBC AUTO: 90 FL (ref 80–100)
MONOCYTES # BLD AUTO: 0.54 X10*3/UL (ref 0.1–1)
MONOCYTES NFR BLD AUTO: 9.5 %
NEUTROPHILS # BLD AUTO: 3.62 X10*3/UL (ref 1.2–7.7)
NEUTROPHILS NFR BLD AUTO: 64 %
PLATELET # BLD AUTO: 203 X10*3/UL (ref 150–450)
POTASSIUM SERPL-SCNC: 4.2 MMOL/L (ref 3.5–5.3)
PROT SERPL-MCNC: 7.7 G/DL (ref 6.4–8.2)
RBC # BLD AUTO: 5.29 X10*6/UL (ref 4.5–5.9)
SODIUM SERPL-SCNC: 138 MMOL/L (ref 136–145)
TSH SERPL-ACNC: 0.51 MIU/L (ref 0.44–3.98)
WBC # BLD AUTO: 5.7 X10*3/UL (ref 4.4–11.3)

## 2024-01-16 PROCEDURE — 3066F NEPHROPATHY DOC TX: CPT | Performed by: INTERNAL MEDICINE

## 2024-01-16 PROCEDURE — 85025 COMPLETE CBC W/AUTO DIFF WBC: CPT

## 2024-01-16 PROCEDURE — 2500000004 HC RX 250 GENERAL PHARMACY W/ HCPCS (ALT 636 FOR OP/ED): Mod: JZ | Performed by: INTERNAL MEDICINE

## 2024-01-16 PROCEDURE — 82565 ASSAY OF CREATININE: CPT

## 2024-01-16 PROCEDURE — 82024 ASSAY OF ACTH: CPT

## 2024-01-16 PROCEDURE — 2500000004 HC RX 250 GENERAL PHARMACY W/ HCPCS (ALT 636 FOR OP/ED): Performed by: INTERNAL MEDICINE

## 2024-01-16 PROCEDURE — 2500000001 HC RX 250 WO HCPCS SELF ADMINISTERED DRUGS (ALT 637 FOR MEDICARE OP): Performed by: INTERNAL MEDICINE

## 2024-01-16 PROCEDURE — 82533 TOTAL CORTISOL: CPT | Mod: GEALAB

## 2024-01-16 PROCEDURE — 4010F ACE/ARB THERAPY RXD/TAKEN: CPT | Performed by: INTERNAL MEDICINE

## 2024-01-16 PROCEDURE — 96413 CHEMO IV INFUSION 1 HR: CPT

## 2024-01-16 PROCEDURE — 1036F TOBACCO NON-USER: CPT | Performed by: INTERNAL MEDICINE

## 2024-01-16 PROCEDURE — 99214 OFFICE O/P EST MOD 30 MIN: CPT | Performed by: INTERNAL MEDICINE

## 2024-01-16 PROCEDURE — 3077F SYST BP >= 140 MM HG: CPT | Performed by: INTERNAL MEDICINE

## 2024-01-16 PROCEDURE — 3080F DIAST BP >= 90 MM HG: CPT | Performed by: INTERNAL MEDICINE

## 2024-01-16 PROCEDURE — 96375 TX/PRO/DX INJ NEW DRUG ADDON: CPT | Mod: INF

## 2024-01-16 PROCEDURE — 84443 ASSAY THYROID STIM HORMONE: CPT

## 2024-01-16 RX ORDER — DIPHENHYDRAMINE HYDROCHLORIDE 50 MG/ML
50 INJECTION INTRAMUSCULAR; INTRAVENOUS AS NEEDED
Status: DISCONTINUED | OUTPATIENT
Start: 2024-01-16 | End: 2024-01-16 | Stop reason: HOSPADM

## 2024-01-16 RX ORDER — LORAZEPAM 0.5 MG/1
0.5 TABLET ORAL ONCE
Status: COMPLETED | OUTPATIENT
Start: 2024-01-16 | End: 2024-01-16

## 2024-01-16 RX ORDER — EPINEPHRINE 0.3 MG/.3ML
0.3 INJECTION SUBCUTANEOUS EVERY 5 MIN PRN
Status: DISCONTINUED | OUTPATIENT
Start: 2024-01-16 | End: 2024-01-16 | Stop reason: HOSPADM

## 2024-01-16 RX ORDER — PROCHLORPERAZINE EDISYLATE 5 MG/ML
10 INJECTION INTRAMUSCULAR; INTRAVENOUS EVERY 6 HOURS PRN
Status: DISCONTINUED | OUTPATIENT
Start: 2024-01-16 | End: 2024-01-16 | Stop reason: HOSPADM

## 2024-01-16 RX ORDER — FAMOTIDINE 10 MG/ML
20 INJECTION INTRAVENOUS ONCE AS NEEDED
Status: DISCONTINUED | OUTPATIENT
Start: 2024-01-16 | End: 2024-01-16 | Stop reason: HOSPADM

## 2024-01-16 RX ORDER — DEXAMETHASONE SODIUM PHOSPHATE 4 MG/ML
8 INJECTION, SOLUTION INTRA-ARTICULAR; INTRALESIONAL; INTRAMUSCULAR; INTRAVENOUS; SOFT TISSUE ONCE
Status: COMPLETED | OUTPATIENT
Start: 2024-01-16 | End: 2024-01-16

## 2024-01-16 RX ORDER — ALBUTEROL SULFATE 0.83 MG/ML
3 SOLUTION RESPIRATORY (INHALATION) AS NEEDED
Status: DISCONTINUED | OUTPATIENT
Start: 2024-01-16 | End: 2024-01-16 | Stop reason: HOSPADM

## 2024-01-16 RX ORDER — PROCHLORPERAZINE MALEATE 10 MG
10 TABLET ORAL EVERY 6 HOURS PRN
Status: DISCONTINUED | OUTPATIENT
Start: 2024-01-16 | End: 2024-01-16 | Stop reason: HOSPADM

## 2024-01-16 RX ADMIN — DEXAMETHASONE SODIUM PHOSPHATE 8 MG: 4 INJECTION INTRA-ARTICULAR; INTRALESIONAL; INTRAMUSCULAR; INTRAVENOUS; SOFT TISSUE at 13:02

## 2024-01-16 RX ADMIN — LORAZEPAM 0.5 MG: 0.5 TABLET ORAL at 12:40

## 2024-01-16 RX ADMIN — SODIUM CHLORIDE 480 MG: 9 INJECTION, SOLUTION INTRAVENOUS at 13:35

## 2024-01-16 ASSESSMENT — PAIN SCALES - GENERAL: PAINLEVEL: 0-NO PAIN

## 2024-01-16 NOTE — PROGRESS NOTES
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  #2) DM, HTN      History of Present Illness:   Completed SBRT to 50Gy in 5fx to the soft tissue recurrence (5.6cm in max diameter at that time), with COT on 05/19/2023.     Patient presents for follow up and treatment. On monthly Opdivo, tolerating well. Recently saw rad onc and had de la rosa done, awaiting appointment. No new complaints.     Review of Systems:    Constitutional:  No fever, chills, anorexia, or weight loss  Eyes:  No blurry vision, diplopia, or vision loss  ENT:  No nasal congestion, earache, or sore throat  Respiratory:  No cough, SOB, hemoptysis or wheezing  Cardiac:  No chest pain, palpitations, dyspnea on exertion, or orthopnea  Gastrointestinal:  No abdominal pain, nausea, vomiting, diarrhea, melena, hemoptysis, or hematochezia  Genitourinary:  No dysuria, hematuria, frequency, urgency, or flank pain  Musculoskeletal:  No arthralgia, myalgia, or weakness  Neurological:  No dizziness, light-headedness, headache, or syncope  Psychiatric:  No history of anxiety, depression, hallucinations  Skin:  No rash or pruritis  Endocrine:  No heat or cold intolerance, polyuria, or polydipsia  Hematologic:  No bruising     Family history is negative for first-degree relatives with cancer including breast, ovarian, colon or hematologic malignancies.     Allergies and Intolerances:       Allergies:   No Known Allergies: Active    Current Outpatient Medications on File Prior to Visit   Medication Sig Dispense Refill    amLODIPine (Norvasc) 10 mg tablet Take 1 tablet (10 mg) by mouth once daily. 90 tablet 3    cyanocobalamin (Vitamin B-12) 1,000 mcg tablet Take 1 tablet (1,000 mcg) by mouth once daily.      escitalopram (Lexapro) 10 mg tablet Take 1 tablet (10 mg) by mouth once daily.      lisinopril 10 mg  tablet Take 1 tablet (10 mg) by mouth once daily.      LORazepam (Ativan) 1 mg tablet Take 1 tablet (1 mg) by mouth once daily as needed.      losartan (Cozaar) 50 mg tablet Take 1 tablet (50 mg) by mouth once daily.      multivitamin (Daily Vitamin Formula) tablet Take 1 tablet by mouth once daily.      [DISCONTINUED] TIRZEPATIDE SUBQ Inject under the skin 1 (one) time per week.       No current facility-administered medications on file prior to visit.              Medical History:   Acute postoperative pain: ICD-10: G89.18, Status: Active   Anemia: ICD-10: D64.9, Status: Active   GI bleed: ICD-10: K92.2, Status: Active   Liver mass: ICD-10: R16.0, Status: Active   Renal lesion: ICD-10: N28.9, Status: Active   History of renal cell carcinoma: ICD-10: Z85.528, Status: Active   DM II (diabetes mellitus, type II), controlled: ICD-10: E11.9, Status: Active   Sepsis: ICD-10: A41.9, Status: Active   Hypotensive episode: ICD-10: I95.9, Status: Active   Colitis, acute: ICD-10: K52.9, Status: Active   Colitis: ICD-10: K52.9, Status: Active   Diarrhea: ICD-10: R19.7, Status: Active   Dehydration: ICD-10: E86.0, Status: Active   Acute kidney injury: ICD-10: N17.9, Status: Active   SIRS (systemic inflammatory response syndrome): ICD-10: R65.10, Status: Active   Primary kidney carcinoma with unknown cell type: ICD-10: C64.9, Status: Active       Surg History:   Status post biopsy: ICD-10: Z98.890, Status: Active    Family History: No Family History items are recorded in the problem list.     Social History:   Social Substance History:  · Smoking Status never smoker (1)   · Additional History    Social history: . Living with family. Denies smoking/drug abuse/alcohol.   (1)  Visit Vitals  BP (!) 157/93 (BP Location: Left arm)   Pulse 92   Temp 36.6 °C (97.9 °F)   Resp 16   Wt 120 kg (264 lb 8.8 oz)   SpO2 96%   BMI 37.96 kg/m²   Smoking Status Never   BSA 2.43 m²       Physical Exam:   Constitutional: well appearing, RRR,  CTA, abdomen soft NT, ND + BS,  no edema, no adenopathy or masses.  Strength is equal throughout all extremities.     CT abdomen pelvis w IV contrast    Result Date: 11/25/2023  Interpreted By:  Murphy Cannon,  and Arron Gallardo STUDY: CT ABDOMEN PELVIS W IV CONTRAST;  11/21/2023 11:05 am   INDICATION: Signs/Symptoms: s/p radiation for renal cell ca  C78.7: Metastatic renal cell carcinoma to liver. Per EMR: History of right-sided renal cell carcinoma status post right nephrectomy in May 2018 and adjuvant Ipi/Nivo (4 cycles). Patient had disease recurrence seen on CT from October 2021 status post resection of residual disease in the liver and nephrectomy bed in December 2021. Patient currently on maintenance Opdivo with stable appearance of oligometastatic disease in the right upper quadrant on prior exams. Patient is now status post radiation to the surgical bed.   COMPARISON: CT cap 08/22/2023   ACCESSION NUMBER(S): [id number]   ORDERING CLINICIAN: PETER LUNA   TECHNIQUE: CT of the abdomen and pelvis was performed.  Standard contiguous axial images were obtained at 3 mm slice thickness through the abdomen and pelvis. Coronal and sagittal reconstructions at 3 mm slice thickness were performed.   75 ml of contrast Omnipaque 350 were administered intravenously without immediate complication.   FINDINGS: LOWER CHEST: The visualized lung base is unremarkable. The heart is normal in size without pericardial effusion. No pleural effusion is present. Visualized distal esophagus appears normal.   ABDOMEN:   LIVER: There are stable postsurgical changes compatible with partial resection of hepatic segments 6 and 7. No new liver lesion.   BILE DUCTS: The intrahepatic and extrahepatic ducts are not dilated.   GALLBLADDER: Unchanged cholelithiasis without evidence for acute cholecystitis.   PANCREAS: The pancreas appears unremarkable without evidence of ductal dilatation or masses.   SPLEEN: The spleen is normal in size  without focal lesions.   ADRENAL GLANDS: The left adrenal gland is normal. The right adrenal gland is surgically absent.   KIDNEYS AND URETERS: There are postsurgical changes status post right nephrectomy previously noted soft tissue nodules within the nephrectomy bed are stable to slightly decreased in size compared to prior including two 8 mm soft tissue nodules (series 2, images 63-64 of 195). No new nodules within the surgical bed. Left kidney is normal in size and demonstrates multiple hypoattenuating lesions, a few of which are too small to characterize but likely represent simple cysts.   PELVIS:   BLADDER: The urinary bladder appears normal without abnormal wall thickening.   REPRODUCTIVE ORGANS: The prostate is again mildly enlarged measuring 5.6 x 4.4 cm.   BOWEL: The stomach is unremarkable.   The small and large bowel are normal in caliber and demonstrate no wall thickening. Multiple sigmoid diverticula are seen without evidence for acute diverticulitis. The appendix appears normal.   VESSELS: There is no aneurysmal dilatation of the abdominal aorta. The IVC appears normal.   PERITONEUM/RETROPERITONEUM/LYMPH NODES: There is no free or loculated fluid collection, no free intraperitoneal air. The retroperitoneum appears normal.  There is continued interval decrease in size of a soft tissue mesenteric implant within the right lower quadrant now measuring 1.5 x 1.6 cm, previously 1.8 x 1.7 cm (series 2, image 61/195). The previously noted adjacent nodularity within the mesentery appear less conspicuous on this examination. No new peritoneal deposits or abdominopelvic lymphadenopathy.   BONES AND ABDOMINAL WALL: No suspicious osseous lesions are identified. Degenerative discogenic disease is noted in the lower thoracic and lumbar spine. Postsurgical changes within the anterior midline abdominal wall are again noted. Otherwise, the abdominal wall soft tissues are unremarkable.       Renal cell carcinoma  restaging scan as compared to prior CT from 08/22/2023:   1. Postsurgical changes of right nephrectomy with interval improvement of disease burden within the nephrectomy bed and peritoneum as detailed above. No new sites of metastatic disease within the chest, abdomen, or pelvis. 2. Additional chronic findings as described above.   I personally reviewed the images/study and I agree with Dr. Paulina Heller and the findings as stated.   MACRO: None   Signed by: Murphy Cannon 11/25/2023 11:02 AM Dictation workstation:   [id number]    CT abdomen pelvis w IV contrast    Result Date: 11/25/2023  #: [phone], Rad Selected: Y Interpreted By:  Murphy Cannon,  and Arron Gallardo STUDY: CT ABDOMEN PELVIS W IV CONTRAST;  11/21/2023 11:05 am    INDICATION: Signs/Symptoms: s/p radiation for renal cell ca  C78.7: Metastatic renal cell carcinoma to liver. Per EMR: History of right-sided renal cell carcinoma status post right nephrectomy in May 2018 and adjuvant Ipi/Nivo (4 cycles). Patient had disease recurrence seen on CT from October 2021 status post resection of residual disease in the liver and nephrectomy bed in December 2021. Patient currently on maintenance Opdivo with stable appearance of oligometastatic disease in the right upper quadrant on prior exams. Patient is now status post radiation to the surgical bed.    COMPARISON: CT cap 08/22/2023    ACCESSION NUMBER(S): [id number]    ORDERING CLINICIAN: PETER LUNA    TECHNIQUE: CT of the abdomen and pelvis was performed.  Standard contiguous axial images were obtained at 3 mm slice thickness through the abdomen and pelvis. Coronal and sagittal reconstructions at 3 mm slice thickness were performed.    75 ml of contrast Omnipaque 350 were administered intravenously without immediate complication.    FINDINGS: LOWER CHEST: The visualized lung base is unremarkable. The heart is normal in size without pericardial effusion. No pleural effusion is present. Visualized  distal esophagus appears normal.    ABDOMEN:    LIVER: There are stable postsurgical changes compatible with partial resection of hepatic segments 6 and 7. No new liver lesion.    BILE DUCTS: The intrahepatic and extrahepatic ducts are not dilated.    GALLBLADDER: Unchanged cholelithiasis without evidence for acute cholecystitis.    PANCREAS: The pancreas appears unremarkable without evidence of ductal dilatation or masses.    SPLEEN: The spleen is normal in size without focal lesions.    ADRENAL GLANDS: The left adrenal gland is normal. The right adrenal gland is surgically absent.    KIDNEYS AND URETERS: There are postsurgical changes status post right nephrectomy previously noted soft tissue nodules within the nephrectomy bed are stable to slightly decreased in size compared to prior including two 8 mm soft tissue nodules (series 2, images 63-64 of 195). No new nodules within the surgical bed. Left kidney is normal in size and demonstrates multiple hypoattenuating lesions, a few of which are too small to characterize but likely represent simple cysts.    PELVIS:    BLADDER: The urinary bladder appears normal without abnormal wall thickening.    REPRODUCTIVE ORGANS: The prostate is again mildly enlarged measuring 5.6 x 4.4 cm.    BOWEL: The stomach is unremarkable.   The small and large bowel are normal in caliber and demonstrate no wall thickening. Multiple sigmoid diverticula are seen without evidence for acute diverticulitis. The appendix appears normal.    VESSELS: There is no aneurysmal dilatation of the abdominal aorta. The IVC appears normal.    PERITONEUM/RETROPERITONEUM/LYMPH NODES: There is no free or loculated fluid collection, no free intraperitoneal air. The retroperitoneum appears normal.  There is continued interval decrease in size of a soft tissue mesenteric implant within the right lower quadrant now measuring 1.5 x 1.6 cm, previously 1.8 x 1.7 cm (series 2, image 61/195). The previously noted  "adjacent nodularity within the mesentery appear less conspicuous on this examination. No new peritoneal deposits or abdominopelvic lymphadenopathy.    BONES AND ABDOMINAL WALL: No suspicious osseous lesions are identified. Degenerative discogenic disease is noted in the lower thoracic and lumbar spine. Postsurgical changes within the anterior midline abdominal wall are again noted. Otherwise, the abdominal wall soft tissues are unremarkable.    IMPRESSION: Renal cell carcinoma restaging scan as compared to prior CT from 08/22/2023:    1. Postsurgical changes of right nephrectomy with interval improvement of disease burden within the nephrectomy bed and peritoneum as detailed above. No new sites of metastatic disease within the chest, abdomen, or pelvis. 2. Additional chronic findings as described above.    I personally reviewed the images/study and I agree with Dr. Paulina Heller and the findings as stated.    MACRO: None    Signed by: Murphy Cannon 11/25/2023 11:02 AM Dictation workstation:   [id number]       Labs:  Lab Results   Component Value Date    WBC 5.7 01/16/2024    NEUTROABS 3.62 01/16/2024    IGABSOL 0.03 01/16/2024    LYMPHSABS 1.19 (L) 01/16/2024    MONOSABS 0.54 01/16/2024    EOSABS 0.23 01/16/2024    BASOSABS 0.05 01/16/2024    RBC 5.29 01/16/2024    MCV 90 01/16/2024    MCHC 33.3 01/16/2024    HGB 15.9 01/16/2024    HCT 47.7 01/16/2024     01/16/2024     No results found for: \"RETICCTPCT\"   Lab Results   Component Value Date    CREATININE 1.43 (H) 01/16/2024    BUN 25 (H) 01/16/2024    EGFR 55 (L) 01/16/2024     01/16/2024    K 4.2 01/16/2024     01/16/2024    CO2 26 01/16/2024      Lab Results   Component Value Date    ALT 17 01/16/2024    AST 19 01/16/2024    ALKPHOS 62 01/16/2024    BILITOT 0.5 01/16/2024      Lab Results   Component Value Date    TSH 0.51 01/16/2024     Lab Results   Component Value Date    TSH 0.51 01/16/2024     Lab Results   Component Value Date    IRON " 10 (L) 02/17/2021    TIBC 369 02/17/2021    FERRITIN 29 04/13/2021      Lab Results   Component Value Date    LXWZNMHN55 234 02/17/2021      Lab Results   Component Value Date    FOLATE 15.1 02/17/2021     Lab Results   Component Value Date     01/18/2022     Lab Results   Component Value Date    HAPTOGLOBIN 146 02/17/2021     Assessment:    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 Excellent response to 4 cycles of Ipi/Nivo. Course was complicated pneumonitis that has resolved. As well as occult GI bleed, unknown etiology, resolved.    Now on maintenance Opdivo doing well. CT in 10/2021 showed interval increase in size of a retroperitoneal soft tissue mass at the right nephrectomy bed currently measuring 24 x 29 mm. MRI abd 12/2021 showed enlarging R hepatic lobe and R pericolonic metastases.    S/p surgical resection of residual disease in liver and in nephrectomy bed in 12/2021. Path c/w residual disease retroperitoneal lymph nodes and liver.    Continue Opdivo Monthly.     ativan 1 mg PO prior to office due to vasovagal with IVs,     3/2023:  CT scan noting oligometastatic disease in the right lower quadrant site of nephrectomy. Seen by rad onc and awaiting treatment.    5/9/2023:  Plan patient to continue Opdivo q. 28 days. Noted to have mild hypercalcemia. Plan for hydration and lab check next week. Will also obtain bone scan.    06/06/2023 patient showing oligometastatic disease.  Right lower quadrant site of nephrectomy.  Patient is status post radiation to the surgical bed with very good effect.  He is to continue Opdivo q. 28 days scheduled today.  Cleared for therapy.    09/26/2023 patient presents today the results of his radiographic imaging indicating a response to treatment reduction in size no new lesions.  This has been explained to patient.  He will continue Opdivo q. 28 daily.  He has no complaints.    12/19/2023: Continue Opdivo every 4 weeks. Continue surveillance imaging  due in 2/202. F/U w/rad onc.    RTC in 4 weeks    Patient verbalized understanding, and all his questions were answered to her satisfaction    30 min spent with patient greater than 50 % of which was spent in consultation and coordination of care.Patient ID: Jason Burns is a 63 y.o. male.  Referring Physician: Kelvin Retana MD  [id number] [place] [phone]  Primary Care Provider: Karen Colin DO  Visit Type:  Follow Up         Subjective    HPI    Review of Systems - Oncology     Objective   BSA: There is no height or weight on file to calculate BSA.  There were no vitals taken for this visit.     has a past medical history of Campylobacter enteritis (11/02/2018), Personal history of other diseases of the digestive system (08/11/2016), Plantar fascial fibromatosis (08/19/2015), Renal cell cancer (CMS/HCC), and Unspecified sprain of unspecified great toe, initial encounter (08/19/2015).   has a past surgical history that includes Colonoscopy (10/21/2013); Umbilical hernia repair (04/25/2018); US guided needle liver biopsy (10/12/2020); CT angio abdomen pelvis w and or wo IV IV contrast (2/7/2021); and CT angio abdomen pelvis w and or wo IV IV contrast (2/10/2021).  No family history on file.  Oncology History   Renal cell carcinoma of right kidney (CMS/HCC)   10/22/2023 Initial Diagnosis    Renal cell carcinoma of right kidney (CMS/HCC)     10/24/2023 -  Chemotherapy    Nivolumab 480 mg, 28 Day Cycles         Jason Burns  reports that he has never smoked. He has never used smokeless tobacco.  He  reports current alcohol use.  He  reports that he does not currently use drugs.    Physical Exam    WBC   Date/Time Value Ref Range Status   12/19/2023 01:14 PM 8.1 4.4 - 11.3 x10*3/uL Final   11/21/2023 02:33 PM 5.4 4.4 - 11.3 x10*3/uL Final   10/24/2023 02:34 PM 5.7 4.4 - 11.3 x10*3/uL Final     nRBC   Date Value Ref Range Status   11/21/2023 0.0 0.0 - 0.0 /100  WBCs Final   08/29/2023 CANCELED 0.0 - 0.0 /100 WBC      Comment:     Result canceled by the ancillary.   08/23/2023 CANCELED       Comment:     Result canceled by the ancillary.   05/19/2023 CANCELED       Comment:     Result canceled by the ancillary.     RBC   Date Value Ref Range Status   12/19/2023 5.20 4.50 - 5.90 x10*6/uL Final   11/21/2023 4.99 4.50 - 5.90 x10*6/uL Final   10/24/2023 4.92 4.50 - 5.90 x10*6/uL Final     Hemoglobin   Date Value Ref Range Status   12/19/2023 15.6 13.5 - 17.5 g/dL Final   11/21/2023 15.3 13.5 - 17.5 g/dL Final   10/24/2023 15.0 13.5 - 17.5 g/dL Final     Hematocrit   Date Value Ref Range Status   12/19/2023 46.5 41.0 - 52.0 % Final   11/21/2023 44.7 41.0 - 52.0 % Final   10/24/2023 43.9 41.0 - 52.0 % Final     MCV   Date/Time Value Ref Range Status   12/19/2023 01:14 PM 89 80 - 100 fL Final   11/21/2023 02:33 PM 90 80 - 100 fL Final   10/24/2023 02:34 PM 89 80 - 100 fL Final     MCH   Date/Time Value Ref Range Status   12/19/2023 01:14 PM 30.0 26.0 - 34.0 pg Final   11/21/2023 02:33 PM 30.7 26.0 - 34.0 pg Final   10/24/2023 02:34 PM 30.5 26.0 - 34.0 pg Final     MCHC   Date/Time Value Ref Range Status   12/19/2023 01:14 PM 33.5 32.0 - 36.0 g/dL Final   11/21/2023 02:33 PM 34.2 32.0 - 36.0 g/dL Final   10/24/2023 02:34 PM 34.2 32.0 - 36.0 g/dL Final     RDW   Date/Time Value Ref Range Status   12/19/2023 01:14 PM 12.3 11.5 - 14.5 % Final   11/21/2023 02:33 PM 12.8 11.5 - 14.5 % Final   10/24/2023 02:34 PM 12.7 11.5 - 14.5 % Final     Platelets   Date/Time Value Ref Range Status   12/19/2023 01:14  150 - 450 x10*3/uL Final   11/21/2023 02:33  150 - 450 x10*3/uL Final   10/24/2023 02:34  150 - 450 x10*3/uL Final     MPV   Date/Time Value Ref Range Status   10/24/2023 02:34 PM 9.6 7.5 - 11.5 fL Final     Neutrophils %   Date/Time Value Ref Range Status   12/19/2023 01:14 PM 75.0 40.0 - 80.0 % Final   11/21/2023 02:33 PM 62.6 40.0 - 80.0 % Final   10/24/2023 02:34  PM 61.3 40.0 - 80.0 % Final     Immature Granulocytes %, Automated   Date/Time Value Ref Range Status   12/19/2023 01:14 PM 0.4 0.0 - 0.9 % Final     Comment:     Immature Granulocyte Count (IG) includes promyelocytes, myelocytes and metamyelocytes but does not include bands. Percent differential counts (%) should be interpreted in the context of the absolute cell counts (cells/UL).   11/21/2023 02:33 PM 0.6 0.0 - 0.9 % Final     Comment:     Immature Granulocyte Count (IG) includes promyelocytes, myelocytes and metamyelocytes but does not include bands. Percent differential counts (%) should be interpreted in the context of the absolute cell counts (cells/UL).   10/24/2023 02:34 PM 0.2 0.0 - 0.9 % Final     Comment:     Immature Granulocyte Count (IG) includes promyelocytes, myelocytes and metamyelocytes but does not include bands. Percent differential counts (%) should be interpreted in the context of the absolute cell counts (cells/UL).     Lymphocytes %   Date/Time Value Ref Range Status   12/19/2023 01:14 PM 13.6 13.0 - 44.0 % Final   11/21/2023 02:33 PM 23.6 13.0 - 44.0 % Final   10/24/2023 02:34 PM 22.6 13.0 - 44.0 % Final     Monocytes %   Date/Time Value Ref Range Status   12/19/2023 01:14 PM 7.6 2.0 - 10.0 % Final   11/21/2023 02:33 PM 6.8 2.0 - 10.0 % Final   10/24/2023 02:34 PM 9.8 2.0 - 10.0 % Final     Eosinophils %   Date/Time Value Ref Range Status   12/19/2023 01:14 PM 2.7 0.0 - 6.0 % Final   11/21/2023 02:33 PM 5.3 0.0 - 6.0 % Final   10/24/2023 02:34 PM 5.2 0.0 - 6.0 % Final     Basophils %   Date/Time Value Ref Range Status   12/19/2023 01:14 PM 0.7 0.0 - 2.0 % Final   11/21/2023 02:33 PM 1.1 0.0 - 2.0 % Final   10/24/2023 02:34 PM 0.9 0.0 - 2.0 % Final     Neutrophils Absolute   Date/Time Value Ref Range Status   12/19/2023 01:14 PM 6.05 1.20 - 7.70 x10*3/uL Final     Comment:     Percent differential counts (%) should be interpreted in the context of the absolute cell counts (cells/uL).  "  11/21/2023 02:33 PM 3.40 1.20 - 7.70 x10*3/uL Final     Comment:     Percent differential counts (%) should be interpreted in the context of the absolute cell counts (cells/uL).   10/24/2023 02:34 PM 3.51 1.20 - 7.70 x10*3/uL Final     Comment:     Percent differential counts (%) should be interpreted in the context of the absolute cell counts (cells/uL).     Immature Granulocytes Absolute, Automated   Date/Time Value Ref Range Status   12/19/2023 01:14 PM 0.03 0.00 - 0.70 x10*3/uL Final   11/21/2023 02:33 PM 0.03 0.00 - 0.70 x10*3/uL Final   10/24/2023 02:34 PM 0.01 0.00 - 0.70 x10*3/uL Final     Lymphocytes Absolute   Date/Time Value Ref Range Status   12/19/2023 01:14 PM 1.10 (L) 1.20 - 4.80 x10*3/uL Final   11/21/2023 02:33 PM 1.28 1.20 - 4.80 x10*3/uL Final   10/24/2023 02:34 PM 1.29 1.20 - 4.80 x10*3/uL Final     Monocytes Absolute   Date/Time Value Ref Range Status   12/19/2023 01:14 PM 0.61 0.10 - 1.00 x10*3/uL Final   11/21/2023 02:33 PM 0.37 0.10 - 1.00 x10*3/uL Final   10/24/2023 02:34 PM 0.56 0.10 - 1.00 x10*3/uL Final     Eosinophils Absolute   Date/Time Value Ref Range Status   12/19/2023 01:14 PM 0.22 0.00 - 0.70 x10*3/uL Final   11/21/2023 02:33 PM 0.29 0.00 - 0.70 x10*3/uL Final   10/24/2023 02:34 PM 0.30 0.00 - 0.70 x10*3/uL Final     Basophils Absolute   Date/Time Value Ref Range Status   12/19/2023 01:14 PM 0.06 0.00 - 0.10 x10*3/uL Final   11/21/2023 02:33 PM 0.06 0.00 - 0.10 x10*3/uL Final   10/24/2023 02:34 PM 0.05 0.00 - 0.10 x10*3/uL Final       No components found for: \"PT\"  aPTT   Date/Time Value Ref Range Status   01/03/2022 06:26 PM 30 26 - 39 sec Final     Comment:     Note new reference range as of 11/30/2021 at 10:00am.   12/29/2021 02:13 AM 26 26 - 39 sec Final     Comment:     Note new reference range as of 11/30/2021 at 10:00am.   12/16/2021 10:11 AM 32 26 - 39 sec Final     Comment:     Note new reference range as of 11/30/2021 at 10:00am.       Assessment/Plan      Continue " Opdivo every 4 weeks. Continue surveillance imaging   IMPRESSION:   Renal cell carcinoma restaging scan as compared to prior CT from   08/22/2023:      1. Postsurgical changes of right nephrectomy with interval   improvement of disease burden within the nephrectomy bed and   peritoneum as detailed above. No new sites of metastatic disease   within the chest, abdomen, or pelvis.   2. Additional chronic findings as described above.       I personally reviewed the images/study and I agree with Dr. Paulina Heller and the findings as stated.       MACRO:   None      Signed by: Murphy Cannon 11/25/2023 11:02 AM   Diagnoses and all orders for this visit:  Renal cell carcinoma of right kidney (CMS/HCC)  -     Clinic Appointment Request  -     CT chest abdomen pelvis w IV contrast; Future  -     Clinic Appointment Request Follow up; Future  -     Infusion Appointment Request; Future  -     Infusion Appointment Request; Future  -     Infusion Appointment Request; Future  -     Infusion Appointment Request; Future  -     Infusion Appointment Request; Future  -     Infusion Appointment Request; Future  -     Infusion Appointment Request; Future  -     Clinic Appointment Request; Future           Kelvin Retana MD

## 2024-01-16 NOTE — PATIENT INSTRUCTIONS
Today you met with Dr. Retana.  Your next CT will be in August with an MD to follow.  Continue on the monthly nivolumab.  Call for any concerns.

## 2024-01-16 NOTE — SIGNIFICANT EVENT
01/16/24 1221   Prechemo Checklist   Has the patient been in the hospital, ED, or urgent care since last date of service No   Chemo/Immuno Consent Signed Yes   Protocol/Indications Verified Yes   Confirmed to previous date/time of medication Yes   Compared to previous dose Yes   All medications are dated accurately Yes   Pregnancy Test Negative Not applicable   Parameters Met Yes   BSA/Weight-Height Verified Yes

## 2024-01-17 LAB — CORTIS AM PEAK SERPL-MSCNC: 8.2 UG/DL (ref 5–20)

## 2024-01-18 LAB — ACTH PLAS-MCNC: 64.5 PG/ML (ref 7.2–63.3)

## 2024-02-13 ENCOUNTER — INFUSION (OUTPATIENT)
Dept: HEMATOLOGY/ONCOLOGY | Facility: CLINIC | Age: 64
End: 2024-02-13
Payer: COMMERCIAL

## 2024-02-13 ENCOUNTER — OFFICE VISIT (OUTPATIENT)
Dept: HEMATOLOGY/ONCOLOGY | Facility: CLINIC | Age: 64
End: 2024-02-13
Payer: COMMERCIAL

## 2024-02-13 VITALS
RESPIRATION RATE: 16 BRPM | BODY MASS INDEX: 38.53 KG/M2 | SYSTOLIC BLOOD PRESSURE: 160 MMHG | TEMPERATURE: 98.2 F | DIASTOLIC BLOOD PRESSURE: 91 MMHG | WEIGHT: 268.52 LBS | OXYGEN SATURATION: 97 % | HEART RATE: 65 BPM

## 2024-02-13 DIAGNOSIS — C64.1 RENAL CELL CARCINOMA OF RIGHT KIDNEY (MULTI): ICD-10-CM

## 2024-02-13 LAB
ALBUMIN SERPL BCP-MCNC: 4 G/DL (ref 3.4–5)
ALP SERPL-CCNC: 59 U/L (ref 33–136)
ALT SERPL W P-5'-P-CCNC: 15 U/L (ref 10–52)
ANION GAP SERPL CALC-SCNC: 12 MMOL/L (ref 10–20)
AST SERPL W P-5'-P-CCNC: 16 U/L (ref 9–39)
BASOPHILS # BLD AUTO: 0.03 X10*3/UL (ref 0–0.1)
BASOPHILS NFR BLD AUTO: 0.6 %
BILIRUB SERPL-MCNC: 0.6 MG/DL (ref 0–1.2)
BUN SERPL-MCNC: 19 MG/DL (ref 6–23)
CALCIUM SERPL-MCNC: 9.6 MG/DL (ref 8.6–10.3)
CHLORIDE SERPL-SCNC: 105 MMOL/L (ref 98–107)
CO2 SERPL-SCNC: 28 MMOL/L (ref 21–32)
CREAT SERPL-MCNC: 1.37 MG/DL (ref 0.5–1.3)
EGFRCR SERPLBLD CKD-EPI 2021: 58 ML/MIN/1.73M*2
EOSINOPHIL # BLD AUTO: 0.24 X10*3/UL (ref 0–0.7)
EOSINOPHIL NFR BLD AUTO: 4.7 %
ERYTHROCYTE [DISTWIDTH] IN BLOOD BY AUTOMATED COUNT: 12.9 % (ref 11.5–14.5)
GLUCOSE SERPL-MCNC: 130 MG/DL (ref 74–99)
HCT VFR BLD AUTO: 45.5 % (ref 41–52)
HGB BLD-MCNC: 15.2 G/DL (ref 13.5–17.5)
IMM GRANULOCYTES # BLD AUTO: 0.01 X10*3/UL (ref 0–0.7)
IMM GRANULOCYTES NFR BLD AUTO: 0.2 % (ref 0–0.9)
LYMPHOCYTES # BLD AUTO: 1.25 X10*3/UL (ref 1.2–4.8)
LYMPHOCYTES NFR BLD AUTO: 24.3 %
MCH RBC QN AUTO: 30 PG (ref 26–34)
MCHC RBC AUTO-ENTMCNC: 33.4 G/DL (ref 32–36)
MCV RBC AUTO: 90 FL (ref 80–100)
MONOCYTES # BLD AUTO: 0.4 X10*3/UL (ref 0.1–1)
MONOCYTES NFR BLD AUTO: 7.8 %
NEUTROPHILS # BLD AUTO: 3.22 X10*3/UL (ref 1.2–7.7)
NEUTROPHILS NFR BLD AUTO: 62.4 %
PLATELET # BLD AUTO: 185 X10*3/UL (ref 150–450)
POTASSIUM SERPL-SCNC: 3.9 MMOL/L (ref 3.5–5.3)
PROT SERPL-MCNC: 6.5 G/DL (ref 6.4–8.2)
RBC # BLD AUTO: 5.06 X10*6/UL (ref 4.5–5.9)
SODIUM SERPL-SCNC: 141 MMOL/L (ref 136–145)
WBC # BLD AUTO: 5.2 X10*3/UL (ref 4.4–11.3)

## 2024-02-13 PROCEDURE — 4010F ACE/ARB THERAPY RXD/TAKEN: CPT | Performed by: PHYSICIAN ASSISTANT

## 2024-02-13 PROCEDURE — 2500000001 HC RX 250 WO HCPCS SELF ADMINISTERED DRUGS (ALT 637 FOR MEDICARE OP): Performed by: INTERNAL MEDICINE

## 2024-02-13 PROCEDURE — 96413 CHEMO IV INFUSION 1 HR: CPT

## 2024-02-13 PROCEDURE — 99214 OFFICE O/P EST MOD 30 MIN: CPT | Performed by: PHYSICIAN ASSISTANT

## 2024-02-13 PROCEDURE — 2500000004 HC RX 250 GENERAL PHARMACY W/ HCPCS (ALT 636 FOR OP/ED): Performed by: INTERNAL MEDICINE

## 2024-02-13 PROCEDURE — 1036F TOBACCO NON-USER: CPT | Performed by: PHYSICIAN ASSISTANT

## 2024-02-13 PROCEDURE — 96375 TX/PRO/DX INJ NEW DRUG ADDON: CPT | Mod: INF

## 2024-02-13 PROCEDURE — 85025 COMPLETE CBC W/AUTO DIFF WBC: CPT

## 2024-02-13 PROCEDURE — 3077F SYST BP >= 140 MM HG: CPT | Performed by: PHYSICIAN ASSISTANT

## 2024-02-13 PROCEDURE — 3080F DIAST BP >= 90 MM HG: CPT | Performed by: PHYSICIAN ASSISTANT

## 2024-02-13 PROCEDURE — 2500000004 HC RX 250 GENERAL PHARMACY W/ HCPCS (ALT 636 FOR OP/ED): Mod: JZ | Performed by: INTERNAL MEDICINE

## 2024-02-13 PROCEDURE — 84075 ASSAY ALKALINE PHOSPHATASE: CPT

## 2024-02-13 RX ORDER — ALBUTEROL SULFATE 0.83 MG/ML
3 SOLUTION RESPIRATORY (INHALATION) AS NEEDED
Status: DISCONTINUED | OUTPATIENT
Start: 2024-02-13 | End: 2024-02-13 | Stop reason: HOSPADM

## 2024-02-13 RX ORDER — PROCHLORPERAZINE EDISYLATE 5 MG/ML
10 INJECTION INTRAMUSCULAR; INTRAVENOUS EVERY 6 HOURS PRN
Status: DISCONTINUED | OUTPATIENT
Start: 2024-02-13 | End: 2024-02-13 | Stop reason: HOSPADM

## 2024-02-13 RX ORDER — PROCHLORPERAZINE MALEATE 10 MG
10 TABLET ORAL EVERY 6 HOURS PRN
Status: DISCONTINUED | OUTPATIENT
Start: 2024-02-13 | End: 2024-02-13 | Stop reason: HOSPADM

## 2024-02-13 RX ORDER — DIPHENHYDRAMINE HYDROCHLORIDE 50 MG/ML
50 INJECTION INTRAMUSCULAR; INTRAVENOUS AS NEEDED
Status: DISCONTINUED | OUTPATIENT
Start: 2024-02-13 | End: 2024-02-13 | Stop reason: HOSPADM

## 2024-02-13 RX ORDER — EPINEPHRINE 0.3 MG/.3ML
0.3 INJECTION SUBCUTANEOUS EVERY 5 MIN PRN
Status: DISCONTINUED | OUTPATIENT
Start: 2024-02-13 | End: 2024-02-13 | Stop reason: HOSPADM

## 2024-02-13 RX ORDER — FAMOTIDINE 10 MG/ML
20 INJECTION INTRAVENOUS ONCE AS NEEDED
Status: DISCONTINUED | OUTPATIENT
Start: 2024-02-13 | End: 2024-02-13 | Stop reason: HOSPADM

## 2024-02-13 RX ORDER — LORAZEPAM 0.5 MG/1
0.5 TABLET ORAL ONCE
Status: COMPLETED | OUTPATIENT
Start: 2024-02-13 | End: 2024-02-13

## 2024-02-13 RX ADMIN — DEXAMETHASONE SODIUM PHOSPHATE 8 MG: 4 INJECTION, SOLUTION INTRA-ARTICULAR; INTRALESIONAL; INTRAMUSCULAR; INTRAVENOUS; SOFT TISSUE at 14:39

## 2024-02-13 RX ADMIN — LORAZEPAM 0.5 MG: 0.5 TABLET ORAL at 14:39

## 2024-02-13 RX ADMIN — SODIUM CHLORIDE 480 MG: 9 INJECTION, SOLUTION INTRAVENOUS at 15:08

## 2024-02-13 ASSESSMENT — PAIN SCALES - GENERAL: PAINLEVEL: 0-NO PAIN

## 2024-02-13 NOTE — PROGRESS NOTES
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  #2) DM, HTN      History of Present Illness:   Completed SBRT to 50Gy in 5fx to the soft tissue recurrence (5.6cm in max diameter at that time), with COT on 05/19/2023.     Patient presents for follow up and treatment. On monthly Opdivo, tolerating well.  No new complaints.     Review of Systems:    All of the systems have been reviewed and are negative for complaints except what is stated in the HPI and/or past medical history.    Family history is negative for first-degree relatives with cancer including breast, ovarian, colon or hematologic malignancies.     Allergies and Intolerances:       Allergies:   No Known Allergies: Active    Current Outpatient Medications on File Prior to Visit   Medication Sig Dispense Refill    amLODIPine (Norvasc) 10 mg tablet Take 1 tablet (10 mg) by mouth once daily. 90 tablet 3    cyanocobalamin (Vitamin B-12) 1,000 mcg tablet Take 1 tablet (1,000 mcg) by mouth once daily.      escitalopram (Lexapro) 10 mg tablet Take 1 tablet (10 mg) by mouth once daily.      lisinopril 10 mg tablet Take 1 tablet (10 mg) by mouth once daily.      LORazepam (Ativan) 1 mg tablet Take 1 tablet (1 mg) by mouth once daily as needed.      losartan (Cozaar) 50 mg tablet Take 1 tablet (50 mg) by mouth once daily.      multivitamin (Daily Vitamin Formula) tablet Take 1 tablet by mouth once daily.       No current facility-administered medications on file prior to visit.              Medical History:   Acute postoperative pain: ICD-10: G89.18, Status: Active   Anemia: ICD-10: D64.9, Status: Active   GI bleed: ICD-10: K92.2, Status: Active   Liver mass: ICD-10: R16.0, Status: Active   Renal lesion: ICD-10: N28.9, Status: Active   History of renal cell carcinoma: ICD-10: Z85.528, Status: Active   DM II  "(diabetes mellitus, type II), controlled: ICD-10: E11.9, Status: Active   Sepsis: ICD-10: A41.9, Status: Active   Hypotensive episode: ICD-10: I95.9, Status: Active   Colitis, acute: ICD-10: K52.9, Status: Active   Colitis: ICD-10: K52.9, Status: Active   Diarrhea: ICD-10: R19.7, Status: Active   Dehydration: ICD-10: E86.0, Status: Active   Acute kidney injury: ICD-10: N17.9, Status: Active   SIRS (systemic inflammatory response syndrome): ICD-10: R65.10, Status: Active   Primary kidney carcinoma with unknown cell type: ICD-10: C64.9, Status: Active       Surg History:   Status post biopsy: ICD-10: Z98.890, Status: Active    Family History: No Family History items are recorded in the problem list.     Social History:   Social Substance History:  · Smoking Status never smoker (1)   · Additional History    Social history: . Living with family. Denies smoking/drug abuse/alcohol.   (1)  Visit Vitals  BP (!) 160/91 (BP Location: Left arm)   Pulse 65   Temp 36.8 °C (98.2 °F)   Resp 16   Wt 122 kg (268 lb 8.3 oz)   SpO2 97%   BMI 38.53 kg/m²   Smoking Status Never   BSA 2.45 m²     Physical Exam:   Constitutional: well appearing, RRR, CTA, abdomen soft NT, ND + BS,  no edema, no adenopathy or masses.  Strength is equal throughout all extremities.     Labs:  Lab Results   Component Value Date    WBC 5.2 02/13/2024    NEUTROABS 3.22 02/13/2024    IGABSOL 0.01 02/13/2024    LYMPHSABS 1.25 02/13/2024    MONOSABS 0.40 02/13/2024    EOSABS 0.24 02/13/2024    BASOSABS 0.03 02/13/2024    RBC 5.06 02/13/2024    MCV 90 02/13/2024    MCHC 33.4 02/13/2024    HGB 15.2 02/13/2024    HCT 45.5 02/13/2024     02/13/2024     No results found for: \"RETICCTPCT\"   Lab Results   Component Value Date    CREATININE 1.37 (H) 02/13/2024    BUN 19 02/13/2024    EGFR 58 (L) 02/13/2024     02/13/2024    K 3.9 02/13/2024     02/13/2024    CO2 28 02/13/2024      Lab Results   Component Value Date    ALT 15 02/13/2024    AST 16 " 02/13/2024    ALKPHOS 59 02/13/2024    BILITOT 0.6 02/13/2024      Lab Results   Component Value Date    TSH 0.51 01/16/2024     Lab Results   Component Value Date    TSH 0.51 01/16/2024     Lab Results   Component Value Date    IRON 10 (L) 02/17/2021    TIBC 369 02/17/2021    FERRITIN 29 04/13/2021      Lab Results   Component Value Date    ONEQEMRU36 234 02/17/2021      Lab Results   Component Value Date    FOLATE 15.1 02/17/2021     Lab Results   Component Value Date     01/18/2022     Lab Results   Component Value Date    HAPTOGLOBIN 146 02/17/2021     Assessment:    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 Excellent response to 4 cycles of Ipi/Nivo. Course was complicated pneumonitis that has resolved. As well as occult GI bleed, unknown etiology, resolved.    Now on maintenance Opdivo doing well. CT in 10/2021 showed interval increase in size of a retroperitoneal soft tissue mass at the right nephrectomy bed currently measuring 24 x 29 mm. MRI abd 12/2021 showed enlarging R hepatic lobe and R pericolonic metastases.    S/p surgical resection of residual disease in liver and in nephrectomy bed in 12/2021. Path c/w residual disease retroperitoneal lymph nodes and liver.    Continue Opdivo Monthly.     ativan 1 mg PO prior to office due to vasovagal with IVs,     3/2023:  CT scan noting oligometastatic disease in the right lower quadrant site of nephrectomy. Seen by rad onc and awaiting treatment.    5/9/2023:  Plan patient to continue Opdivo q. 28 days. Noted to have mild hypercalcemia. Plan for hydration and lab check next week. Will also obtain bone scan.    06/06/2023 patient showing oligometastatic disease.  Right lower quadrant site of nephrectomy.  Patient is status post radiation to the surgical bed with very good effect.  He is to continue Opdivo q. 28 days scheduled today.  Cleared for therapy.    09/26/2023 patient presents today the results of his radiographic imaging indicating  a response to treatment reduction in size no new lesions.  This has been explained to patient.  He will continue Opdivo q. 28 daily.  He has no complaints.    2/13/2024:  Continue Opdivo every 4 weeks. Continue surveillance imaging due in 2/2024. F/U w/rad onc.    RTC in 4 weeks    Patient verbalized understanding, and all his questions were answered to his satisfaction    30 min spent with patient greater than 50 % of which was spent in consultation and coordination of care.

## 2024-02-13 NOTE — SIGNIFICANT EVENT
02/13/24 1422   Prechemo Checklist   Has the patient been in the hospital, ED, or urgent care since last date of service No   Chemo/Immuno Consent Signed Yes   Protocol/Indications Verified Yes   Confirmed to previous date/time of medication Yes   Compared to previous dose Yes   All medications are dated accurately Yes   Pregnancy Test Negative Not applicable   Parameters Met Yes   BSA/Weight-Height Verified Yes   Dose Calculations Verified Yes

## 2024-02-19 ENCOUNTER — HOSPITAL ENCOUNTER (OUTPATIENT)
Dept: RADIOLOGY | Facility: HOSPITAL | Age: 64
Discharge: HOME | End: 2024-02-19
Payer: COMMERCIAL

## 2024-02-19 ENCOUNTER — TELEPHONE (OUTPATIENT)
Dept: RADIATION ONCOLOGY | Facility: HOSPITAL | Age: 64
End: 2024-02-19
Payer: COMMERCIAL

## 2024-02-19 DIAGNOSIS — C64.1 RENAL CELL CARCINOMA OF RIGHT KIDNEY (MULTI): ICD-10-CM

## 2024-02-19 PROCEDURE — 2550000001 HC RX 255 CONTRASTS: Performed by: STUDENT IN AN ORGANIZED HEALTH CARE EDUCATION/TRAINING PROGRAM

## 2024-02-19 PROCEDURE — 74177 CT ABD & PELVIS W/CONTRAST: CPT

## 2024-02-19 PROCEDURE — 71260 CT THORAX DX C+: CPT | Performed by: RADIOLOGY

## 2024-02-19 PROCEDURE — 74177 CT ABD & PELVIS W/CONTRAST: CPT | Performed by: RADIOLOGY

## 2024-02-19 RX ADMIN — IOHEXOL 75 ML: 350 INJECTION, SOLUTION INTRAVENOUS at 11:14

## 2024-02-20 ENCOUNTER — HOSPITAL ENCOUNTER (OUTPATIENT)
Dept: RADIATION ONCOLOGY | Facility: HOSPITAL | Age: 64
Setting detail: RADIATION/ONCOLOGY SERIES
Discharge: HOME | End: 2024-02-20
Payer: COMMERCIAL

## 2024-02-20 VITALS
WEIGHT: 268.4 LBS | RESPIRATION RATE: 18 BRPM | DIASTOLIC BLOOD PRESSURE: 91 MMHG | SYSTOLIC BLOOD PRESSURE: 131 MMHG | BODY MASS INDEX: 38.42 KG/M2 | HEIGHT: 70 IN | HEART RATE: 82 BPM | OXYGEN SATURATION: 97 % | TEMPERATURE: 97.3 F

## 2024-02-20 DIAGNOSIS — C64.1 RENAL CELL CARCINOMA OF RIGHT KIDNEY (MULTI): Primary | ICD-10-CM

## 2024-02-20 DIAGNOSIS — C64.1 RENAL CELL CARCINOMA OF RIGHT KIDNEY (MULTI): ICD-10-CM

## 2024-02-20 PROCEDURE — 99213 OFFICE O/P EST LOW 20 MIN: CPT | Performed by: RADIOLOGY

## 2024-02-20 PROCEDURE — 99213 OFFICE O/P EST LOW 20 MIN: CPT | Mod: GC | Performed by: RADIOLOGY

## 2024-02-20 ASSESSMENT — COLUMBIA-SUICIDE SEVERITY RATING SCALE - C-SSRS
1. IN THE PAST MONTH, HAVE YOU WISHED YOU WERE DEAD OR WISHED YOU COULD GO TO SLEEP AND NOT WAKE UP?: NO
6. HAVE YOU EVER DONE ANYTHING, STARTED TO DO ANYTHING, OR PREPARED TO DO ANYTHING TO END YOUR LIFE?: NO
2. HAVE YOU ACTUALLY HAD ANY THOUGHTS OF KILLING YOURSELF?: NO

## 2024-02-20 ASSESSMENT — ENCOUNTER SYMPTOMS
LOSS OF SENSATION IN FEET: 0
DEPRESSION: 0
OCCASIONAL FEELINGS OF UNSTEADINESS: 0

## 2024-02-20 NOTE — PROGRESS NOTES
Staff Physician: Breana Arora MD  Referring Physician: Jadiel Gonsalez MD  Date of Service: 2/20/2024  RADIATION ONCOLOGY FOLLOW UP NOTE  IDENTIFYING DATA:   Cancer Staging   No matching staging information was found for the patient.  Problem List Items Addressed This Visit       Renal cell carcinoma of right kidney (CMS/HCC)    Relevant Orders    Clinic Appointment Request Follow Up; BREANA ARORA; [facility] S600 RADON (Completed)       Mr. Burns is a 62-year-old man with originally [id number] Stage III renal cell carcinoma, status post right nephrectomy, with subsequent recurrence at the resection bed with direct extension to the liver, status-post resection and partial hepatectomy,  then placed on immunotherapy, who developed a second recurrence in the soft tissue at the nephrectomy bed. He then completed SBRT to 50Gy in 5fx to the soft tissue recurrence (5.6cm in max diameter at that time), with COT on 05/19/2023.     He presents today for routine interval follow-up.    INTERVAL HISTORY  Since we last saw Jason Burns in clinic on 11/21/23, he has felt well.  His weight is stable from last visit and his appetite is unchanged. He denies any significant symptoms at this time. Specifically, he denies abdominal pain, diarrhea, constipation, dysuria, or change in urinary frequency.  He has had no new medical problems or medications since our last visit.      His last imaging, comprising of CT C/A/P on 2/19/24 demonstrates stable nodular foci in the surgical bed and no evidence of local or distant mets.    PAST MEDICAL, SURGICAL, FAMILY, AND SOCIAL HISTORY:  Past Medical History:   Diagnosis Date    Campylobacter enteritis 11/02/2018    Campylobacter gastroenteritis    Personal history of other diseases of the digestive system 08/11/2016    History of umbilical hernia    Plantar fascial fibromatosis 08/19/2015    Plantar fasciitis    Renal cell cancer (CMS/HCC)     Unspecified sprain of unspecified great  toe, initial encounter 2015    Sprain of great toe     Past Surgical History:   Procedure Laterality Date    COLONOSCOPY  10/21/2013    Complete Colonoscopy    CT ABDOMEN PELVIS ANGIOGRAM W AND/OR WO IV CONTRAST  2021    CT ABDOMEN PELVIS ANGIOGRAM W AND/OR WO IV CONTRAST 2021 [facility]    CT ABDOMEN PELVIS ANGIOGRAM W AND/OR WO IV CONTRAST  2/10/2021    CT ABDOMEN PELVIS ANGIOGRAM W AND/OR WO IV CONTRAST 2/10/2021 [facility]    UMBILICAL HERNIA REPAIR  2018    Umbilical Hernia Repair    US GUIDED NEEDLE LIVER BIOPSY  10/12/2020    US GUIDED NEEDLE LIVER BIOPSY 10/12/2020 [facility]     ALLERGIES:  No Known Allergies  MEDICATIONS:    Current Outpatient Medications:     amLODIPine (Norvasc) 10 mg tablet, Take 1 tablet (10 mg) by mouth once daily., Disp: 90 tablet, Rfl: 3    cyanocobalamin (Vitamin B-12) 1,000 mcg tablet, Take 1 tablet (1,000 mcg) by mouth once daily., Disp: , Rfl:     escitalopram (Lexapro) 10 mg tablet, Take 1 tablet (10 mg) by mouth once daily., Disp: , Rfl:     multivitamin (Daily Vitamin Formula) tablet, Take 1 tablet by mouth once daily., Disp: , Rfl:     lisinopril 10 mg tablet, Take 1 tablet (10 mg) by mouth once daily., Disp: , Rfl:     LORazepam (Ativan) 1 mg tablet, Take 1 tablet (1 mg) by mouth once daily as needed., Disp: , Rfl:     losartan (Cozaar) 50 mg tablet, Take 1 tablet (50 mg) by mouth once daily., Disp: , Rfl:      REVIEW OF SYSTEMS:  Except for the symptoms described in the interval history, the review of systems is negative. Specifically, except as noted, when asked the patient expressed no complaints relative to constitutional (fever, weight loss), eyes, ears, nose, mouth, throat, neurologic, cardiovascular, pulmonary, breast, GI, , skin, musculoskeletal, endocrine, hematologic/lymphatic, or immunologic systems.    PERFORMANCE STATUS:  Karnofsky Performance Score/ECO, Fully active, able to carry on all pre-disease performed  "without restriction (ECOG equivalent 0)    PHYSICAL EXAMINATION:  BP (!) 131/91   Pulse 82   Temp 36.3 °C (97.3 °F) (Temporal)   Resp 18   Ht 1.778 m (5' 10\")   Wt 122 kg (268 lb 6.4 oz)   SpO2 97%   BMI 38.51 kg/m²   Pain score: 0/10  General: no acute distress, engaged in conversation. No jaundice.  HEENT: Normocephalic, atraumatic. Extraocular movements are intact.   Neck: supple with trachea at midline, no palpable adenopathy.   Pulmonary: Breathing comfortably on room air, no respiratory distress  Cardiovascular: Regular rate, no cyanosis, well-perfused  Abdomen: Soft, nontender, nondistended.   Extremities: No lower extremity edema or cyanosis.   Musculoskeletal: Normal range of motion. Able to raise both arms above head without issues  Skin: Without rash or obvious lesions.   Neurologic: Alert and oriented x3. Cranial nerves grossly intact.     DIAGNOSTIC REPORTS REVIEWED:  Imaging: All imaging was personally reviewed and interpreted in clinic. Findings as per interval history and EMR.  Laboratory/Pathology:  All pertinent labs and pathology were personally reviewed and interpreted in clinic. Findings as per interval history and EMR.     IMPRESSION:  Mr. Burns is a 62-year-old man with originally [id number] Stage III renal cell carcinoma, status post right nephrectomy, with subsequent recurrence at the resection bed with direct extension to the liver, status-post resection and partial hepatectomy,  then placed on immunotherapy, who developed a second recurrence in the soft tissue at the nephrectomy bed. S/p SBRT 50Gy in 5fx to the soft tissue recurrence (5.6cm in max diameter at that time), with COT on 05/19/2023.     PLAN:  I have asked Jason Burns to please return to clinic in 3 month's time with repeat CT-C/A/P with CBC, and CMP at that time, to be coordinated with the multidisciplinary team. He knows to call with any questions or concerns in the interim.    PAIN PLAN: The patient reports " their pain is well-controlled on their current regimen.  Their pain regimen is currently managed by Primary Care.    DISEASE STATUS: Controlled  NEW METACHRONOUS CANCER: No    Thank you for the opportunity to participate in the ongoing care of this pleasant man.    Marylou Judge DO,MS,MPH  PGY-3

## 2024-02-27 ENCOUNTER — APPOINTMENT (OUTPATIENT)
Dept: RADIATION ONCOLOGY | Facility: HOSPITAL | Age: 64
End: 2024-02-27
Payer: COMMERCIAL

## 2024-03-12 ENCOUNTER — INFUSION (OUTPATIENT)
Dept: HEMATOLOGY/ONCOLOGY | Facility: CLINIC | Age: 64
End: 2024-03-12
Payer: COMMERCIAL

## 2024-03-12 VITALS
RESPIRATION RATE: 18 BRPM | BODY MASS INDEX: 38.7 KG/M2 | HEART RATE: 73 BPM | SYSTOLIC BLOOD PRESSURE: 150 MMHG | DIASTOLIC BLOOD PRESSURE: 90 MMHG | WEIGHT: 269.73 LBS | TEMPERATURE: 97.7 F | OXYGEN SATURATION: 96 %

## 2024-03-12 DIAGNOSIS — C64.1 RENAL CELL CARCINOMA OF RIGHT KIDNEY (MULTI): Primary | ICD-10-CM

## 2024-03-12 LAB
ALBUMIN SERPL BCP-MCNC: 4.2 G/DL (ref 3.4–5)
ALP SERPL-CCNC: 70 U/L (ref 33–136)
ALT SERPL W P-5'-P-CCNC: 19 U/L (ref 10–52)
ANION GAP SERPL CALC-SCNC: 13 MMOL/L (ref 10–20)
AST SERPL W P-5'-P-CCNC: 16 U/L (ref 9–39)
BASOPHILS # BLD AUTO: 0.04 X10*3/UL (ref 0–0.1)
BASOPHILS NFR BLD AUTO: 0.8 %
BILIRUB SERPL-MCNC: 0.6 MG/DL (ref 0–1.2)
BUN SERPL-MCNC: 17 MG/DL (ref 6–23)
CALCIUM SERPL-MCNC: 9.1 MG/DL (ref 8.6–10.3)
CHLORIDE SERPL-SCNC: 102 MMOL/L (ref 98–107)
CO2 SERPL-SCNC: 28 MMOL/L (ref 21–32)
CORTIS AM PEAK SERPL-MSCNC: 7.5 UG/DL (ref 5–20)
CREAT SERPL-MCNC: 1.23 MG/DL (ref 0.5–1.3)
EGFRCR SERPLBLD CKD-EPI 2021: 66 ML/MIN/1.73M*2
EOSINOPHIL # BLD AUTO: 0.25 X10*3/UL (ref 0–0.7)
EOSINOPHIL NFR BLD AUTO: 4.9 %
ERYTHROCYTE [DISTWIDTH] IN BLOOD BY AUTOMATED COUNT: 12.3 % (ref 11.5–14.5)
GLUCOSE SERPL-MCNC: 171 MG/DL (ref 74–99)
HCT VFR BLD AUTO: 45.1 % (ref 41–52)
HGB BLD-MCNC: 15.4 G/DL (ref 13.5–17.5)
IMM GRANULOCYTES # BLD AUTO: 0.02 X10*3/UL (ref 0–0.7)
IMM GRANULOCYTES NFR BLD AUTO: 0.4 % (ref 0–0.9)
LYMPHOCYTES # BLD AUTO: 0.99 X10*3/UL (ref 1.2–4.8)
LYMPHOCYTES NFR BLD AUTO: 19.4 %
MCH RBC QN AUTO: 29.7 PG (ref 26–34)
MCHC RBC AUTO-ENTMCNC: 34.1 G/DL (ref 32–36)
MCV RBC AUTO: 87 FL (ref 80–100)
MONOCYTES # BLD AUTO: 0.42 X10*3/UL (ref 0.1–1)
MONOCYTES NFR BLD AUTO: 8.2 %
NEUTROPHILS # BLD AUTO: 3.39 X10*3/UL (ref 1.2–7.7)
NEUTROPHILS NFR BLD AUTO: 66.3 %
PLATELET # BLD AUTO: 207 X10*3/UL (ref 150–450)
POTASSIUM SERPL-SCNC: 3.7 MMOL/L (ref 3.5–5.3)
PROT SERPL-MCNC: 7.2 G/DL (ref 6.4–8.2)
RBC # BLD AUTO: 5.18 X10*6/UL (ref 4.5–5.9)
SODIUM SERPL-SCNC: 139 MMOL/L (ref 136–145)
T4 FREE SERPL-MCNC: 0.83 NG/DL (ref 0.61–1.12)
TSH SERPL-ACNC: 0.38 MIU/L (ref 0.44–3.98)
WBC # BLD AUTO: 5.1 X10*3/UL (ref 4.4–11.3)

## 2024-03-12 PROCEDURE — 84443 ASSAY THYROID STIM HORMONE: CPT

## 2024-03-12 PROCEDURE — 96413 CHEMO IV INFUSION 1 HR: CPT

## 2024-03-12 PROCEDURE — 82533 TOTAL CORTISOL: CPT | Mod: GEALAB

## 2024-03-12 PROCEDURE — 84439 ASSAY OF FREE THYROXINE: CPT

## 2024-03-12 PROCEDURE — 2500000004 HC RX 250 GENERAL PHARMACY W/ HCPCS (ALT 636 FOR OP/ED): Performed by: INTERNAL MEDICINE

## 2024-03-12 PROCEDURE — 82024 ASSAY OF ACTH: CPT

## 2024-03-12 PROCEDURE — 36415 COLL VENOUS BLD VENIPUNCTURE: CPT

## 2024-03-12 PROCEDURE — 2500000001 HC RX 250 WO HCPCS SELF ADMINISTERED DRUGS (ALT 637 FOR MEDICARE OP): Performed by: INTERNAL MEDICINE

## 2024-03-12 PROCEDURE — 2500000004 HC RX 250 GENERAL PHARMACY W/ HCPCS (ALT 636 FOR OP/ED): Mod: JZ | Performed by: INTERNAL MEDICINE

## 2024-03-12 PROCEDURE — 80053 COMPREHEN METABOLIC PANEL: CPT

## 2024-03-12 PROCEDURE — 96375 TX/PRO/DX INJ NEW DRUG ADDON: CPT | Mod: INF

## 2024-03-12 PROCEDURE — 85025 COMPLETE CBC W/AUTO DIFF WBC: CPT

## 2024-03-12 RX ORDER — PROCHLORPERAZINE MALEATE 10 MG
10 TABLET ORAL EVERY 6 HOURS PRN
Status: CANCELLED | OUTPATIENT
Start: 2024-04-09

## 2024-03-12 RX ORDER — PROCHLORPERAZINE EDISYLATE 5 MG/ML
10 INJECTION INTRAMUSCULAR; INTRAVENOUS EVERY 6 HOURS PRN
Status: CANCELLED | OUTPATIENT
Start: 2024-06-04

## 2024-03-12 RX ORDER — DIPHENHYDRAMINE HYDROCHLORIDE 50 MG/ML
50 INJECTION INTRAMUSCULAR; INTRAVENOUS AS NEEDED
Status: CANCELLED | OUTPATIENT
Start: 2024-05-07

## 2024-03-12 RX ORDER — PROCHLORPERAZINE EDISYLATE 5 MG/ML
10 INJECTION INTRAMUSCULAR; INTRAVENOUS EVERY 6 HOURS PRN
Status: CANCELLED | OUTPATIENT
Start: 2024-05-07

## 2024-03-12 RX ORDER — PROCHLORPERAZINE EDISYLATE 5 MG/ML
10 INJECTION INTRAMUSCULAR; INTRAVENOUS EVERY 6 HOURS PRN
Status: DISCONTINUED | OUTPATIENT
Start: 2024-03-12 | End: 2024-03-12 | Stop reason: HOSPADM

## 2024-03-12 RX ORDER — FAMOTIDINE 10 MG/ML
20 INJECTION INTRAVENOUS ONCE AS NEEDED
Status: CANCELLED | OUTPATIENT
Start: 2024-06-04

## 2024-03-12 RX ORDER — HEPARIN SODIUM,PORCINE/PF 10 UNIT/ML
50 SYRINGE (ML) INTRAVENOUS AS NEEDED
OUTPATIENT
Start: 2024-03-12

## 2024-03-12 RX ORDER — FAMOTIDINE 10 MG/ML
20 INJECTION INTRAVENOUS ONCE AS NEEDED
Status: CANCELLED | OUTPATIENT
Start: 2024-04-09

## 2024-03-12 RX ORDER — FAMOTIDINE 10 MG/ML
20 INJECTION INTRAVENOUS ONCE AS NEEDED
Status: CANCELLED | OUTPATIENT
Start: 2024-05-07

## 2024-03-12 RX ORDER — EPINEPHRINE 0.3 MG/.3ML
0.3 INJECTION SUBCUTANEOUS EVERY 5 MIN PRN
Status: DISCONTINUED | OUTPATIENT
Start: 2024-03-12 | End: 2024-03-12 | Stop reason: HOSPADM

## 2024-03-12 RX ORDER — LORAZEPAM 0.5 MG/1
0.5 TABLET ORAL ONCE
Status: CANCELLED
Start: 2024-04-09 | End: 2024-04-09

## 2024-03-12 RX ORDER — ALBUTEROL SULFATE 0.83 MG/ML
3 SOLUTION RESPIRATORY (INHALATION) AS NEEDED
Status: CANCELLED | OUTPATIENT
Start: 2024-04-09

## 2024-03-12 RX ORDER — FAMOTIDINE 10 MG/ML
20 INJECTION INTRAVENOUS ONCE AS NEEDED
Status: DISCONTINUED | OUTPATIENT
Start: 2024-03-12 | End: 2024-03-12 | Stop reason: HOSPADM

## 2024-03-12 RX ORDER — LORAZEPAM 0.5 MG/1
0.5 TABLET ORAL ONCE
Status: CANCELLED
Start: 2024-06-04 | End: 2024-06-04

## 2024-03-12 RX ORDER — ALBUTEROL SULFATE 0.83 MG/ML
3 SOLUTION RESPIRATORY (INHALATION) AS NEEDED
Status: CANCELLED | OUTPATIENT
Start: 2024-06-04

## 2024-03-12 RX ORDER — DIPHENHYDRAMINE HYDROCHLORIDE 50 MG/ML
50 INJECTION INTRAMUSCULAR; INTRAVENOUS AS NEEDED
Status: CANCELLED | OUTPATIENT
Start: 2024-06-04

## 2024-03-12 RX ORDER — ALBUTEROL SULFATE 0.83 MG/ML
3 SOLUTION RESPIRATORY (INHALATION) AS NEEDED
Status: CANCELLED | OUTPATIENT
Start: 2024-05-07

## 2024-03-12 RX ORDER — EPINEPHRINE 0.3 MG/.3ML
0.3 INJECTION SUBCUTANEOUS EVERY 5 MIN PRN
Status: CANCELLED | OUTPATIENT
Start: 2024-05-07

## 2024-03-12 RX ORDER — HEPARIN 100 UNIT/ML
500 SYRINGE INTRAVENOUS AS NEEDED
OUTPATIENT
Start: 2024-03-12

## 2024-03-12 RX ORDER — EPINEPHRINE 0.3 MG/.3ML
0.3 INJECTION SUBCUTANEOUS EVERY 5 MIN PRN
Status: CANCELLED | OUTPATIENT
Start: 2024-04-09

## 2024-03-12 RX ORDER — PROCHLORPERAZINE MALEATE 10 MG
10 TABLET ORAL EVERY 6 HOURS PRN
Status: DISCONTINUED | OUTPATIENT
Start: 2024-03-12 | End: 2024-03-12 | Stop reason: HOSPADM

## 2024-03-12 RX ORDER — DIPHENHYDRAMINE HYDROCHLORIDE 50 MG/ML
50 INJECTION INTRAMUSCULAR; INTRAVENOUS AS NEEDED
Status: DISCONTINUED | OUTPATIENT
Start: 2024-03-12 | End: 2024-03-12 | Stop reason: HOSPADM

## 2024-03-12 RX ORDER — EPINEPHRINE 0.3 MG/.3ML
0.3 INJECTION SUBCUTANEOUS EVERY 5 MIN PRN
Status: CANCELLED | OUTPATIENT
Start: 2024-06-04

## 2024-03-12 RX ORDER — LORAZEPAM 0.5 MG/1
0.5 TABLET ORAL ONCE
Status: CANCELLED
Start: 2024-05-07 | End: 2024-05-07

## 2024-03-12 RX ORDER — PROCHLORPERAZINE MALEATE 10 MG
10 TABLET ORAL EVERY 6 HOURS PRN
Status: CANCELLED | OUTPATIENT
Start: 2024-06-04

## 2024-03-12 RX ORDER — LORAZEPAM 0.5 MG/1
0.5 TABLET ORAL ONCE
Status: COMPLETED | OUTPATIENT
Start: 2024-03-12 | End: 2024-03-12

## 2024-03-12 RX ORDER — DIPHENHYDRAMINE HYDROCHLORIDE 50 MG/ML
50 INJECTION INTRAMUSCULAR; INTRAVENOUS AS NEEDED
Status: CANCELLED | OUTPATIENT
Start: 2024-04-09

## 2024-03-12 RX ORDER — ALBUTEROL SULFATE 0.83 MG/ML
3 SOLUTION RESPIRATORY (INHALATION) AS NEEDED
Status: DISCONTINUED | OUTPATIENT
Start: 2024-03-12 | End: 2024-03-12 | Stop reason: HOSPADM

## 2024-03-12 RX ORDER — PROCHLORPERAZINE EDISYLATE 5 MG/ML
10 INJECTION INTRAMUSCULAR; INTRAVENOUS EVERY 6 HOURS PRN
Status: CANCELLED | OUTPATIENT
Start: 2024-04-09

## 2024-03-12 RX ORDER — PROCHLORPERAZINE MALEATE 10 MG
10 TABLET ORAL EVERY 6 HOURS PRN
Status: CANCELLED | OUTPATIENT
Start: 2024-05-07

## 2024-03-12 RX ADMIN — SODIUM CHLORIDE 480 MG: 9 INJECTION, SOLUTION INTRAVENOUS at 10:57

## 2024-03-12 RX ADMIN — DEXAMETHASONE SODIUM PHOSPHATE 8 MG: 4 INJECTION INTRA-ARTICULAR; INTRALESIONAL; INTRAMUSCULAR; INTRAVENOUS; SOFT TISSUE at 10:44

## 2024-03-12 RX ADMIN — LORAZEPAM 0.5 MG: 0.5 TABLET ORAL at 10:44

## 2024-03-12 ASSESSMENT — PAIN SCALES - GENERAL: PAINLEVEL: 0-NO PAIN

## 2024-03-12 NOTE — PROGRESS NOTES
Pt tolerated treatment without incident.  Gait steady upon DC home.  Pt denies further questions/concerns/comments and agrees to POC going forward.

## 2024-03-14 LAB — ACTH PLAS-MCNC: 53.1 PG/ML (ref 7.2–63.3)

## 2024-03-31 NOTE — ADDENDUM NOTE
Encounter addended by: Breana Arora MD on: 3/31/2024 7:45 PM   Actions taken: Level of Service modified, Cosign clinical note with attestation, Clinical Note Signed

## 2024-04-09 ENCOUNTER — OFFICE VISIT (OUTPATIENT)
Dept: HEMATOLOGY/ONCOLOGY | Facility: CLINIC | Age: 64
End: 2024-04-09
Payer: COMMERCIAL

## 2024-04-09 ENCOUNTER — INFUSION (OUTPATIENT)
Dept: HEMATOLOGY/ONCOLOGY | Facility: CLINIC | Age: 64
End: 2024-04-09
Payer: COMMERCIAL

## 2024-04-09 VITALS
BODY MASS INDEX: 38.18 KG/M2 | SYSTOLIC BLOOD PRESSURE: 154 MMHG | OXYGEN SATURATION: 98 % | RESPIRATION RATE: 16 BRPM | HEART RATE: 90 BPM | WEIGHT: 266.1 LBS | DIASTOLIC BLOOD PRESSURE: 92 MMHG | TEMPERATURE: 97.5 F

## 2024-04-09 DIAGNOSIS — C64.1 RENAL CELL CARCINOMA OF RIGHT KIDNEY (MULTI): ICD-10-CM

## 2024-04-09 LAB
ALBUMIN SERPL BCP-MCNC: 4.1 G/DL (ref 3.4–5)
ALP SERPL-CCNC: 60 U/L (ref 33–136)
ALT SERPL W P-5'-P-CCNC: 15 U/L (ref 10–52)
ANION GAP SERPL CALC-SCNC: 14 MMOL/L (ref 10–20)
AST SERPL W P-5'-P-CCNC: 15 U/L (ref 9–39)
BASOPHILS # BLD AUTO: 0.03 X10*3/UL (ref 0–0.1)
BASOPHILS NFR BLD AUTO: 0.4 %
BILIRUB SERPL-MCNC: 0.7 MG/DL (ref 0–1.2)
BUN SERPL-MCNC: 19 MG/DL (ref 6–23)
CALCIUM SERPL-MCNC: 9.5 MG/DL (ref 8.6–10.3)
CHLORIDE SERPL-SCNC: 104 MMOL/L (ref 98–107)
CO2 SERPL-SCNC: 25 MMOL/L (ref 21–32)
CORTIS AM PEAK SERPL-MSCNC: 11.6 UG/DL (ref 5–20)
CREAT SERPL-MCNC: 1.34 MG/DL (ref 0.5–1.3)
EGFRCR SERPLBLD CKD-EPI 2021: 60 ML/MIN/1.73M*2
EOSINOPHIL # BLD AUTO: 0.26 X10*3/UL (ref 0–0.7)
EOSINOPHIL NFR BLD AUTO: 3.3 %
ERYTHROCYTE [DISTWIDTH] IN BLOOD BY AUTOMATED COUNT: 12.5 % (ref 11.5–14.5)
GLUCOSE SERPL-MCNC: 166 MG/DL (ref 74–99)
HCT VFR BLD AUTO: 44 % (ref 41–52)
HGB BLD-MCNC: 14.9 G/DL (ref 13.5–17.5)
IMM GRANULOCYTES # BLD AUTO: 0.01 X10*3/UL (ref 0–0.7)
IMM GRANULOCYTES NFR BLD AUTO: 0.1 % (ref 0–0.9)
LYMPHOCYTES # BLD AUTO: 0.85 X10*3/UL (ref 1.2–4.8)
LYMPHOCYTES NFR BLD AUTO: 10.9 %
MCH RBC QN AUTO: 29.8 PG (ref 26–34)
MCHC RBC AUTO-ENTMCNC: 33.9 G/DL (ref 32–36)
MCV RBC AUTO: 88 FL (ref 80–100)
MONOCYTES # BLD AUTO: 0.48 X10*3/UL (ref 0.1–1)
MONOCYTES NFR BLD AUTO: 6.2 %
NEUTROPHILS # BLD AUTO: 6.14 X10*3/UL (ref 1.2–7.7)
NEUTROPHILS NFR BLD AUTO: 79.1 %
PLATELET # BLD AUTO: 189 X10*3/UL (ref 150–450)
POTASSIUM SERPL-SCNC: 3.9 MMOL/L (ref 3.5–5.3)
PROT SERPL-MCNC: 7.4 G/DL (ref 6.4–8.2)
RBC # BLD AUTO: 5 X10*6/UL (ref 4.5–5.9)
SODIUM SERPL-SCNC: 139 MMOL/L (ref 136–145)
T4 FREE SERPL-MCNC: 0.78 NG/DL (ref 0.61–1.12)
TSH SERPL-ACNC: 0.36 MIU/L (ref 0.44–3.98)
WBC # BLD AUTO: 7.8 X10*3/UL (ref 4.4–11.3)

## 2024-04-09 PROCEDURE — 96413 CHEMO IV INFUSION 1 HR: CPT

## 2024-04-09 PROCEDURE — 82533 TOTAL CORTISOL: CPT | Mod: GEALAB

## 2024-04-09 PROCEDURE — 84439 ASSAY OF FREE THYROXINE: CPT

## 2024-04-09 PROCEDURE — 80053 COMPREHEN METABOLIC PANEL: CPT

## 2024-04-09 PROCEDURE — 85025 COMPLETE CBC W/AUTO DIFF WBC: CPT

## 2024-04-09 PROCEDURE — 82024 ASSAY OF ACTH: CPT

## 2024-04-09 PROCEDURE — 2500000004 HC RX 250 GENERAL PHARMACY W/ HCPCS (ALT 636 FOR OP/ED): Performed by: INTERNAL MEDICINE

## 2024-04-09 PROCEDURE — 2500000004 HC RX 250 GENERAL PHARMACY W/ HCPCS (ALT 636 FOR OP/ED): Mod: JZ | Performed by: INTERNAL MEDICINE

## 2024-04-09 PROCEDURE — 2500000001 HC RX 250 WO HCPCS SELF ADMINISTERED DRUGS (ALT 637 FOR MEDICARE OP): Performed by: INTERNAL MEDICINE

## 2024-04-09 PROCEDURE — 84443 ASSAY THYROID STIM HORMONE: CPT

## 2024-04-09 PROCEDURE — 96375 TX/PRO/DX INJ NEW DRUG ADDON: CPT | Mod: INF

## 2024-04-09 RX ORDER — PROCHLORPERAZINE EDISYLATE 5 MG/ML
10 INJECTION INTRAMUSCULAR; INTRAVENOUS EVERY 6 HOURS PRN
Status: DISCONTINUED | OUTPATIENT
Start: 2024-04-09 | End: 2024-04-09 | Stop reason: HOSPADM

## 2024-04-09 RX ORDER — EPINEPHRINE 0.3 MG/.3ML
0.3 INJECTION SUBCUTANEOUS EVERY 5 MIN PRN
Status: DISCONTINUED | OUTPATIENT
Start: 2024-04-09 | End: 2024-04-09 | Stop reason: HOSPADM

## 2024-04-09 RX ORDER — PROCHLORPERAZINE MALEATE 10 MG
10 TABLET ORAL EVERY 6 HOURS PRN
Status: DISCONTINUED | OUTPATIENT
Start: 2024-04-09 | End: 2024-04-09 | Stop reason: HOSPADM

## 2024-04-09 RX ORDER — FAMOTIDINE 10 MG/ML
20 INJECTION INTRAVENOUS ONCE AS NEEDED
Status: DISCONTINUED | OUTPATIENT
Start: 2024-04-09 | End: 2024-04-09 | Stop reason: HOSPADM

## 2024-04-09 RX ORDER — LORAZEPAM 0.5 MG/1
0.5 TABLET ORAL ONCE
Status: COMPLETED | OUTPATIENT
Start: 2024-04-09 | End: 2024-04-09

## 2024-04-09 RX ORDER — ALBUTEROL SULFATE 0.83 MG/ML
3 SOLUTION RESPIRATORY (INHALATION) AS NEEDED
Status: DISCONTINUED | OUTPATIENT
Start: 2024-04-09 | End: 2024-04-09 | Stop reason: HOSPADM

## 2024-04-09 RX ORDER — DIPHENHYDRAMINE HYDROCHLORIDE 50 MG/ML
50 INJECTION INTRAMUSCULAR; INTRAVENOUS AS NEEDED
Status: DISCONTINUED | OUTPATIENT
Start: 2024-04-09 | End: 2024-04-09 | Stop reason: HOSPADM

## 2024-04-09 RX ADMIN — SODIUM CHLORIDE 480 MG: 9 INJECTION, SOLUTION INTRAVENOUS at 10:50

## 2024-04-09 RX ADMIN — LORAZEPAM 0.5 MG: 0.5 TABLET ORAL at 10:13

## 2024-04-09 RX ADMIN — DEXAMETHASONE SODIUM PHOSPHATE 8 MG: 4 INJECTION INTRA-ARTICULAR; INTRALESIONAL; INTRAMUSCULAR; INTRAVENOUS; SOFT TISSUE at 10:13

## 2024-04-09 ASSESSMENT — PAIN SCALES - GENERAL: PAINLEVEL: 0-NO PAIN

## 2024-04-09 NOTE — PROGRESS NOTES
Patient ID: Jason Burns is a 63 y.o. male.  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  #2) DM, HTN        History of Present Illness:   Completed SBRT to 50Gy in 5fx to the soft tissue recurrence (5.6cm in max diameter at that time), with COT on 05/19/2023. Patient presents for follow up and treatment. On monthly Opdivo, tolerating well.   Review of Systems - Oncology     Objective   BSA: There is no height or weight on file to calculate BSA.  There were no vitals taken for this visit.     has a past medical history of Campylobacter enteritis (11/02/2018), Personal history of other diseases of the digestive system (08/11/2016), Plantar fascial fibromatosis (08/19/2015), Renal cell cancer (CMS/HCC), and Unspecified sprain of unspecified great toe, initial encounter (08/19/2015).   has a past surgical history that includes Colonoscopy (10/21/2013); Umbilical hernia repair (04/25/2018); US guided needle liver biopsy (10/12/2020); CT angio abdomen pelvis w and or wo IV IV contrast (2/7/2021); and CT angio abdomen pelvis w and or wo IV IV contrast (2/10/2021).  Family History   Problem Relation Name Age of Onset    Pancreatic cancer Brother Jed      Oncology History   Renal cell carcinoma of right kidney (CMS/HCC)   10/22/2023 Initial Diagnosis    Renal cell carcinoma of right kidney (CMS/HCC)     10/24/2023 -  Chemotherapy    Nivolumab 480 mg, 28 Day Cycles         Jason Burns  reports that he has never smoked. He has never used smokeless tobacco.  He  reports current alcohol use.  He  reports that he does not currently use drugs.    Physical Exam    WBC   Date/Time Value Ref Range Status    03/12/2024 09:45 AM 5.1 4.4 - 11.3 x10*3/uL Final   02/13/2024 01:29 PM 5.2 4.4 - 11.3 x10*3/uL Final   01/16/2024 11:13 AM 5.7 4.4 - 11.3 x10*3/uL Final     nRBC   Date Value Ref Range Status   11/21/2023 0.0 0.0 - 0.0 /100 WBCs Final   08/29/2023 CANCELED 0.0 - 0.0 /100 WBC      Comment:     Result canceled by the ancillary.   08/23/2023 CANCELED       Comment:     Result canceled by the ancillary.   05/19/2023 CANCELED       Comment:     Result canceled by the ancillary.     RBC   Date Value Ref Range Status   03/12/2024 5.18 4.50 - 5.90 x10*6/uL Final   02/13/2024 5.06 4.50 - 5.90 x10*6/uL Final   01/16/2024 5.29 4.50 - 5.90 x10*6/uL Final     Hemoglobin   Date Value Ref Range Status   03/12/2024 15.4 13.5 - 17.5 g/dL Final   02/13/2024 15.2 13.5 - 17.5 g/dL Final   01/16/2024 15.9 13.5 - 17.5 g/dL Final     Hematocrit   Date Value Ref Range Status   03/12/2024 45.1 41.0 - 52.0 % Final   02/13/2024 45.5 41.0 - 52.0 % Final   01/16/2024 47.7 41.0 - 52.0 % Final     MCV   Date/Time Value Ref Range Status   03/12/2024 09:45 AM 87 80 - 100 fL Final   02/13/2024 01:29 PM 90 80 - 100 fL Final   01/16/2024 11:13 AM 90 80 - 100 fL Final     MCH   Date/Time Value Ref Range Status   03/12/2024 09:45 AM 29.7 26.0 - 34.0 pg Final   02/13/2024 01:29 PM 30.0 26.0 - 34.0 pg Final   01/16/2024 11:13 AM 30.1 26.0 - 34.0 pg Final     MCHC   Date/Time Value Ref Range Status   03/12/2024 09:45 AM 34.1 32.0 - 36.0 g/dL Final   02/13/2024 01:29 PM 33.4 32.0 - 36.0 g/dL Final   01/16/2024 11:13 AM 33.3 32.0 - 36.0 g/dL Final     RDW   Date/Time Value Ref Range Status   03/12/2024 09:45 AM 12.3 11.5 - 14.5 % Final   02/13/2024 01:29 PM 12.9 11.5 - 14.5 % Final   01/16/2024 11:13 AM 12.8 11.5 - 14.5 % Final     Platelets   Date/Time Value Ref Range Status   03/12/2024 09:45  150 - 450 x10*3/uL Final   02/13/2024 01:29  150 - 450 x10*3/uL Final   01/16/2024 11:13  150 - 450 x10*3/uL Final     MPV   Date/Time Value Ref  Range Status   10/24/2023 02:34 PM 9.6 7.5 - 11.5 fL Final     Neutrophils %   Date/Time Value Ref Range Status   03/12/2024 09:45 AM 66.3 40.0 - 80.0 % Final   02/13/2024 01:29 PM 62.4 40.0 - 80.0 % Final   01/16/2024 11:13 AM 64.0 40.0 - 80.0 % Final     Immature Granulocytes %, Automated   Date/Time Value Ref Range Status   03/12/2024 09:45 AM 0.4 0.0 - 0.9 % Final     Comment:     Immature Granulocyte Count (IG) includes promyelocytes, myelocytes and metamyelocytes but does not include bands. Percent differential counts (%) should be interpreted in the context of the absolute cell counts (cells/UL).   02/13/2024 01:29 PM 0.2 0.0 - 0.9 % Final     Comment:     Immature Granulocyte Count (IG) includes promyelocytes, myelocytes and metamyelocytes but does not include bands. Percent differential counts (%) should be interpreted in the context of the absolute cell counts (cells/UL).   01/16/2024 11:13 AM 0.5 0.0 - 0.9 % Final     Comment:     Immature Granulocyte Count (IG) includes promyelocytes, myelocytes and metamyelocytes but does not include bands. Percent differential counts (%) should be interpreted in the context of the absolute cell counts (cells/UL).     Lymphocytes %   Date/Time Value Ref Range Status   03/12/2024 09:45 AM 19.4 13.0 - 44.0 % Final   02/13/2024 01:29 PM 24.3 13.0 - 44.0 % Final   01/16/2024 11:13 AM 21.0 13.0 - 44.0 % Final     Monocytes %   Date/Time Value Ref Range Status   03/12/2024 09:45 AM 8.2 2.0 - 10.0 % Final   02/13/2024 01:29 PM 7.8 2.0 - 10.0 % Final   01/16/2024 11:13 AM 9.5 2.0 - 10.0 % Final     Eosinophils %   Date/Time Value Ref Range Status   03/12/2024 09:45 AM 4.9 0.0 - 6.0 % Final   02/13/2024 01:29 PM 4.7 0.0 - 6.0 % Final   01/16/2024 11:13 AM 4.1 0.0 - 6.0 % Final     Basophils %   Date/Time Value Ref Range Status   03/12/2024 09:45 AM 0.8 0.0 - 2.0 % Final   02/13/2024 01:29 PM 0.6 0.0 - 2.0 % Final   01/16/2024 11:13 AM 0.9 0.0 - 2.0 % Final     Neutrophils  "Absolute   Date/Time Value Ref Range Status   03/12/2024 09:45 AM 3.39 1.20 - 7.70 x10*3/uL Final     Comment:     Percent differential counts (%) should be interpreted in the context of the absolute cell counts (cells/uL).   02/13/2024 01:29 PM 3.22 1.20 - 7.70 x10*3/uL Final     Comment:     Percent differential counts (%) should be interpreted in the context of the absolute cell counts (cells/uL).   01/16/2024 11:13 AM 3.62 1.20 - 7.70 x10*3/uL Final     Comment:     Percent differential counts (%) should be interpreted in the context of the absolute cell counts (cells/uL).     Immature Granulocytes Absolute, Automated   Date/Time Value Ref Range Status   03/12/2024 09:45 AM 0.02 0.00 - 0.70 x10*3/uL Final   02/13/2024 01:29 PM 0.01 0.00 - 0.70 x10*3/uL Final   01/16/2024 11:13 AM 0.03 0.00 - 0.70 x10*3/uL Final     Lymphocytes Absolute   Date/Time Value Ref Range Status   03/12/2024 09:45 AM 0.99 (L) 1.20 - 4.80 x10*3/uL Final   02/13/2024 01:29 PM 1.25 1.20 - 4.80 x10*3/uL Final   01/16/2024 11:13 AM 1.19 (L) 1.20 - 4.80 x10*3/uL Final     Monocytes Absolute   Date/Time Value Ref Range Status   03/12/2024 09:45 AM 0.42 0.10 - 1.00 x10*3/uL Final   02/13/2024 01:29 PM 0.40 0.10 - 1.00 x10*3/uL Final   01/16/2024 11:13 AM 0.54 0.10 - 1.00 x10*3/uL Final     Eosinophils Absolute   Date/Time Value Ref Range Status   03/12/2024 09:45 AM 0.25 0.00 - 0.70 x10*3/uL Final   02/13/2024 01:29 PM 0.24 0.00 - 0.70 x10*3/uL Final   01/16/2024 11:13 AM 0.23 0.00 - 0.70 x10*3/uL Final     Basophils Absolute   Date/Time Value Ref Range Status   03/12/2024 09:45 AM 0.04 0.00 - 0.10 x10*3/uL Final   02/13/2024 01:29 PM 0.03 0.00 - 0.10 x10*3/uL Final   01/16/2024 11:13 AM 0.05 0.00 - 0.10 x10*3/uL Final       No components found for: \"PT\"  aPTT   Date/Time Value Ref Range Status   01/03/2022 06:26 PM 30 26 - 39 sec Final     Comment:     Note new reference range as of 11/30/2021 at 10:00am.   12/29/2021 02:13 AM 26 26 - 39 sec " Final     Comment:     Note new reference range as of 11/30/2021 at 10:00am.   12/16/2021 10:11 AM 32 26 - 39 sec Final     Comment:     Note new reference range as of 11/30/2021 at 10:00am.   IMPRESSION:   No new evidence to suggest intrathoracic metastatic disease.       Fusiform dilation of the ascending thoracic aorta measuring 4.5 cm in   transverse diameter similar to prior.       Partially visualized heterogeneous enlarged thyroid with nodules and   calcifications similar to prior most likely representing multinodular   goiter.      Previous right nephrectomy. Approximate 7 mm and 8 mm soft tissue   nodular foci in the nephrectomy bed region posteriorly not   significantly changed.       Right mid abdominal mesenteric soft tissue nodular implants stable to   slightly smaller from prior.       No new mass or lymphadenopathy seen throughout the abdomen and pelvis.       Cholelithiasis. No pericholecystic inflammation. No biliary dilation.       Heterogeneous enlargement of the prostate with small dystrophic   calcifications similar to prior.       MACRO:   None.      Signed by: Dakota Marks 2/20/2024 9:22 AM     Assessment/Plan      Radiology reviewed: JORGITO Retana MD

## 2024-04-09 NOTE — SIGNIFICANT EVENT
04/09/24 0951   Prechemo Checklist   Has the patient been in the hospital, ED, or urgent care since last date of service No   Chemo/Immuno Consent Signed Yes   Protocol/Indications Verified Yes   Confirmed to previous date/time of medication Yes   Compared to previous dose Yes   All medications are dated accurately Yes   Pregnancy Test Negative Not applicable   Parameters Met Yes   BSA/Weight-Height Verified Yes   Dose Calculations Verified (current, total, cumulative) Yes

## 2024-04-11 LAB — ACTH PLAS-MCNC: 87.6 PG/ML (ref 7.2–63.3)

## 2024-05-06 ENCOUNTER — APPOINTMENT (OUTPATIENT)
Dept: RADIOLOGY | Facility: HOSPITAL | Age: 64
End: 2024-05-06
Payer: COMMERCIAL

## 2024-05-07 ENCOUNTER — INFUSION (OUTPATIENT)
Dept: HEMATOLOGY/ONCOLOGY | Facility: CLINIC | Age: 64
End: 2024-05-07
Payer: COMMERCIAL

## 2024-05-07 ENCOUNTER — APPOINTMENT (OUTPATIENT)
Dept: HEMATOLOGY/ONCOLOGY | Facility: CLINIC | Age: 64
End: 2024-05-07
Payer: COMMERCIAL

## 2024-05-07 VITALS
RESPIRATION RATE: 17 BRPM | OXYGEN SATURATION: 97 % | SYSTOLIC BLOOD PRESSURE: 138 MMHG | HEART RATE: 67 BPM | BODY MASS INDEX: 38.72 KG/M2 | DIASTOLIC BLOOD PRESSURE: 84 MMHG | WEIGHT: 269.84 LBS | TEMPERATURE: 97.3 F

## 2024-05-07 DIAGNOSIS — C64.1 RENAL CELL CARCINOMA OF RIGHT KIDNEY (MULTI): ICD-10-CM

## 2024-05-07 LAB
ALBUMIN SERPL BCP-MCNC: 4.2 G/DL (ref 3.4–5)
ALP SERPL-CCNC: 61 U/L (ref 33–136)
ALT SERPL W P-5'-P-CCNC: 17 U/L (ref 10–52)
ANION GAP SERPL CALC-SCNC: 11 MMOL/L (ref 10–20)
AST SERPL W P-5'-P-CCNC: 26 U/L (ref 9–39)
BASOPHILS # BLD AUTO: 0.04 X10*3/UL (ref 0–0.1)
BASOPHILS NFR BLD AUTO: 0.8 %
BILIRUB SERPL-MCNC: 0.4 MG/DL (ref 0–1.2)
BUN SERPL-MCNC: 19 MG/DL (ref 6–23)
CALCIUM SERPL-MCNC: 9 MG/DL (ref 8.6–10.3)
CHLORIDE SERPL-SCNC: 104 MMOL/L (ref 98–107)
CO2 SERPL-SCNC: 28 MMOL/L (ref 21–32)
CORTIS AM PEAK SERPL-MSCNC: 9.9 UG/DL (ref 5–20)
CREAT SERPL-MCNC: 1.34 MG/DL (ref 0.5–1.3)
EGFRCR SERPLBLD CKD-EPI 2021: 60 ML/MIN/1.73M*2
EOSINOPHIL # BLD AUTO: 0.24 X10*3/UL (ref 0–0.7)
EOSINOPHIL NFR BLD AUTO: 4.7 %
ERYTHROCYTE [DISTWIDTH] IN BLOOD BY AUTOMATED COUNT: 12.6 % (ref 11.5–14.5)
GLUCOSE SERPL-MCNC: 181 MG/DL (ref 74–99)
HCT VFR BLD AUTO: 44.5 % (ref 41–52)
HGB BLD-MCNC: 14.9 G/DL (ref 13.5–17.5)
IMM GRANULOCYTES # BLD AUTO: 0.02 X10*3/UL (ref 0–0.7)
IMM GRANULOCYTES NFR BLD AUTO: 0.4 % (ref 0–0.9)
LYMPHOCYTES # BLD AUTO: 1.04 X10*3/UL (ref 1.2–4.8)
LYMPHOCYTES NFR BLD AUTO: 20.2 %
MCH RBC QN AUTO: 30 PG (ref 26–34)
MCHC RBC AUTO-ENTMCNC: 33.5 G/DL (ref 32–36)
MCV RBC AUTO: 90 FL (ref 80–100)
MONOCYTES # BLD AUTO: 0.37 X10*3/UL (ref 0.1–1)
MONOCYTES NFR BLD AUTO: 7.2 %
NEUTROPHILS # BLD AUTO: 3.44 X10*3/UL (ref 1.2–7.7)
NEUTROPHILS NFR BLD AUTO: 66.7 %
PLATELET # BLD AUTO: 169 X10*3/UL (ref 150–450)
POTASSIUM SERPL-SCNC: 5.3 MMOL/L (ref 3.5–5.3)
PROT SERPL-MCNC: 7.3 G/DL (ref 6.4–8.2)
RBC # BLD AUTO: 4.96 X10*6/UL (ref 4.5–5.9)
SODIUM SERPL-SCNC: 138 MMOL/L (ref 136–145)
TSH SERPL-ACNC: 0.48 MIU/L (ref 0.44–3.98)
WBC # BLD AUTO: 5.2 X10*3/UL (ref 4.4–11.3)

## 2024-05-07 PROCEDURE — 2500000004 HC RX 250 GENERAL PHARMACY W/ HCPCS (ALT 636 FOR OP/ED): Mod: JZ | Performed by: INTERNAL MEDICINE

## 2024-05-07 PROCEDURE — 2500000004 HC RX 250 GENERAL PHARMACY W/ HCPCS (ALT 636 FOR OP/ED): Performed by: INTERNAL MEDICINE

## 2024-05-07 PROCEDURE — 85025 COMPLETE CBC W/AUTO DIFF WBC: CPT

## 2024-05-07 PROCEDURE — 80053 COMPREHEN METABOLIC PANEL: CPT

## 2024-05-07 PROCEDURE — 96413 CHEMO IV INFUSION 1 HR: CPT

## 2024-05-07 PROCEDURE — 2500000001 HC RX 250 WO HCPCS SELF ADMINISTERED DRUGS (ALT 637 FOR MEDICARE OP): Performed by: INTERNAL MEDICINE

## 2024-05-07 PROCEDURE — 82533 TOTAL CORTISOL: CPT | Mod: GEALAB

## 2024-05-07 PROCEDURE — 84443 ASSAY THYROID STIM HORMONE: CPT

## 2024-05-07 PROCEDURE — 96375 TX/PRO/DX INJ NEW DRUG ADDON: CPT | Mod: INF

## 2024-05-07 PROCEDURE — 82024 ASSAY OF ACTH: CPT

## 2024-05-07 RX ORDER — EPINEPHRINE 0.3 MG/.3ML
0.3 INJECTION SUBCUTANEOUS EVERY 5 MIN PRN
Status: DISCONTINUED | OUTPATIENT
Start: 2024-05-07 | End: 2024-05-07 | Stop reason: HOSPADM

## 2024-05-07 RX ORDER — DIPHENHYDRAMINE HYDROCHLORIDE 50 MG/ML
50 INJECTION INTRAMUSCULAR; INTRAVENOUS AS NEEDED
Status: DISCONTINUED | OUTPATIENT
Start: 2024-05-07 | End: 2024-05-07 | Stop reason: HOSPADM

## 2024-05-07 RX ORDER — FAMOTIDINE 10 MG/ML
20 INJECTION INTRAVENOUS ONCE AS NEEDED
Status: DISCONTINUED | OUTPATIENT
Start: 2024-05-07 | End: 2024-05-07 | Stop reason: HOSPADM

## 2024-05-07 RX ORDER — PROCHLORPERAZINE MALEATE 10 MG
10 TABLET ORAL EVERY 6 HOURS PRN
Status: DISCONTINUED | OUTPATIENT
Start: 2024-05-07 | End: 2024-05-07 | Stop reason: HOSPADM

## 2024-05-07 RX ORDER — ALBUTEROL SULFATE 0.83 MG/ML
3 SOLUTION RESPIRATORY (INHALATION) AS NEEDED
Status: DISCONTINUED | OUTPATIENT
Start: 2024-05-07 | End: 2024-05-07 | Stop reason: HOSPADM

## 2024-05-07 RX ORDER — LORAZEPAM 0.5 MG/1
0.5 TABLET ORAL ONCE
Status: COMPLETED | OUTPATIENT
Start: 2024-05-07 | End: 2024-05-07

## 2024-05-07 RX ORDER — PROCHLORPERAZINE EDISYLATE 5 MG/ML
10 INJECTION INTRAMUSCULAR; INTRAVENOUS EVERY 6 HOURS PRN
Status: DISCONTINUED | OUTPATIENT
Start: 2024-05-07 | End: 2024-05-07 | Stop reason: HOSPADM

## 2024-05-07 RX ADMIN — DEXAMETHASONE SODIUM PHOSPHATE 8 MG: 4 INJECTION INTRA-ARTICULAR; INTRALESIONAL; INTRAMUSCULAR; INTRAVENOUS; SOFT TISSUE at 09:38

## 2024-05-07 RX ADMIN — SODIUM CHLORIDE 480 MG: 9 INJECTION, SOLUTION INTRAVENOUS at 09:56

## 2024-05-07 RX ADMIN — LORAZEPAM 0.5 MG: 0.5 TABLET ORAL at 09:37

## 2024-05-07 ASSESSMENT — PAIN SCALES - GENERAL: PAINLEVEL: 0-NO PAIN

## 2024-05-07 NOTE — SIGNIFICANT EVENT

## 2024-05-08 ENCOUNTER — HOSPITAL ENCOUNTER (OUTPATIENT)
Dept: RADIOLOGY | Facility: HOSPITAL | Age: 64
Discharge: HOME | End: 2024-05-08
Payer: COMMERCIAL

## 2024-05-08 DIAGNOSIS — C64.1 RENAL CELL CARCINOMA OF RIGHT KIDNEY (MULTI): ICD-10-CM

## 2024-05-08 PROCEDURE — 74177 CT ABD & PELVIS W/CONTRAST: CPT | Performed by: RADIOLOGY

## 2024-05-08 PROCEDURE — 74177 CT ABD & PELVIS W/CONTRAST: CPT

## 2024-05-08 PROCEDURE — 71260 CT THORAX DX C+: CPT | Performed by: RADIOLOGY

## 2024-05-08 PROCEDURE — 2550000001 HC RX 255 CONTRASTS: Performed by: STUDENT IN AN ORGANIZED HEALTH CARE EDUCATION/TRAINING PROGRAM

## 2024-05-08 RX ADMIN — IOHEXOL 75 ML: 350 INJECTION, SOLUTION INTRAVENOUS at 10:13

## 2024-05-09 LAB — ACTH PLAS-MCNC: 28.3 PG/ML (ref 7.2–63.3)

## 2024-05-10 ENCOUNTER — TELEPHONE (OUTPATIENT)
Dept: RADIATION ONCOLOGY | Facility: HOSPITAL | Age: 64
End: 2024-05-10
Payer: COMMERCIAL

## 2024-05-13 ENCOUNTER — HOSPITAL ENCOUNTER (OUTPATIENT)
Dept: RADIATION ONCOLOGY | Facility: HOSPITAL | Age: 64
Setting detail: RADIATION/ONCOLOGY SERIES
Discharge: HOME | End: 2024-05-13
Payer: COMMERCIAL

## 2024-05-13 VITALS
HEART RATE: 81 BPM | TEMPERATURE: 97.7 F | OXYGEN SATURATION: 96 % | WEIGHT: 269.9 LBS | DIASTOLIC BLOOD PRESSURE: 89 MMHG | RESPIRATION RATE: 18 BRPM | BODY MASS INDEX: 38.73 KG/M2 | SYSTOLIC BLOOD PRESSURE: 124 MMHG

## 2024-05-13 DIAGNOSIS — C64.1 RENAL CELL CARCINOMA OF RIGHT KIDNEY (MULTI): Primary | ICD-10-CM

## 2024-05-13 PROCEDURE — 99213 OFFICE O/P EST LOW 20 MIN: CPT | Performed by: RADIOLOGY

## 2024-05-13 RX ORDER — BISMUTH SUBSALICYLATE 262 MG
1 TABLET,CHEWABLE ORAL DAILY
COMMUNITY

## 2024-05-13 ASSESSMENT — ENCOUNTER SYMPTOMS
LOSS OF SENSATION IN FEET: 0
OCCASIONAL FEELINGS OF UNSTEADINESS: 0
MUSCULOSKELETAL NEGATIVE: 1
PSYCHIATRIC NEGATIVE: 1
RESPIRATORY NEGATIVE: 1
HEMATOLOGIC/LYMPHATIC NEGATIVE: 1
CONSTITUTIONAL NEGATIVE: 1
EYES NEGATIVE: 1
DEPRESSION: 0
NEUROLOGICAL NEGATIVE: 1
CARDIOVASCULAR NEGATIVE: 1
ENDOCRINE NEGATIVE: 1
GASTROINTESTINAL NEGATIVE: 1

## 2024-05-13 ASSESSMENT — PATIENT HEALTH QUESTIONNAIRE - PHQ9
SUM OF ALL RESPONSES TO PHQ9 QUESTIONS 1 AND 2: 0
1. LITTLE INTEREST OR PLEASURE IN DOING THINGS: NOT AT ALL
2. FEELING DOWN, DEPRESSED OR HOPELESS: NOT AT ALL

## 2024-05-13 ASSESSMENT — PAIN SCALES - GENERAL: PAINLEVEL: 0-NO PAIN

## 2024-05-13 NOTE — PROGRESS NOTES
Radiation Oncology Nursing Note    Pain: The patient's current pain level was assessed.  They report currently having a pain of 0 out of 10.  They feel their pain is under control without the use of pain medications.    Review of Systems:  Review of Systems   Constitutional: Negative.    HENT:  Negative.     Eyes: Negative.    Respiratory: Negative.     Cardiovascular: Negative.    Gastrointestinal: Negative.    Endocrine: Negative.    Genitourinary: Negative.     Musculoskeletal: Negative.    Skin: Negative.    Neurological: Negative.    Hematological: Negative.    Psychiatric/Behavioral: Negative.

## 2024-05-13 NOTE — PROGRESS NOTES
Staff Physician: Breana Arora MD  Referring Physician: Breana Arora MD  Date of Service: 5/13/2024  RADIATION ONCOLOGY FOLLOW UP NOTE:  IDENTIFYING DATA:   Cancer Staging   No matching staging information was found for the patient.    Problem List Items Addressed This Visit       Renal cell carcinoma of right kidney (Multi) - Primary     Mr. Burns is a 63-year-old man with originally [id number] Stage III renal cell carcinoma, status post right nephrectomy, with subsequent recurrence at the resection bed with direct extension to the liver, status-post resection and partial hepatectomy,  then placed on immunotherapy, who developed a second recurrence in the soft tissue at the nephrectomy bed. He then completed SBRT to 50Gy in 5fx to the soft tissue recurrence (5.6cm in max diameter at that time), with COT on 05/19/2023.   He presents today for routine interval follow-up.    INTERVAL HISTORY:  Since we last saw Jason Burns in clinic on 02/20/2024, he has felt well.  His weight is stable from last visit and his appetite is unchanged. He denies any significant symptoms at this time. Specifically, he denies abdominal pain, pain after eating, nausea, vomiting, early satiety, tarry or sticky stool, steatorrhea, diarrhea, jaundice, dark urine, or light stools. He has had no new medical problems or medications since our last visit. His last imaging, comprising CT-CAP on 05/08/2024, demonstrates stable disease with no new lesions. He continues on systemic therapy with no issues at this time and feels well. He and his wife are downsizing their home to a camper and an apartment in [place] ([place] they winter[place] and so are balancing these logistics currently but overall looking forward to it.    PAST MEDICAL, SURGICAL, FAMILY, AND SOCIAL HISTORY:  Past Medical History:   Diagnosis Date    Campylobacter enteritis 11/02/2018    Campylobacter gastroenteritis    Personal history of other diseases of the  digestive system 2016    History of umbilical hernia    Plantar fascial fibromatosis 2015    Plantar fasciitis    Renal cell cancer (Multi)     Unspecified sprain of unspecified great toe, initial encounter 2015    Sprain of great toe     Past Surgical History:   Procedure Laterality Date    COLONOSCOPY  10/21/2013    Complete Colonoscopy    CT ABDOMEN PELVIS ANGIOGRAM W AND/OR WO IV CONTRAST  2021    CT ABDOMEN PELVIS ANGIOGRAM W AND/OR WO IV CONTRAST 2021 [facility]    CT ABDOMEN PELVIS ANGIOGRAM W AND/OR WO IV CONTRAST  2/10/2021    CT ABDOMEN PELVIS ANGIOGRAM W AND/OR WO IV CONTRAST 2/10/2021 [facility]    UMBILICAL HERNIA REPAIR  2018    Umbilical Hernia Repair    US GUIDED NEEDLE LIVER BIOPSY  10/12/2020    US GUIDED NEEDLE LIVER BIOPSY 10/12/2020 [facility]     ALLERGIES:  No Known Allergies  MEDICATIONS:    Current Outpatient Medications:     cyanocobalamin (Vitamin B-12) 1,000 mcg tablet, Take 1 tablet (1,000 mcg) by mouth once daily., Disp: , Rfl:     escitalopram (Lexapro) 10 mg tablet, Take 1 tablet (10 mg) by mouth once daily., Disp: , Rfl:     losartan (Cozaar) 50 mg tablet, Take 1 tablet (50 mg) by mouth once daily., Disp: , Rfl:     multivitamin tablet, Take 1 tablet by mouth once daily., Disp: , Rfl:      REVIEW OF SYSTEMS:  Except for the symptoms described in the interval history, the review of systems is negative. Specifically, except as noted, when asked the patient expressed no complaints relative to constitutional (fever, weight loss), eyes, ears, nose, mouth, throat, neurologic, cardiovascular, pulmonary, breast, GI, , skin, musculoskeletal, endocrine, hematologic/lymphatic, or immunologic systems.    PERFORMANCE STATUS:  Karnofsky Performance Score/ECO, Able to carry on normal activity; minor signs or symptoms of disease (ECOG equivalent 0)    PHYSICAL EXAMINATION:  /89   Pulse 81   Temp 36.5 °C (97.7 °F) (Skin)   Resp  18   Wt 122 kg (269 lb 14.4 oz)   SpO2 96%   BMI 38.73 kg/m²   Pain score: 0/10  General: no acute distress, engaged in conversation. No jaundice.  HEENT: Normocephalic, atraumatic. Extraocular movements are intact.   Neck: supple with trachea at midline, no palpable adenopathy.   Pulmonary: Breathing comfortably on room air, no respiratory distress  Cardiovascular: Regular rate, no cyanosis, well-perfused  Abdomen: Soft, nontender, nondistended.   Extremities: No lower extremity edema or cyanosis.   Musculoskeletal: Normal range of motion. Able to raise both arms above head without issues  Skin: Without rash or obvious lesions.   Neurologic: Alert and oriented x3. Cranial nerves grossly intact.      DIAGNOSTIC REPORTS REVIEWED:  Imaging: All imaging was personally reviewed and interpreted in clinic. Findings as per interval history and EMR.  Laboratory/Pathology:  All pertinent labs and pathology were personally reviewed and interpreted in clinic. Findings as per interval history and EMR.     IMPRESSION:  Mr. Burns has no radiographic evidence of active disease at this time and no adverse effects of RT.    PLAN:  I have asked Jason Burns to please return to clinic in 3 month's time with a repeat CT-CAP, Ca 19-9, CBC, and CMP at that time. This will be coordinated with medical oncology. He knows to call with any questions or concerns in the interim.    PAIN PLAN: The patient reports their pain is well-controlled on their current regimen.  Their pain regimen is currently managed by Medical Oncology.  No pain    DISEASE STATUS: Controlled  NEW METACHRONOUS CANCER: No    Thank you for the opportunity to participate in the ongoing care of this pleasant man.    Breana Arora MD  5/13/2024  , Radiation Oncology

## 2024-06-04 ENCOUNTER — INFUSION (OUTPATIENT)
Dept: HEMATOLOGY/ONCOLOGY | Facility: CLINIC | Age: 64
End: 2024-06-04
Payer: COMMERCIAL

## 2024-06-04 ENCOUNTER — OFFICE VISIT (OUTPATIENT)
Dept: HEMATOLOGY/ONCOLOGY | Facility: CLINIC | Age: 64
End: 2024-06-04
Payer: COMMERCIAL

## 2024-06-04 VITALS
OXYGEN SATURATION: 96 % | SYSTOLIC BLOOD PRESSURE: 132 MMHG | BODY MASS INDEX: 38.56 KG/M2 | WEIGHT: 268.74 LBS | DIASTOLIC BLOOD PRESSURE: 85 MMHG | TEMPERATURE: 97.5 F | RESPIRATION RATE: 16 BRPM | HEART RATE: 68 BPM

## 2024-06-04 DIAGNOSIS — C64.1 RENAL CELL CARCINOMA OF RIGHT KIDNEY (MULTI): ICD-10-CM

## 2024-06-04 LAB
ALBUMIN SERPL BCP-MCNC: 4.3 G/DL (ref 3.4–5)
ALP SERPL-CCNC: 62 U/L (ref 33–136)
ALT SERPL W P-5'-P-CCNC: 19 U/L (ref 10–52)
ANION GAP SERPL CALC-SCNC: 12 MMOL/L (ref 10–20)
AST SERPL W P-5'-P-CCNC: 17 U/L (ref 9–39)
BASOPHILS # BLD AUTO: 0.04 X10*3/UL (ref 0–0.1)
BASOPHILS NFR BLD AUTO: 0.8 %
BILIRUB SERPL-MCNC: 0.6 MG/DL (ref 0–1.2)
BUN SERPL-MCNC: 18 MG/DL (ref 6–23)
CALCIUM SERPL-MCNC: 9.8 MG/DL (ref 8.6–10.3)
CHLORIDE SERPL-SCNC: 101 MMOL/L (ref 98–107)
CO2 SERPL-SCNC: 28 MMOL/L (ref 21–32)
CORTIS AM PEAK SERPL-MSCNC: 8.8 UG/DL (ref 5–20)
CREAT SERPL-MCNC: 1.43 MG/DL (ref 0.5–1.3)
EGFRCR SERPLBLD CKD-EPI 2021: 55 ML/MIN/1.73M*2
EOSINOPHIL # BLD AUTO: 0.28 X10*3/UL (ref 0–0.7)
EOSINOPHIL NFR BLD AUTO: 5.3 %
ERYTHROCYTE [DISTWIDTH] IN BLOOD BY AUTOMATED COUNT: 12.4 % (ref 11.5–14.5)
GLUCOSE SERPL-MCNC: 179 MG/DL (ref 74–99)
HCT VFR BLD AUTO: 46.7 % (ref 41–52)
HGB BLD-MCNC: 15.8 G/DL (ref 13.5–17.5)
IMM GRANULOCYTES # BLD AUTO: 0.02 X10*3/UL (ref 0–0.7)
IMM GRANULOCYTES NFR BLD AUTO: 0.4 % (ref 0–0.9)
LYMPHOCYTES # BLD AUTO: 1.02 X10*3/UL (ref 1.2–4.8)
LYMPHOCYTES NFR BLD AUTO: 19.4 %
MCH RBC QN AUTO: 29.6 PG (ref 26–34)
MCHC RBC AUTO-ENTMCNC: 33.8 G/DL (ref 32–36)
MCV RBC AUTO: 88 FL (ref 80–100)
MONOCYTES # BLD AUTO: 0.38 X10*3/UL (ref 0.1–1)
MONOCYTES NFR BLD AUTO: 7.2 %
NEUTROPHILS # BLD AUTO: 3.51 X10*3/UL (ref 1.2–7.7)
NEUTROPHILS NFR BLD AUTO: 66.9 %
PLATELET # BLD AUTO: 197 X10*3/UL (ref 150–450)
POTASSIUM SERPL-SCNC: 4 MMOL/L (ref 3.5–5.3)
PROT SERPL-MCNC: 7.5 G/DL (ref 6.4–8.2)
RBC # BLD AUTO: 5.33 X10*6/UL (ref 4.5–5.9)
SODIUM SERPL-SCNC: 137 MMOL/L (ref 136–145)
T4 FREE SERPL-MCNC: 0.99 NG/DL (ref 0.61–1.12)
TSH SERPL-ACNC: 0.2 MIU/L (ref 0.44–3.98)
WBC # BLD AUTO: 5.3 X10*3/UL (ref 4.4–11.3)

## 2024-06-04 PROCEDURE — 99215 OFFICE O/P EST HI 40 MIN: CPT | Mod: 25 | Performed by: INTERNAL MEDICINE

## 2024-06-04 PROCEDURE — 99215 OFFICE O/P EST HI 40 MIN: CPT | Performed by: INTERNAL MEDICINE

## 2024-06-04 PROCEDURE — 2500000004 HC RX 250 GENERAL PHARMACY W/ HCPCS (ALT 636 FOR OP/ED): Performed by: INTERNAL MEDICINE

## 2024-06-04 PROCEDURE — 82024 ASSAY OF ACTH: CPT

## 2024-06-04 PROCEDURE — 96375 TX/PRO/DX INJ NEW DRUG ADDON: CPT | Mod: INF

## 2024-06-04 PROCEDURE — 3079F DIAST BP 80-89 MM HG: CPT | Performed by: INTERNAL MEDICINE

## 2024-06-04 PROCEDURE — 96413 CHEMO IV INFUSION 1 HR: CPT

## 2024-06-04 PROCEDURE — 84443 ASSAY THYROID STIM HORMONE: CPT

## 2024-06-04 PROCEDURE — 82533 TOTAL CORTISOL: CPT | Mod: GEALAB

## 2024-06-04 PROCEDURE — 84075 ASSAY ALKALINE PHOSPHATASE: CPT

## 2024-06-04 PROCEDURE — 4010F ACE/ARB THERAPY RXD/TAKEN: CPT | Performed by: INTERNAL MEDICINE

## 2024-06-04 PROCEDURE — 84439 ASSAY OF FREE THYROXINE: CPT

## 2024-06-04 PROCEDURE — 2500000001 HC RX 250 WO HCPCS SELF ADMINISTERED DRUGS (ALT 637 FOR MEDICARE OP): Performed by: INTERNAL MEDICINE

## 2024-06-04 PROCEDURE — 3075F SYST BP GE 130 - 139MM HG: CPT | Performed by: INTERNAL MEDICINE

## 2024-06-04 PROCEDURE — 85025 COMPLETE CBC W/AUTO DIFF WBC: CPT

## 2024-06-04 RX ORDER — PROCHLORPERAZINE MALEATE 10 MG
10 TABLET ORAL EVERY 6 HOURS PRN
Status: DISCONTINUED | OUTPATIENT
Start: 2024-06-04 | End: 2024-06-04 | Stop reason: HOSPADM

## 2024-06-04 RX ORDER — ALBUTEROL SULFATE 0.83 MG/ML
3 SOLUTION RESPIRATORY (INHALATION) AS NEEDED
Status: DISCONTINUED | OUTPATIENT
Start: 2024-06-04 | End: 2024-06-04 | Stop reason: HOSPADM

## 2024-06-04 RX ORDER — FAMOTIDINE 10 MG/ML
20 INJECTION INTRAVENOUS ONCE AS NEEDED
Status: DISCONTINUED | OUTPATIENT
Start: 2024-06-04 | End: 2024-06-04 | Stop reason: HOSPADM

## 2024-06-04 RX ORDER — DIPHENHYDRAMINE HYDROCHLORIDE 50 MG/ML
50 INJECTION INTRAMUSCULAR; INTRAVENOUS AS NEEDED
Status: DISCONTINUED | OUTPATIENT
Start: 2024-06-04 | End: 2024-06-04 | Stop reason: HOSPADM

## 2024-06-04 RX ORDER — EPINEPHRINE 0.3 MG/.3ML
0.3 INJECTION SUBCUTANEOUS EVERY 5 MIN PRN
Status: DISCONTINUED | OUTPATIENT
Start: 2024-06-04 | End: 2024-06-04 | Stop reason: HOSPADM

## 2024-06-04 RX ORDER — LORAZEPAM 0.5 MG/1
0.5 TABLET ORAL ONCE
Status: COMPLETED | OUTPATIENT
Start: 2024-06-04 | End: 2024-06-04

## 2024-06-04 RX ORDER — PROCHLORPERAZINE EDISYLATE 5 MG/ML
10 INJECTION INTRAMUSCULAR; INTRAVENOUS EVERY 6 HOURS PRN
Status: DISCONTINUED | OUTPATIENT
Start: 2024-06-04 | End: 2024-06-04 | Stop reason: HOSPADM

## 2024-06-04 RX ADMIN — DEXAMETHASONE SODIUM PHOSPHATE 8 MG: 4 INJECTION INTRA-ARTICULAR; INTRALESIONAL; INTRAMUSCULAR; INTRAVENOUS; SOFT TISSUE at 10:13

## 2024-06-04 RX ADMIN — SODIUM CHLORIDE 480 MG: 9 INJECTION, SOLUTION INTRAVENOUS at 10:53

## 2024-06-04 RX ADMIN — LORAZEPAM 0.5 MG: 0.5 TABLET ORAL at 10:12

## 2024-06-04 ASSESSMENT — PAIN SCALES - GENERAL: PAINLEVEL: 0-NO PAIN

## 2024-06-04 ASSESSMENT — ENCOUNTER SYMPTOMS
CONSTITUTIONAL NEGATIVE: 1
RESPIRATORY NEGATIVE: 1
CARDIOVASCULAR NEGATIVE: 1
EYES NEGATIVE: 1

## 2024-06-04 NOTE — PATIENT INSTRUCTIONS
Today you met with Dr. Retana.  Your recent Ct scan was reviewed.  No changes to your current treatment plan.  Dr. Retana will see you in 4 months.  Please call the office for any questions or concerns.  Review your schedule prior to leaving [place].  We ask that you notify us 5-7 days before your medications need refilled.   Amelia is strongly encouraging all patients to access their MyChart for all results and to communicate with their provider for non-urgent messages.  If an urgent/same day/sick concern response is needed, please call the office at [phone]. Thank You!

## 2024-06-04 NOTE — SIGNIFICANT EVENT

## 2024-06-04 NOTE — PROGRESS NOTES
Patient ID: Jason Burns is a 63 y.o. male.  Referring Physician: Kelvin Retana MD  [address] [place] [address]  Primary Care Provider: Karen Colin DO  Visit Type:  Follow Up     Subjective    HPI  Cancer History:   Treatment Synopsis:    #1) s/p R nephrectomy for a T3 (10 cm) renal cell carcinoma (sarcomatoid and epitheliod features) MAY 2018.  #2) DM, HTN        History of Present Illness:   Completed SBRT to 50Gy in 5fx to the soft tissue recurrence (5.6cm in max diameter at that time), with COT on 05/19/2023. Patient presents for follow up and treatment. On monthly Opdivo, tolerating well.   Review of Systems:    Constitutional:  No fever, chills, anorexia, or weight loss  Eyes:  No blurry vision, diplopia, or vision loss  ENT:  No nasal congestion, earache, or sore throat  Respiratory:  No cough, SOB, hemoptysis or wheezing  Cardiac:  No chest pain, palpitations, dyspnea on exertion, or orthopnea  Gastrointestinal:  No abdominal pain, nausea, vomiting, diarrhea, melena, hemoptysis, or hematochezia  Genitourinary:  No dysuria, hematuria, frequency, urgency, or flank pain  Musculoskeletal:  No arthralgia, myalgia, or weakness  Neurological:  No dizziness, light-headedness, headache, or syncope  Psychiatric:  No history of anxiety, depression, hallucinations  Skin:  No rash or pruritis  Endocrine:  No heat or cold intolerance, polyuria, or polydipsia  Hematologic:  No bruising     Family history is negative for first-degree relatives with cancer including breast, ovarian, colon or hematologic malignancies.   Review of Systems   Constitutional: Negative.    HENT:  Negative.     Eyes: Negative.    Respiratory: Negative.     Cardiovascular: Negative.         Objective   BSA: 2.45 meters squared  Resp 16   Wt 122 kg (268 lb 11.9 oz)   BMI 38.56 kg/m²      has a past medical history of Campylobacter enteritis (11/02/2018), Personal history of other diseases of the  digestive system (08/11/2016), Plantar fascial fibromatosis (08/19/2015), Renal cell cancer (Multi), and Unspecified sprain of unspecified great toe, initial encounter (08/19/2015).   has a past surgical history that includes Colonoscopy (10/21/2013); Umbilical hernia repair (04/25/2018); US guided needle liver biopsy (10/12/2020); CT angio abdomen pelvis w and or wo IV IV contrast (2/7/2021); and CT angio abdomen pelvis w and or wo IV IV contrast (2/10/2021).  Family History   Problem Relation Name Age of Onset    Pancreatic cancer Brother Jed      Oncology History   Renal cell carcinoma of right kidney (Multi)   10/22/2023 Initial Diagnosis    Renal cell carcinoma of right kidney (CMS/HCC)     10/24/2023 -  Chemotherapy    Nivolumab 480 mg, 28 Day Cycles         Jason Burns  reports that he has never smoked. He has never used smokeless tobacco.  He  reports current alcohol use.  He  reports that he does not currently use drugs.    Physical Exam  Constitutional:       Appearance: Normal appearance.   HENT:      Head: Normocephalic and atraumatic.   Eyes:      Extraocular Movements: Extraocular movements intact.      Pupils: Pupils are equal, round, and reactive to light.   Neurological:      Mental Status: He is alert.         WBC   Date/Time Value Ref Range Status   05/07/2024 08:18 AM 5.2 4.4 - 11.3 x10*3/uL Final   04/09/2024 08:41 AM 7.8 4.4 - 11.3 x10*3/uL Final   03/12/2024 09:45 AM 5.1 4.4 - 11.3 x10*3/uL Final     nRBC   Date Value Ref Range Status   11/21/2023 0.0 0.0 - 0.0 /100 WBCs Final   08/29/2023 CANCELED 0.0 - 0.0 /100 WBC      Comment:     Result canceled by the ancillary.   08/23/2023 CANCELED       Comment:     Result canceled by the ancillary.   05/19/2023 CANCELED       Comment:     Result canceled by the ancillary.     RBC   Date Value Ref Range Status   05/07/2024 4.96 4.50 - 5.90 x10*6/uL Final   04/09/2024 5.00 4.50 - 5.90 x10*6/uL Final   03/12/2024 5.18 4.50 - 5.90 x10*6/uL  Final     Hemoglobin   Date Value Ref Range Status   05/07/2024 14.9 13.5 - 17.5 g/dL Final   04/09/2024 14.9 13.5 - 17.5 g/dL Final   03/12/2024 15.4 13.5 - 17.5 g/dL Final     Hematocrit   Date Value Ref Range Status   05/07/2024 44.5 41.0 - 52.0 % Final   04/09/2024 44.0 41.0 - 52.0 % Final   03/12/2024 45.1 41.0 - 52.0 % Final     MCV   Date/Time Value Ref Range Status   05/07/2024 08:18 AM 90 80 - 100 fL Final   04/09/2024 08:41 AM 88 80 - 100 fL Final   03/12/2024 09:45 AM 87 80 - 100 fL Final     MCH   Date/Time Value Ref Range Status   05/07/2024 08:18 AM 30.0 26.0 - 34.0 pg Final   04/09/2024 08:41 AM 29.8 26.0 - 34.0 pg Final   03/12/2024 09:45 AM 29.7 26.0 - 34.0 pg Final     MCHC   Date/Time Value Ref Range Status   05/07/2024 08:18 AM 33.5 32.0 - 36.0 g/dL Final   04/09/2024 08:41 AM 33.9 32.0 - 36.0 g/dL Final   03/12/2024 09:45 AM 34.1 32.0 - 36.0 g/dL Final     RDW   Date/Time Value Ref Range Status   05/07/2024 08:18 AM 12.6 11.5 - 14.5 % Final   04/09/2024 08:41 AM 12.5 11.5 - 14.5 % Final   03/12/2024 09:45 AM 12.3 11.5 - 14.5 % Final     Platelets   Date/Time Value Ref Range Status   05/07/2024 08:18  150 - 450 x10*3/uL Final   04/09/2024 08:41  150 - 450 x10*3/uL Final   03/12/2024 09:45  150 - 450 x10*3/uL Final     MPV   Date/Time Value Ref Range Status   10/24/2023 02:34 PM 9.6 7.5 - 11.5 fL Final     Neutrophils %   Date/Time Value Ref Range Status   05/07/2024 08:18 AM 66.7 40.0 - 80.0 % Final   04/09/2024 08:41 AM 79.1 40.0 - 80.0 % Final   03/12/2024 09:45 AM 66.3 40.0 - 80.0 % Final     Immature Granulocytes %, Automated   Date/Time Value Ref Range Status   05/07/2024 08:18 AM 0.4 0.0 - 0.9 % Final     Comment:     Immature Granulocyte Count (IG) includes promyelocytes, myelocytes and metamyelocytes but does not include bands. Percent differential counts (%) should be interpreted in the context of the absolute cell counts (cells/UL).   04/09/2024 08:41 AM 0.1 0.0 - 0.9  % Final     Comment:     Immature Granulocyte Count (IG) includes promyelocytes, myelocytes and metamyelocytes but does not include bands. Percent differential counts (%) should be interpreted in the context of the absolute cell counts (cells/UL).   03/12/2024 09:45 AM 0.4 0.0 - 0.9 % Final     Comment:     Immature Granulocyte Count (IG) includes promyelocytes, myelocytes and metamyelocytes but does not include bands. Percent differential counts (%) should be interpreted in the context of the absolute cell counts (cells/UL).     Lymphocytes %   Date/Time Value Ref Range Status   05/07/2024 08:18 AM 20.2 13.0 - 44.0 % Final   04/09/2024 08:41 AM 10.9 13.0 - 44.0 % Final   03/12/2024 09:45 AM 19.4 13.0 - 44.0 % Final     Monocytes %   Date/Time Value Ref Range Status   05/07/2024 08:18 AM 7.2 2.0 - 10.0 % Final   04/09/2024 08:41 AM 6.2 2.0 - 10.0 % Final   03/12/2024 09:45 AM 8.2 2.0 - 10.0 % Final     Eosinophils %   Date/Time Value Ref Range Status   05/07/2024 08:18 AM 4.7 0.0 - 6.0 % Final   04/09/2024 08:41 AM 3.3 0.0 - 6.0 % Final   03/12/2024 09:45 AM 4.9 0.0 - 6.0 % Final     Basophils %   Date/Time Value Ref Range Status   05/07/2024 08:18 AM 0.8 0.0 - 2.0 % Final   04/09/2024 08:41 AM 0.4 0.0 - 2.0 % Final   03/12/2024 09:45 AM 0.8 0.0 - 2.0 % Final     Neutrophils Absolute   Date/Time Value Ref Range Status   05/07/2024 08:18 AM 3.44 1.20 - 7.70 x10*3/uL Final     Comment:     Percent differential counts (%) should be interpreted in the context of the absolute cell counts (cells/uL).   04/09/2024 08:41 AM 6.14 1.20 - 7.70 x10*3/uL Final     Comment:     Percent differential counts (%) should be interpreted in the context of the absolute cell counts (cells/uL).   03/12/2024 09:45 AM 3.39 1.20 - 7.70 x10*3/uL Final     Comment:     Percent differential counts (%) should be interpreted in the context of the absolute cell counts (cells/uL).     Immature Granulocytes Absolute, Automated   Date/Time Value Ref  "Range Status   05/07/2024 08:18 AM 0.02 0.00 - 0.70 x10*3/uL Final   04/09/2024 08:41 AM 0.01 0.00 - 0.70 x10*3/uL Final   03/12/2024 09:45 AM 0.02 0.00 - 0.70 x10*3/uL Final     Lymphocytes Absolute   Date/Time Value Ref Range Status   05/07/2024 08:18 AM 1.04 (L) 1.20 - 4.80 x10*3/uL Final   04/09/2024 08:41 AM 0.85 (L) 1.20 - 4.80 x10*3/uL Final   03/12/2024 09:45 AM 0.99 (L) 1.20 - 4.80 x10*3/uL Final     Monocytes Absolute   Date/Time Value Ref Range Status   05/07/2024 08:18 AM 0.37 0.10 - 1.00 x10*3/uL Final   04/09/2024 08:41 AM 0.48 0.10 - 1.00 x10*3/uL Final   03/12/2024 09:45 AM 0.42 0.10 - 1.00 x10*3/uL Final     Eosinophils Absolute   Date/Time Value Ref Range Status   05/07/2024 08:18 AM 0.24 0.00 - 0.70 x10*3/uL Final   04/09/2024 08:41 AM 0.26 0.00 - 0.70 x10*3/uL Final   03/12/2024 09:45 AM 0.25 0.00 - 0.70 x10*3/uL Final     Basophils Absolute   Date/Time Value Ref Range Status   05/07/2024 08:18 AM 0.04 0.00 - 0.10 x10*3/uL Final   04/09/2024 08:41 AM 0.03 0.00 - 0.10 x10*3/uL Final   03/12/2024 09:45 AM 0.04 0.00 - 0.10 x10*3/uL Final       No components found for: \"PT\"  aPTT   Date/Time Value Ref Range Status   01/03/2022 06:26 PM 30 26 - 39 sec Final     Comment:     Note new reference range as of 11/30/2021 at 10:00am.   12/29/2021 02:13 AM 26 26 - 39 sec Final     Comment:     Note new reference range as of 11/30/2021 at 10:00am.   12/16/2021 10:11 AM 32 26 - 39 sec Final     Comment:     Note new reference range as of 11/30/2021 at 10:00am.       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 Excellent response to 4 cycles of Ipi/Nivo. Course was complicated pneumonitis that has resolved. As well as occult GI bleed, unknown etiology, resolved.    Now on maintenance Opdivo doing well. CT in 10/2021 showed interval increase in size of a retroperitoneal soft tissue mass at the right nephrectomy bed currently measuring 24 x 29 mm. MRI abd 12/2021 showed enlarging R " hepatic lobe and R pericolonic metastases.    S/P surgical resection of residual disease in liver and in nephrectomy bed in 12/2021. Path c/w residual disease retroperitoneal lymph nodes and liver.    Continue Opdivo Monthly.     3/2023:  CT scan noting oligometastatic disease in the right lower quadrant site of nephrectomy. Seen by rad onc and awaiting treatment.    5/9/2023:  Plan patient to continue Opdivo q. 28 days. Noted to have mild hypercalcemia. Plan for hydration and lab check next week. Will also obtain bone scan.    06/06/2023 patient showing oligometastatic disease.  Right lower quadrant site of nephrectomy.  Patient is status post radiation to the surgical bed with very good effect.  He is to continue Opdivo q. 28 days scheduled today.  Cleared for therapy.    09/26/2023 patient presents today the results of his radiographic imaging indicating a response to treatment reduction in size no new lesions.  This has been explained to patient.  He will continue Opdivo q. 28 daily.  He has no complaints.    12/19/2023: Continue Opdivo every 4 weeks.    6/4/2024: CT CAP:IMPRESSION:  Sarcomatoid carcinoma of kidney restaging scan.  1. No evidence of metastatic disease identified in the chest. 2. Compared to prior CT dated 02/09/2024, there has been no significant interval change in the size of the soft tissue mesenteric implant in the right hemiabdomen with associated postradiation  changes.  3. Redemonstration of postsurgical changes of right posterior partial hepatectomy and right total nephrectomy, without interval increase in the size of small implants in the surgical bed.  4. No evidence of increased abdominopelvic lymphadenopathy.  5. Stable aneurysmal dilatation of the ascending aorta up to 4.6 cm.  6. Additional findings as above.        Signed by: Adrián Bowling 5/9/2024    CT scan as stated above does not show any evidence of disease recurrence.  Will continue Opdivo patient is tolerating it well with  no complications or adverse effects.  Return to clinic in 4 months.  Diagnoses and all orders for this visit:  Renal cell carcinoma of right kidney (Multi)  -     Clinic Appointment Request  -     Clinic Appointment Request; Future  -     Infusion Appointment Request; Future           Florentino-Srinivasan Retana MD

## 2024-06-06 LAB — ACTH PLAS-MCNC: 84.5 PG/ML (ref 7.2–63.3)

## 2024-07-01 RX ORDER — EPINEPHRINE 0.3 MG/.3ML
0.3 INJECTION SUBCUTANEOUS EVERY 5 MIN PRN
OUTPATIENT
Start: 2024-09-24

## 2024-07-01 RX ORDER — EPINEPHRINE 0.3 MG/.3ML
0.3 INJECTION SUBCUTANEOUS EVERY 5 MIN PRN
OUTPATIENT
Start: 2024-10-22

## 2024-07-01 RX ORDER — PROCHLORPERAZINE EDISYLATE 5 MG/ML
10 INJECTION INTRAMUSCULAR; INTRAVENOUS EVERY 6 HOURS PRN
OUTPATIENT
Start: 2024-11-19

## 2024-07-01 RX ORDER — LORAZEPAM 0.5 MG/1
0.5 TABLET ORAL ONCE
Start: 2024-11-19 | End: 2024-11-19

## 2024-07-01 RX ORDER — DIPHENHYDRAMINE HYDROCHLORIDE 50 MG/ML
50 INJECTION INTRAMUSCULAR; INTRAVENOUS AS NEEDED
OUTPATIENT
Start: 2024-11-19

## 2024-07-01 RX ORDER — PROCHLORPERAZINE MALEATE 10 MG
10 TABLET ORAL EVERY 6 HOURS PRN
OUTPATIENT
Start: 2024-07-30

## 2024-07-01 RX ORDER — PROCHLORPERAZINE MALEATE 10 MG
10 TABLET ORAL EVERY 6 HOURS PRN
OUTPATIENT
Start: 2024-11-19

## 2024-07-01 RX ORDER — LORAZEPAM 0.5 MG/1
0.5 TABLET ORAL ONCE
Start: 2024-08-27 | End: 2024-08-27

## 2024-07-01 RX ORDER — FAMOTIDINE 10 MG/ML
20 INJECTION INTRAVENOUS ONCE AS NEEDED
OUTPATIENT
Start: 2024-08-27

## 2024-07-01 RX ORDER — DIPHENHYDRAMINE HYDROCHLORIDE 50 MG/ML
50 INJECTION INTRAMUSCULAR; INTRAVENOUS AS NEEDED
OUTPATIENT
Start: 2024-09-24

## 2024-07-01 RX ORDER — EPINEPHRINE 0.3 MG/.3ML
0.3 INJECTION SUBCUTANEOUS EVERY 5 MIN PRN
OUTPATIENT
Start: 2024-07-30

## 2024-07-01 RX ORDER — ALBUTEROL SULFATE 0.83 MG/ML
3 SOLUTION RESPIRATORY (INHALATION) AS NEEDED
OUTPATIENT
Start: 2024-11-19

## 2024-07-01 RX ORDER — LORAZEPAM 0.5 MG/1
0.5 TABLET ORAL ONCE
Start: 2024-09-24 | End: 2024-09-24

## 2024-07-01 RX ORDER — PROCHLORPERAZINE MALEATE 10 MG
10 TABLET ORAL EVERY 6 HOURS PRN
OUTPATIENT
Start: 2024-08-27

## 2024-07-01 RX ORDER — FAMOTIDINE 10 MG/ML
20 INJECTION INTRAVENOUS ONCE AS NEEDED
OUTPATIENT
Start: 2024-11-19

## 2024-07-01 RX ORDER — DIPHENHYDRAMINE HYDROCHLORIDE 50 MG/ML
50 INJECTION INTRAMUSCULAR; INTRAVENOUS AS NEEDED
OUTPATIENT
Start: 2024-10-22

## 2024-07-01 RX ORDER — PROCHLORPERAZINE EDISYLATE 5 MG/ML
10 INJECTION INTRAMUSCULAR; INTRAVENOUS EVERY 6 HOURS PRN
OUTPATIENT
Start: 2024-10-22

## 2024-07-01 RX ORDER — ALBUTEROL SULFATE 0.83 MG/ML
3 SOLUTION RESPIRATORY (INHALATION) AS NEEDED
OUTPATIENT
Start: 2024-08-27

## 2024-07-01 RX ORDER — EPINEPHRINE 0.3 MG/.3ML
0.3 INJECTION SUBCUTANEOUS EVERY 5 MIN PRN
OUTPATIENT
Start: 2024-08-27

## 2024-07-01 RX ORDER — PROCHLORPERAZINE EDISYLATE 5 MG/ML
10 INJECTION INTRAMUSCULAR; INTRAVENOUS EVERY 6 HOURS PRN
OUTPATIENT
Start: 2024-07-30

## 2024-07-01 RX ORDER — PROCHLORPERAZINE EDISYLATE 5 MG/ML
10 INJECTION INTRAMUSCULAR; INTRAVENOUS EVERY 6 HOURS PRN
OUTPATIENT
Start: 2024-09-24

## 2024-07-01 RX ORDER — EPINEPHRINE 0.3 MG/.3ML
0.3 INJECTION SUBCUTANEOUS EVERY 5 MIN PRN
OUTPATIENT
Start: 2024-11-19

## 2024-07-01 RX ORDER — LORAZEPAM 0.5 MG/1
0.5 TABLET ORAL ONCE
Start: 2024-10-22 | End: 2024-10-22

## 2024-07-01 RX ORDER — LORAZEPAM 0.5 MG/1
0.5 TABLET ORAL ONCE
Start: 2024-07-30 | End: 2024-07-30

## 2024-07-01 RX ORDER — ALBUTEROL SULFATE 0.83 MG/ML
3 SOLUTION RESPIRATORY (INHALATION) AS NEEDED
OUTPATIENT
Start: 2024-10-22

## 2024-07-01 RX ORDER — DIPHENHYDRAMINE HYDROCHLORIDE 50 MG/ML
50 INJECTION INTRAMUSCULAR; INTRAVENOUS AS NEEDED
OUTPATIENT
Start: 2024-08-27

## 2024-07-01 RX ORDER — FAMOTIDINE 10 MG/ML
20 INJECTION INTRAVENOUS ONCE AS NEEDED
OUTPATIENT
Start: 2024-09-24

## 2024-07-01 RX ORDER — DIPHENHYDRAMINE HYDROCHLORIDE 50 MG/ML
50 INJECTION INTRAMUSCULAR; INTRAVENOUS AS NEEDED
OUTPATIENT
Start: 2024-07-30

## 2024-07-01 RX ORDER — ALBUTEROL SULFATE 0.83 MG/ML
3 SOLUTION RESPIRATORY (INHALATION) AS NEEDED
OUTPATIENT
Start: 2024-07-30

## 2024-07-01 RX ORDER — PROCHLORPERAZINE MALEATE 10 MG
10 TABLET ORAL EVERY 6 HOURS PRN
OUTPATIENT
Start: 2024-09-24

## 2024-07-01 RX ORDER — PROCHLORPERAZINE MALEATE 10 MG
10 TABLET ORAL EVERY 6 HOURS PRN
OUTPATIENT
Start: 2024-10-22

## 2024-07-01 RX ORDER — FAMOTIDINE 10 MG/ML
20 INJECTION INTRAVENOUS ONCE AS NEEDED
OUTPATIENT
Start: 2024-07-30

## 2024-07-01 RX ORDER — ALBUTEROL SULFATE 0.83 MG/ML
3 SOLUTION RESPIRATORY (INHALATION) AS NEEDED
OUTPATIENT
Start: 2024-09-24

## 2024-07-01 RX ORDER — FAMOTIDINE 10 MG/ML
20 INJECTION INTRAVENOUS ONCE AS NEEDED
OUTPATIENT
Start: 2024-10-22

## 2024-07-01 RX ORDER — PROCHLORPERAZINE EDISYLATE 5 MG/ML
10 INJECTION INTRAMUSCULAR; INTRAVENOUS EVERY 6 HOURS PRN
OUTPATIENT
Start: 2024-08-27

## 2024-07-02 ENCOUNTER — INFUSION (OUTPATIENT)
Dept: HEMATOLOGY/ONCOLOGY | Facility: CLINIC | Age: 64
End: 2024-07-02
Payer: COMMERCIAL

## 2024-07-02 VITALS
SYSTOLIC BLOOD PRESSURE: 144 MMHG | WEIGHT: 267.75 LBS | DIASTOLIC BLOOD PRESSURE: 77 MMHG | HEART RATE: 71 BPM | BODY MASS INDEX: 38.42 KG/M2 | RESPIRATION RATE: 16 BRPM | TEMPERATURE: 97.7 F | OXYGEN SATURATION: 97 %

## 2024-07-02 DIAGNOSIS — C64.1 RENAL CELL CARCINOMA OF RIGHT KIDNEY (MULTI): Primary | ICD-10-CM

## 2024-07-02 LAB
ALBUMIN SERPL BCP-MCNC: 3.9 G/DL (ref 3.4–5)
ALP SERPL-CCNC: 58 U/L (ref 33–136)
ALT SERPL W P-5'-P-CCNC: 14 U/L (ref 10–52)
ANION GAP SERPL CALC-SCNC: 12 MMOL/L (ref 10–20)
AST SERPL W P-5'-P-CCNC: 17 U/L (ref 9–39)
BASOPHILS # BLD AUTO: 0.03 X10*3/UL (ref 0–0.1)
BASOPHILS NFR BLD AUTO: 0.5 %
BILIRUB SERPL-MCNC: 0.5 MG/DL (ref 0–1.2)
BUN SERPL-MCNC: 15 MG/DL (ref 6–23)
CALCIUM SERPL-MCNC: 8.7 MG/DL (ref 8.6–10.3)
CHLORIDE SERPL-SCNC: 104 MMOL/L (ref 98–107)
CO2 SERPL-SCNC: 25 MMOL/L (ref 21–32)
CORTIS AM PEAK SERPL-MSCNC: 11.4 UG/DL (ref 5–20)
CREAT SERPL-MCNC: 1.44 MG/DL (ref 0.5–1.3)
EGFRCR SERPLBLD CKD-EPI 2021: 55 ML/MIN/1.73M*2
EOSINOPHIL # BLD AUTO: 0.17 X10*3/UL (ref 0–0.7)
EOSINOPHIL NFR BLD AUTO: 3.1 %
ERYTHROCYTE [DISTWIDTH] IN BLOOD BY AUTOMATED COUNT: 12.6 % (ref 11.5–14.5)
GLUCOSE SERPL-MCNC: 255 MG/DL (ref 74–99)
HCT VFR BLD AUTO: 44.4 % (ref 41–52)
HGB BLD-MCNC: 15 G/DL (ref 13.5–17.5)
IMM GRANULOCYTES # BLD AUTO: 0 X10*3/UL (ref 0–0.7)
IMM GRANULOCYTES NFR BLD AUTO: 0 % (ref 0–0.9)
LYMPHOCYTES # BLD AUTO: 0.94 X10*3/UL (ref 1.2–4.8)
LYMPHOCYTES NFR BLD AUTO: 17.2 %
MCH RBC QN AUTO: 29.8 PG (ref 26–34)
MCHC RBC AUTO-ENTMCNC: 33.8 G/DL (ref 32–36)
MCV RBC AUTO: 88 FL (ref 80–100)
MONOCYTES # BLD AUTO: 0.33 X10*3/UL (ref 0.1–1)
MONOCYTES NFR BLD AUTO: 6 %
NEUTROPHILS # BLD AUTO: 4.01 X10*3/UL (ref 1.2–7.7)
NEUTROPHILS NFR BLD AUTO: 73.2 %
PLATELET # BLD AUTO: 194 X10*3/UL (ref 150–450)
POTASSIUM SERPL-SCNC: 4.1 MMOL/L (ref 3.5–5.3)
PROT SERPL-MCNC: 6.9 G/DL (ref 6.4–8.2)
RBC # BLD AUTO: 5.03 X10*6/UL (ref 4.5–5.9)
SODIUM SERPL-SCNC: 137 MMOL/L (ref 136–145)
T4 FREE SERPL-MCNC: 0.88 NG/DL (ref 0.61–1.12)
TSH SERPL-ACNC: 0.38 MIU/L (ref 0.44–3.98)
WBC # BLD AUTO: 5.5 X10*3/UL (ref 4.4–11.3)

## 2024-07-02 PROCEDURE — 85025 COMPLETE CBC W/AUTO DIFF WBC: CPT

## 2024-07-02 PROCEDURE — 2500000004 HC RX 250 GENERAL PHARMACY W/ HCPCS (ALT 636 FOR OP/ED): Performed by: INTERNAL MEDICINE

## 2024-07-02 PROCEDURE — 82533 TOTAL CORTISOL: CPT | Mod: GEALAB

## 2024-07-02 PROCEDURE — 84075 ASSAY ALKALINE PHOSPHATASE: CPT

## 2024-07-02 PROCEDURE — 84443 ASSAY THYROID STIM HORMONE: CPT

## 2024-07-02 PROCEDURE — 82024 ASSAY OF ACTH: CPT

## 2024-07-02 PROCEDURE — 84439 ASSAY OF FREE THYROXINE: CPT

## 2024-07-02 PROCEDURE — 96375 TX/PRO/DX INJ NEW DRUG ADDON: CPT | Mod: INF

## 2024-07-02 PROCEDURE — 2500000001 HC RX 250 WO HCPCS SELF ADMINISTERED DRUGS (ALT 637 FOR MEDICARE OP): Performed by: INTERNAL MEDICINE

## 2024-07-02 PROCEDURE — 96413 CHEMO IV INFUSION 1 HR: CPT

## 2024-07-02 RX ORDER — FAMOTIDINE 10 MG/ML
20 INJECTION INTRAVENOUS ONCE AS NEEDED
Status: DISCONTINUED | OUTPATIENT
Start: 2024-07-02 | End: 2024-07-02 | Stop reason: HOSPADM

## 2024-07-02 RX ORDER — DIPHENHYDRAMINE HYDROCHLORIDE 50 MG/ML
50 INJECTION INTRAMUSCULAR; INTRAVENOUS AS NEEDED
Status: DISCONTINUED | OUTPATIENT
Start: 2024-07-02 | End: 2024-07-02 | Stop reason: HOSPADM

## 2024-07-02 RX ORDER — PROCHLORPERAZINE EDISYLATE 5 MG/ML
10 INJECTION INTRAMUSCULAR; INTRAVENOUS EVERY 6 HOURS PRN
Status: DISCONTINUED | OUTPATIENT
Start: 2024-07-02 | End: 2024-07-02 | Stop reason: HOSPADM

## 2024-07-02 RX ORDER — EPINEPHRINE 0.3 MG/.3ML
0.3 INJECTION SUBCUTANEOUS EVERY 5 MIN PRN
Status: DISCONTINUED | OUTPATIENT
Start: 2024-07-02 | End: 2024-07-02 | Stop reason: HOSPADM

## 2024-07-02 RX ORDER — ALBUTEROL SULFATE 0.83 MG/ML
3 SOLUTION RESPIRATORY (INHALATION) AS NEEDED
Status: DISCONTINUED | OUTPATIENT
Start: 2024-07-02 | End: 2024-07-02 | Stop reason: HOSPADM

## 2024-07-02 RX ORDER — LORAZEPAM 0.5 MG/1
0.5 TABLET ORAL ONCE
Status: COMPLETED | OUTPATIENT
Start: 2024-07-02 | End: 2024-07-02

## 2024-07-02 RX ORDER — PROCHLORPERAZINE MALEATE 10 MG
10 TABLET ORAL EVERY 6 HOURS PRN
Status: DISCONTINUED | OUTPATIENT
Start: 2024-07-02 | End: 2024-07-02 | Stop reason: HOSPADM

## 2024-07-02 ASSESSMENT — PAIN SCALES - GENERAL: PAINLEVEL: 0-NO PAIN

## 2024-07-02 NOTE — SIGNIFICANT EVENT
07/02/24 1046   Prechemo Checklist   Has the patient been in the hospital, ED, or urgent care since last date of service No   Chemo/Immuno Consent Completed and Signed Yes   Protocol/Indications Verified Yes   Confirmed to previous date/time of medication Yes   Compared to previous dose Yes   All medications are dated accurately Yes   Pregnancy Test Negative Not applicable   Parameters Met Yes   BSA/Weight-Height Verified Yes

## 2024-07-04 LAB — ACTH PLAS-MCNC: 51.6 PG/ML (ref 7.2–63.3)

## 2024-07-30 ENCOUNTER — INFUSION (OUTPATIENT)
Dept: HEMATOLOGY/ONCOLOGY | Facility: CLINIC | Age: 64
End: 2024-07-30
Payer: COMMERCIAL

## 2024-07-30 VITALS
RESPIRATION RATE: 16 BRPM | TEMPERATURE: 97.3 F | DIASTOLIC BLOOD PRESSURE: 89 MMHG | WEIGHT: 268.08 LBS | BODY MASS INDEX: 38.47 KG/M2 | HEART RATE: 72 BPM | SYSTOLIC BLOOD PRESSURE: 154 MMHG | OXYGEN SATURATION: 95 %

## 2024-07-30 DIAGNOSIS — C64.1 RENAL CELL CARCINOMA OF RIGHT KIDNEY (MULTI): ICD-10-CM

## 2024-07-30 LAB
ALBUMIN SERPL BCP-MCNC: 3.9 G/DL (ref 3.4–5)
ALP SERPL-CCNC: 61 U/L (ref 33–136)
ALT SERPL W P-5'-P-CCNC: 14 U/L (ref 10–52)
ANION GAP SERPL CALC-SCNC: 10 MMOL/L (ref 10–20)
AST SERPL W P-5'-P-CCNC: 13 U/L (ref 9–39)
BASOPHILS # BLD AUTO: 0.04 X10*3/UL (ref 0–0.1)
BASOPHILS NFR BLD AUTO: 0.7 %
BILIRUB SERPL-MCNC: 0.5 MG/DL (ref 0–1.2)
BUN SERPL-MCNC: 19 MG/DL (ref 6–23)
CALCIUM SERPL-MCNC: 9.2 MG/DL (ref 8.6–10.3)
CHLORIDE SERPL-SCNC: 103 MMOL/L (ref 98–107)
CO2 SERPL-SCNC: 30 MMOL/L (ref 21–32)
CORTIS AM PEAK SERPL-MSCNC: 9.5 UG/DL (ref 5–20)
CREAT SERPL-MCNC: 1.43 MG/DL (ref 0.5–1.3)
EGFRCR SERPLBLD CKD-EPI 2021: 55 ML/MIN/1.73M*2
EOSINOPHIL # BLD AUTO: 0.24 X10*3/UL (ref 0–0.7)
EOSINOPHIL NFR BLD AUTO: 4.3 %
ERYTHROCYTE [DISTWIDTH] IN BLOOD BY AUTOMATED COUNT: 12.6 % (ref 11.5–14.5)
GLUCOSE SERPL-MCNC: 171 MG/DL (ref 74–99)
HCT VFR BLD AUTO: 45.3 % (ref 41–52)
HGB BLD-MCNC: 15.2 G/DL (ref 13.5–17.5)
IMM GRANULOCYTES # BLD AUTO: 0.02 X10*3/UL (ref 0–0.7)
IMM GRANULOCYTES NFR BLD AUTO: 0.4 % (ref 0–0.9)
LYMPHOCYTES # BLD AUTO: 1.06 X10*3/UL (ref 1.2–4.8)
LYMPHOCYTES NFR BLD AUTO: 18.8 %
MCH RBC QN AUTO: 29.5 PG (ref 26–34)
MCHC RBC AUTO-ENTMCNC: 33.6 G/DL (ref 32–36)
MCV RBC AUTO: 88 FL (ref 80–100)
MONOCYTES # BLD AUTO: 0.37 X10*3/UL (ref 0.1–1)
MONOCYTES NFR BLD AUTO: 6.6 %
NEUTROPHILS # BLD AUTO: 3.9 X10*3/UL (ref 1.2–7.7)
NEUTROPHILS NFR BLD AUTO: 69.2 %
PLATELET # BLD AUTO: 184 X10*3/UL (ref 150–450)
POTASSIUM SERPL-SCNC: 3.9 MMOL/L (ref 3.5–5.3)
PROT SERPL-MCNC: 7 G/DL (ref 6.4–8.2)
RBC # BLD AUTO: 5.15 X10*6/UL (ref 4.5–5.9)
SODIUM SERPL-SCNC: 139 MMOL/L (ref 136–145)
TSH SERPL-ACNC: 0.53 MIU/L (ref 0.44–3.98)
WBC # BLD AUTO: 5.6 X10*3/UL (ref 4.4–11.3)

## 2024-07-30 PROCEDURE — 82024 ASSAY OF ACTH: CPT

## 2024-07-30 PROCEDURE — 2500000001 HC RX 250 WO HCPCS SELF ADMINISTERED DRUGS (ALT 637 FOR MEDICARE OP): Performed by: INTERNAL MEDICINE

## 2024-07-30 PROCEDURE — 85025 COMPLETE CBC W/AUTO DIFF WBC: CPT

## 2024-07-30 PROCEDURE — 82533 TOTAL CORTISOL: CPT | Mod: GEALAB

## 2024-07-30 PROCEDURE — 96375 TX/PRO/DX INJ NEW DRUG ADDON: CPT | Mod: INF

## 2024-07-30 PROCEDURE — 96413 CHEMO IV INFUSION 1 HR: CPT

## 2024-07-30 PROCEDURE — 2500000004 HC RX 250 GENERAL PHARMACY W/ HCPCS (ALT 636 FOR OP/ED): Performed by: INTERNAL MEDICINE

## 2024-07-30 PROCEDURE — 2500000004 HC RX 250 GENERAL PHARMACY W/ HCPCS (ALT 636 FOR OP/ED): Mod: JZ | Performed by: INTERNAL MEDICINE

## 2024-07-30 PROCEDURE — 84443 ASSAY THYROID STIM HORMONE: CPT

## 2024-07-30 PROCEDURE — 80053 COMPREHEN METABOLIC PANEL: CPT

## 2024-07-30 RX ORDER — EPINEPHRINE 0.3 MG/.3ML
0.3 INJECTION SUBCUTANEOUS EVERY 5 MIN PRN
Status: DISCONTINUED | OUTPATIENT
Start: 2024-07-30 | End: 2024-07-30 | Stop reason: HOSPADM

## 2024-07-30 RX ORDER — PROCHLORPERAZINE MALEATE 10 MG
10 TABLET ORAL EVERY 6 HOURS PRN
Status: DISCONTINUED | OUTPATIENT
Start: 2024-07-30 | End: 2024-07-30 | Stop reason: HOSPADM

## 2024-07-30 RX ORDER — ALBUTEROL SULFATE 0.83 MG/ML
3 SOLUTION RESPIRATORY (INHALATION) AS NEEDED
Status: DISCONTINUED | OUTPATIENT
Start: 2024-07-30 | End: 2024-07-30 | Stop reason: HOSPADM

## 2024-07-30 RX ORDER — HEPARIN 100 UNIT/ML
500 SYRINGE INTRAVENOUS AS NEEDED
OUTPATIENT
Start: 2024-07-30

## 2024-07-30 RX ORDER — DIPHENHYDRAMINE HYDROCHLORIDE 50 MG/ML
50 INJECTION INTRAMUSCULAR; INTRAVENOUS AS NEEDED
Status: DISCONTINUED | OUTPATIENT
Start: 2024-07-30 | End: 2024-07-30 | Stop reason: HOSPADM

## 2024-07-30 RX ORDER — LORAZEPAM 0.5 MG/1
0.5 TABLET ORAL ONCE
Status: COMPLETED | OUTPATIENT
Start: 2024-07-30 | End: 2024-07-30

## 2024-07-30 RX ORDER — FAMOTIDINE 10 MG/ML
20 INJECTION INTRAVENOUS ONCE AS NEEDED
Status: DISCONTINUED | OUTPATIENT
Start: 2024-07-30 | End: 2024-07-30 | Stop reason: HOSPADM

## 2024-07-30 RX ORDER — PROCHLORPERAZINE EDISYLATE 5 MG/ML
10 INJECTION INTRAMUSCULAR; INTRAVENOUS EVERY 6 HOURS PRN
Status: DISCONTINUED | OUTPATIENT
Start: 2024-07-30 | End: 2024-07-30 | Stop reason: HOSPADM

## 2024-07-30 RX ORDER — HEPARIN SODIUM,PORCINE/PF 10 UNIT/ML
50 SYRINGE (ML) INTRAVENOUS AS NEEDED
OUTPATIENT
Start: 2024-07-30

## 2024-07-30 ASSESSMENT — PAIN SCALES - GENERAL: PAINLEVEL: 0-NO PAIN

## 2024-07-30 NOTE — SIGNIFICANT EVENT

## 2024-08-01 ENCOUNTER — PATIENT MESSAGE (OUTPATIENT)
Dept: RADIATION ONCOLOGY | Facility: HOSPITAL | Age: 64
End: 2024-08-01
Payer: COMMERCIAL

## 2024-08-01 LAB — ACTH PLAS-MCNC: 87.4 PG/ML (ref 7.2–63.3)

## 2024-08-08 NOTE — PATIENT COMMUNICATION
I called and left a message regarding the lab value and indicated it is not from radiation but possibly from immunotherapy and advised him to reach out to his medical oncologist.

## 2024-08-12 ENCOUNTER — HOSPITAL ENCOUNTER (OUTPATIENT)
Dept: RADIOLOGY | Facility: HOSPITAL | Age: 64
Discharge: HOME | End: 2024-08-12
Payer: COMMERCIAL

## 2024-08-12 DIAGNOSIS — C64.1 RENAL CELL CARCINOMA OF RIGHT KIDNEY (MULTI): ICD-10-CM

## 2024-08-12 PROCEDURE — 71260 CT THORAX DX C+: CPT | Performed by: STUDENT IN AN ORGANIZED HEALTH CARE EDUCATION/TRAINING PROGRAM

## 2024-08-12 PROCEDURE — 2550000001 HC RX 255 CONTRASTS: Performed by: RADIOLOGY

## 2024-08-12 PROCEDURE — 74177 CT ABD & PELVIS W/CONTRAST: CPT | Performed by: STUDENT IN AN ORGANIZED HEALTH CARE EDUCATION/TRAINING PROGRAM

## 2024-08-12 PROCEDURE — 74177 CT ABD & PELVIS W/CONTRAST: CPT

## 2024-08-16 ENCOUNTER — APPOINTMENT (OUTPATIENT)
Dept: RADIOLOGY | Facility: HOSPITAL | Age: 64
End: 2024-08-16
Payer: COMMERCIAL

## 2024-08-19 NOTE — PROGRESS NOTES
Staff Physician: Breana Arora MD  Referring Physician: Breana Arora MD  Date of Service: 8/26/2024  RADIATION ONCOLOGY FOLLOW UP NOTE  IDENTIFYING DATA:   Cancer Staging   No matching staging information was found for the patient.    Problem List Items Addressed This Visit       Renal cell carcinoma of right kidney (Multi)    Relevant Orders    Clinic Appointment Request Follow Up; BREANA ARORA; [facility] S600 RADONC (Completed)     Mr. Burns is a 63-year-old man with originally [id number] Stage III renal cell carcinoma, status post right nephrectomy, with subsequent recurrence at the resection bed with direct extension to the liver, status-post resection and partial hepatectomy,  then placed on immunotherapy, who developed a second recurrence in the soft tissue at the nephrectomy bed. He then completed SBRT to 50Gy in 5fx to the soft tissue recurrence (5.6cm in max diameter at that time), with COT on 05/19/2023.   He presents today for routine interval follow-up.     INTERVAL HISTORY  Since we last saw Jason Burns in clinic on 05/13/2024, he has felt well.  His weight is stable from last visit and his appetite is unchanged. He denies any significant symptoms at this time. Specifically, he denies abdominal pain, pain after eating, nausea, vomiting, early satiety, tarry or sticky stool, steatorrhea, diarrhea, jaundice, dark urine, or light stools. He has had no new medical problems or medications since our last visit.     His last imaging, comprising CT-CAP on 08/12/2024, demonstrates stable disease with no new lesions. He continues on systemic therapy with no issues at this time and feels well.      PAST MEDICAL, SURGICAL, FAMILY, AND SOCIAL HISTORY:  Past Medical History:   Diagnosis Date    Campylobacter enteritis 11/02/2018    Campylobacter gastroenteritis    Personal history of other diseases of the digestive system 08/11/2016    History of umbilical hernia    Plantar fascial fibromatosis  "2015    Plantar fasciitis    Renal cell cancer (Multi)     Unspecified sprain of unspecified great toe, initial encounter 2015    Sprain of great toe     Past Surgical History:   Procedure Laterality Date    COLONOSCOPY  10/21/2013    Complete Colonoscopy    CT ABDOMEN PELVIS ANGIOGRAM W AND/OR WO IV CONTRAST  2021    CT ABDOMEN PELVIS ANGIOGRAM W AND/OR WO IV CONTRAST 2021 [facility]    CT ABDOMEN PELVIS ANGIOGRAM W AND/OR WO IV CONTRAST  2/10/2021    CT ABDOMEN PELVIS ANGIOGRAM W AND/OR WO IV CONTRAST 2/10/2021 [facility]    UMBILICAL HERNIA REPAIR  2018    Umbilical Hernia Repair    US GUIDED NEEDLE LIVER BIOPSY  10/12/2020    US GUIDED NEEDLE LIVER BIOPSY 10/12/2020 [facility]     ALLERGIES:  No Known Allergies  MEDICATIONS:    Current Outpatient Medications:     cyanocobalamin (Vitamin B-12) 1,000 mcg tablet, Take 1 tablet (1,000 mcg) by mouth once daily., Disp: , Rfl:     escitalopram (Lexapro) 10 mg tablet, Take 1 tablet (10 mg) by mouth once daily., Disp: , Rfl:     losartan (Cozaar) 50 mg tablet, Take 1 tablet (50 mg) by mouth once daily., Disp: , Rfl:     multivitamin tablet, Take 1 tablet by mouth once daily., Disp: , Rfl:      REVIEW OF SYSTEMS:  Except for the symptoms described in the interval history, the review of systems is negative. Specifically, except as noted, when asked the patient expressed no complaints relative to constitutional (fever, weight loss), eyes, ears, nose, mouth, throat, neurologic, cardiovascular, pulmonary, breast, GI, , skin, musculoskeletal, endocrine, hematologic/lymphatic, or immunologic systems.    PERFORMANCE STATUS:  Karnofsky Performance Score/ECO, Fully active, able to carry on all pre-disease performed without restriction (ECOG equivalent 0)    PHYSICAL EXAMINATION:  /89   Pulse 68   Temp 36.5 °C (97.7 °F) (Temporal)   Resp 18   Ht 1.778 m (5' 10\")   Wt 122 kg (269 lb 8 oz)   SpO2 98%   BMI 38.67 " kg/m²   Pain score: 0/10  General: no acute distress, engaged in conversation.   HEENT: Normocephalic, atraumatic. Extraocular movements are intact.   Neck: supple with trachea at midline, no palpable adenopathy.   Pulmonary: Breathing comfortably on room air, no respiratory distress  Cardiovascular: Regular rate, no cyanosis, well-perfused  Abdomen: Soft, nontender, nondistended.   Extremities: No lower extremity edema or cyanosis. Normal range of motion.  Skin: Without rash or obvious lesions.   Musculoskeletal: Normal range of motion. Able to raise both arms above head without issues  Neurologic: Alert and oriented x3. Cranial nerves grossly intact.        DIAGNOSTIC REPORTS REVIEWED:  Imaging: All imaging was personally reviewed and interpreted in clinic. Findings as per interval history and EMR.  Laboratory/Pathology:  All pertinent labs and pathology were personally reviewed and interpreted in clinic. Findings as per interval history and EMR.      IMPRESSION:  Mr. Burns has no radiographic evidence of active disease at this time and no adverse effects of RT.       PLAN:  I have asked Jason Burns to please return to clinic in 3 month's time with a repeat CT-CAP, Ca 19-9, CBC, and CMP at that time. This will be coordinated with medical oncology. He knows to call with any questions or concerns in the interim.       PAIN PLAN: The patient reports their pain is well-controlled on their current regimen.  Their pain regimen is currently managed by Medical Oncology.      DISEASE STATUS: Controlled  NEW METACHRONOUS CANCER: No    Thank you for the opportunity to participate in the ongoing care of this pleasant MAN.      Mercedes Huggins MD  PGY-5, Radiation Oncology  8/26/2024 10:41 AM

## 2024-08-20 ENCOUNTER — APPOINTMENT (OUTPATIENT)
Dept: RADIATION ONCOLOGY | Facility: HOSPITAL | Age: 64
End: 2024-08-20
Payer: COMMERCIAL

## 2024-08-23 ENCOUNTER — TELEPHONE (OUTPATIENT)
Dept: RADIATION ONCOLOGY | Facility: HOSPITAL | Age: 64
End: 2024-08-23
Payer: COMMERCIAL

## 2024-08-26 ENCOUNTER — HOSPITAL ENCOUNTER (OUTPATIENT)
Dept: RADIATION ONCOLOGY | Facility: HOSPITAL | Age: 64
Setting detail: RADIATION/ONCOLOGY SERIES
Discharge: HOME | End: 2024-08-26
Payer: COMMERCIAL

## 2024-08-26 VITALS
BODY MASS INDEX: 38.58 KG/M2 | RESPIRATION RATE: 18 BRPM | SYSTOLIC BLOOD PRESSURE: 137 MMHG | TEMPERATURE: 97.7 F | DIASTOLIC BLOOD PRESSURE: 89 MMHG | OXYGEN SATURATION: 98 % | WEIGHT: 269.5 LBS | HEART RATE: 68 BPM | HEIGHT: 70 IN

## 2024-08-26 DIAGNOSIS — C64.1 RENAL CELL CARCINOMA OF RIGHT KIDNEY (MULTI): ICD-10-CM

## 2024-08-26 PROCEDURE — 99213 OFFICE O/P EST LOW 20 MIN: CPT | Performed by: RADIOLOGY

## 2024-08-26 ASSESSMENT — ENCOUNTER SYMPTOMS
DEPRESSION: 0
CARDIOVASCULAR NEGATIVE: 1
RESPIRATORY NEGATIVE: 1
OCCASIONAL FEELINGS OF UNSTEADINESS: 0
PSYCHIATRIC NEGATIVE: 1
GASTROINTESTINAL NEGATIVE: 1
MUSCULOSKELETAL NEGATIVE: 1
NEUROLOGICAL NEGATIVE: 1
EYE PROBLEMS: 1
HEMATOLOGIC/LYMPHATIC NEGATIVE: 1
CONSTITUTIONAL NEGATIVE: 1
LOSS OF SENSATION IN FEET: 0

## 2024-08-26 ASSESSMENT — PATIENT HEALTH QUESTIONNAIRE - PHQ9
1. LITTLE INTEREST OR PLEASURE IN DOING THINGS: NOT AT ALL
2. FEELING DOWN, DEPRESSED OR HOPELESS: NOT AT ALL
SUM OF ALL RESPONSES TO PHQ9 QUESTIONS 1 AND 2: 0

## 2024-08-26 ASSESSMENT — COLUMBIA-SUICIDE SEVERITY RATING SCALE - C-SSRS
6. HAVE YOU EVER DONE ANYTHING, STARTED TO DO ANYTHING, OR PREPARED TO DO ANYTHING TO END YOUR LIFE?: NO
2. HAVE YOU ACTUALLY HAD ANY THOUGHTS OF KILLING YOURSELF?: NO
1. IN THE PAST MONTH, HAVE YOU WISHED YOU WERE DEAD OR WISHED YOU COULD GO TO SLEEP AND NOT WAKE UP?: NO

## 2024-08-26 NOTE — PROGRESS NOTES
Radiation Oncology Nursing Note    Pain: The patient's current pain level was assessed.  They report currently having a pain of 0 out of 10.  They feel their pain is under control without the use of pain medications.    Review of Systems:  Review of Systems   Constitutional: Negative.    HENT:  Negative.     Eyes:  Positive for eye problems.        Wears glasses   Respiratory: Negative.     Cardiovascular: Negative.    Gastrointestinal: Negative.    Genitourinary: Negative.     Musculoskeletal: Negative.    Skin: Negative.    Neurological: Negative.    Hematological: Negative.    Psychiatric/Behavioral: Negative.

## 2024-08-27 ENCOUNTER — OFFICE VISIT (OUTPATIENT)
Dept: HEMATOLOGY/ONCOLOGY | Facility: CLINIC | Age: 64
End: 2024-08-27
Payer: COMMERCIAL

## 2024-08-27 ENCOUNTER — INFUSION (OUTPATIENT)
Dept: HEMATOLOGY/ONCOLOGY | Facility: CLINIC | Age: 64
End: 2024-08-27
Payer: COMMERCIAL

## 2024-08-27 ENCOUNTER — LAB REQUISITION (OUTPATIENT)
Dept: LAB | Facility: HOSPITAL | Age: 64
End: 2024-08-27
Payer: COMMERCIAL

## 2024-08-27 VITALS
WEIGHT: 269.29 LBS | BODY MASS INDEX: 38.64 KG/M2 | OXYGEN SATURATION: 96 % | TEMPERATURE: 97.2 F | HEART RATE: 76 BPM | RESPIRATION RATE: 17 BRPM | SYSTOLIC BLOOD PRESSURE: 152 MMHG | DIASTOLIC BLOOD PRESSURE: 92 MMHG

## 2024-08-27 DIAGNOSIS — E55.9 VITAMIN D DEFICIENCY, UNSPECIFIED: ICD-10-CM

## 2024-08-27 DIAGNOSIS — C64.1 RENAL CELL CARCINOMA OF RIGHT KIDNEY (MULTI): ICD-10-CM

## 2024-08-27 DIAGNOSIS — E11.9 TYPE 2 DIABETES MELLITUS WITHOUT COMPLICATIONS (MULTI): ICD-10-CM

## 2024-08-27 DIAGNOSIS — Z12.5 ENCOUNTER FOR SCREENING FOR MALIGNANT NEOPLASM OF PROSTATE: ICD-10-CM

## 2024-08-27 DIAGNOSIS — Z00.00 ENCOUNTER FOR GENERAL ADULT MEDICAL EXAMINATION WITHOUT ABNORMAL FINDINGS: ICD-10-CM

## 2024-08-27 LAB
25(OH)D3 SERPL-MCNC: 35 NG/ML (ref 30–100)
ALBUMIN SERPL BCP-MCNC: 4.2 G/DL (ref 3.4–5)
ALP SERPL-CCNC: 60 U/L (ref 33–136)
ALT SERPL W P-5'-P-CCNC: 14 U/L (ref 10–52)
ANION GAP SERPL CALC-SCNC: 12 MMOL/L (ref 10–20)
AST SERPL W P-5'-P-CCNC: 15 U/L (ref 9–39)
BASOPHILS # BLD AUTO: 0.04 X10*3/UL (ref 0–0.1)
BASOPHILS NFR BLD AUTO: 0.8 %
BILIRUB SERPL-MCNC: 0.7 MG/DL (ref 0–1.2)
BUN SERPL-MCNC: 17 MG/DL (ref 6–23)
CALCIUM SERPL-MCNC: 9.4 MG/DL (ref 8.6–10.3)
CHLORIDE SERPL-SCNC: 101 MMOL/L (ref 98–107)
CHOLEST SERPL-MCNC: 156 MG/DL (ref 0–199)
CHOLESTEROL/HDL RATIO: 2.8
CO2 SERPL-SCNC: 28 MMOL/L (ref 21–32)
CORTIS AM PEAK SERPL-MSCNC: 11.1 UG/DL (ref 5–20)
CREAT SERPL-MCNC: 1.42 MG/DL (ref 0.5–1.3)
EGFRCR SERPLBLD CKD-EPI 2021: 56 ML/MIN/1.73M*2
EOSINOPHIL # BLD AUTO: 0.21 X10*3/UL (ref 0–0.7)
EOSINOPHIL NFR BLD AUTO: 4.1 %
ERYTHROCYTE [DISTWIDTH] IN BLOOD BY AUTOMATED COUNT: 12.6 % (ref 11.5–14.5)
EST. AVERAGE GLUCOSE BLD GHB EST-MCNC: 189 MG/DL
GLUCOSE SERPL-MCNC: 193 MG/DL (ref 74–99)
HBA1C MFR BLD: 8.2 %
HCT VFR BLD AUTO: 46.6 % (ref 41–52)
HDLC SERPL-MCNC: 56.5 MG/DL
HGB BLD-MCNC: 15.9 G/DL (ref 13.5–17.5)
IMM GRANULOCYTES # BLD AUTO: 0.01 X10*3/UL (ref 0–0.7)
IMM GRANULOCYTES NFR BLD AUTO: 0.2 % (ref 0–0.9)
LDLC SERPL CALC-MCNC: 83 MG/DL
LYMPHOCYTES # BLD AUTO: 1.07 X10*3/UL (ref 1.2–4.8)
LYMPHOCYTES NFR BLD AUTO: 20.7 %
MAGNESIUM SERPL-MCNC: 1.91 MG/DL (ref 1.6–2.4)
MCH RBC QN AUTO: 29.9 PG (ref 26–34)
MCHC RBC AUTO-ENTMCNC: 34.1 G/DL (ref 32–36)
MCV RBC AUTO: 88 FL (ref 80–100)
MONOCYTES # BLD AUTO: 0.35 X10*3/UL (ref 0.1–1)
MONOCYTES NFR BLD AUTO: 6.8 %
NEUTROPHILS # BLD AUTO: 3.48 X10*3/UL (ref 1.2–7.7)
NEUTROPHILS NFR BLD AUTO: 67.4 %
NON HDL CHOLESTEROL: 100 MG/DL (ref 0–149)
PLATELET # BLD AUTO: 189 X10*3/UL (ref 150–450)
POTASSIUM SERPL-SCNC: 4.3 MMOL/L (ref 3.5–5.3)
PROT SERPL-MCNC: 7.4 G/DL (ref 6.4–8.2)
PSA SERPL-MCNC: 1.76 NG/ML
RBC # BLD AUTO: 5.32 X10*6/UL (ref 4.5–5.9)
SODIUM SERPL-SCNC: 137 MMOL/L (ref 136–145)
T4 FREE SERPL-MCNC: 0.91 NG/DL (ref 0.61–1.12)
TRIGL SERPL-MCNC: 83 MG/DL (ref 0–149)
TSH SERPL-ACNC: 0.31 MIU/L (ref 0.44–3.98)
VLDL: 17 MG/DL (ref 0–40)
WBC # BLD AUTO: 5.2 X10*3/UL (ref 4.4–11.3)

## 2024-08-27 PROCEDURE — 84439 ASSAY OF FREE THYROXINE: CPT

## 2024-08-27 PROCEDURE — 2500000004 HC RX 250 GENERAL PHARMACY W/ HCPCS (ALT 636 FOR OP/ED): Mod: JZ | Performed by: INTERNAL MEDICINE

## 2024-08-27 PROCEDURE — 85025 COMPLETE CBC W/AUTO DIFF WBC: CPT

## 2024-08-27 PROCEDURE — 3077F SYST BP >= 140 MM HG: CPT | Performed by: INTERNAL MEDICINE

## 2024-08-27 PROCEDURE — 99215 OFFICE O/P EST HI 40 MIN: CPT | Performed by: INTERNAL MEDICINE

## 2024-08-27 PROCEDURE — 2500000004 HC RX 250 GENERAL PHARMACY W/ HCPCS (ALT 636 FOR OP/ED): Performed by: INTERNAL MEDICINE

## 2024-08-27 PROCEDURE — 83735 ASSAY OF MAGNESIUM: CPT

## 2024-08-27 PROCEDURE — 2500000001 HC RX 250 WO HCPCS SELF ADMINISTERED DRUGS (ALT 637 FOR MEDICARE OP): Performed by: INTERNAL MEDICINE

## 2024-08-27 PROCEDURE — 4010F ACE/ARB THERAPY RXD/TAKEN: CPT | Performed by: INTERNAL MEDICINE

## 2024-08-27 PROCEDURE — 80061 LIPID PANEL: CPT

## 2024-08-27 PROCEDURE — 96413 CHEMO IV INFUSION 1 HR: CPT

## 2024-08-27 PROCEDURE — 83036 HEMOGLOBIN GLYCOSYLATED A1C: CPT

## 2024-08-27 PROCEDURE — 3048F LDL-C <100 MG/DL: CPT | Performed by: INTERNAL MEDICINE

## 2024-08-27 PROCEDURE — 84075 ASSAY ALKALINE PHOSPHATASE: CPT

## 2024-08-27 PROCEDURE — 82533 TOTAL CORTISOL: CPT | Mod: GEALAB

## 2024-08-27 PROCEDURE — 84443 ASSAY THYROID STIM HORMONE: CPT

## 2024-08-27 PROCEDURE — 3052F HG A1C>EQUAL 8.0%<EQUAL 9.0%: CPT | Performed by: INTERNAL MEDICINE

## 2024-08-27 PROCEDURE — 82024 ASSAY OF ACTH: CPT

## 2024-08-27 PROCEDURE — 3080F DIAST BP >= 90 MM HG: CPT | Performed by: INTERNAL MEDICINE

## 2024-08-27 PROCEDURE — 96375 TX/PRO/DX INJ NEW DRUG ADDON: CPT | Mod: INF

## 2024-08-27 PROCEDURE — 84153 ASSAY OF PSA TOTAL: CPT

## 2024-08-27 PROCEDURE — 82306 VITAMIN D 25 HYDROXY: CPT

## 2024-08-27 RX ORDER — DIPHENHYDRAMINE HYDROCHLORIDE 50 MG/ML
50 INJECTION INTRAMUSCULAR; INTRAVENOUS AS NEEDED
Status: DISCONTINUED | OUTPATIENT
Start: 2024-08-27 | End: 2024-08-27 | Stop reason: HOSPADM

## 2024-08-27 RX ORDER — FAMOTIDINE 10 MG/ML
20 INJECTION INTRAVENOUS ONCE AS NEEDED
Status: DISCONTINUED | OUTPATIENT
Start: 2024-08-27 | End: 2024-08-27 | Stop reason: HOSPADM

## 2024-08-27 RX ORDER — PROCHLORPERAZINE EDISYLATE 5 MG/ML
10 INJECTION INTRAMUSCULAR; INTRAVENOUS EVERY 6 HOURS PRN
Status: DISCONTINUED | OUTPATIENT
Start: 2024-08-27 | End: 2024-08-27 | Stop reason: HOSPADM

## 2024-08-27 RX ORDER — ALBUTEROL SULFATE 0.83 MG/ML
3 SOLUTION RESPIRATORY (INHALATION) AS NEEDED
Status: DISCONTINUED | OUTPATIENT
Start: 2024-08-27 | End: 2024-08-27 | Stop reason: HOSPADM

## 2024-08-27 RX ORDER — EPINEPHRINE 0.3 MG/.3ML
0.3 INJECTION SUBCUTANEOUS EVERY 5 MIN PRN
Status: DISCONTINUED | OUTPATIENT
Start: 2024-08-27 | End: 2024-08-27 | Stop reason: HOSPADM

## 2024-08-27 RX ORDER — LORAZEPAM 0.5 MG/1
0.5 TABLET ORAL ONCE
Status: COMPLETED | OUTPATIENT
Start: 2024-08-27 | End: 2024-08-27

## 2024-08-27 RX ORDER — PROCHLORPERAZINE MALEATE 10 MG
10 TABLET ORAL EVERY 6 HOURS PRN
Status: DISCONTINUED | OUTPATIENT
Start: 2024-08-27 | End: 2024-08-27 | Stop reason: HOSPADM

## 2024-08-27 SDOH — ECONOMIC STABILITY: FOOD INSECURITY: WITHIN THE PAST 12 MONTHS, YOU WORRIED THAT YOUR FOOD WOULD RUN OUT BEFORE YOU GOT MONEY TO BUY MORE.: NEVER TRUE

## 2024-08-27 SDOH — ECONOMIC STABILITY: FOOD INSECURITY: WITHIN THE PAST 12 MONTHS, THE FOOD YOU BOUGHT JUST DIDN'T LAST AND YOU DIDN'T HAVE MONEY TO GET MORE.: NEVER TRUE

## 2024-08-27 ASSESSMENT — PAIN SCALES - GENERAL: PAINLEVEL: 0-NO PAIN

## 2024-08-27 NOTE — PATIENT INSTRUCTIONS
Today you visited with your Oncologist, Dr. Retana.  You will continue on your current treatment plan. Dr. Retana would like to see you again in mid-November with you treatment. Scheduling orders were placed to reflect this conversation.  Please don't hesitate to contact our office should you have any further questions or concerns, [phone]. Thank you.

## 2024-08-27 NOTE — SIGNIFICANT EVENT

## 2024-08-27 NOTE — PROGRESS NOTES
Patient ID: Jaosn Burns is a 63 y.o. male.  Referring Physician: Kelvin Retana MD  [id number] [place] [address]  Primary Care Provider: Karen Colin DO  Visit Type:  Follow Up     Subjective    HPI  Cancer History:   Treatment Synopsis:    #1) s/p R nephrectomy for a T3 (10 cm) renal cell carcinoma (sarcomatoid and epitheliod features) MAY 2018.  #2) DM, HTN        History of Present Illness:   Completed SBRT to 50Gy in 5fx to the soft tissue recurrence (5.6cm in max diameter at that time), with COT on 05/19/2023. Patient presents for follow up and treatment. On monthly Opdivo, tolerating well.   Review of Systems:    Constitutional:  No fever, chills, anorexia, or weight loss  Eyes:  No blurry vision, diplopia, or vision loss  ENT:  No nasal congestion, earache, or sore throat  Respiratory:  No cough, SOB, hemoptysis or wheezing  Cardiac:  No chest pain, palpitations, dyspnea on exertion, or orthopnea  Gastrointestinal:  No abdominal pain, nausea, vomiting, diarrhea, melena, hemoptysis, or hematochezia  Genitourinary:  No dysuria, hematuria, frequency, urgency, or flank pain  Musculoskeletal:  No arthralgia, myalgia, or weakness  Neurological:  No dizziness, light-headedness, headache, or syncope  Psychiatric:  No history of anxiety, depression, hallucinations  Skin:  No rash or pruritis  Endocrine:  No heat or cold intolerance, polyuria, or polydipsia  Hematologic:  No bruising      Family history is negative for first-degree relatives with cancer including breast, ovarian, colon or hematologic malignancies.   Review of Systems   Constitutional: Negative.    HENT:  Negative.     Respiratory: Negative.     Cardiovascular: Negative.    Gastrointestinal: Negative.         Objective   BSA: 2.45 meters squared  BP (!) 152/92 (BP Location: Left arm, Patient Position: Sitting, BP Cuff Size: Large adult)   Pulse 76   Temp 36.2 °C (97.2 °F) (Temporal)   Resp 17   Wt  122 kg (269 lb 4.7 oz)   SpO2 96%   BMI 38.64 kg/m²      has a past medical history of Campylobacter enteritis (11/02/2018), Personal history of other diseases of the digestive system (08/11/2016), Plantar fascial fibromatosis (08/19/2015), Renal cell cancer (Multi), and Unspecified sprain of unspecified great toe, initial encounter (08/19/2015).   has a past surgical history that includes Colonoscopy (10/21/2013); Umbilical hernia repair (04/25/2018); US guided needle liver biopsy (10/12/2020); CT angio abdomen pelvis w and or wo IV IV contrast (2/7/2021); and CT angio abdomen pelvis w and or wo IV IV contrast (2/10/2021).  Family History   Problem Relation Name Age of Onset    Pancreatic cancer Brother Jed      Oncology History   Renal cell carcinoma of right kidney (Multi)   10/22/2023 Initial Diagnosis    Renal cell carcinoma of right kidney (CMS/HCC)     10/24/2023 -  Chemotherapy    Nivolumab 480 mg, 28 Day Cycles         Jason Burns  reports that he has never smoked. He has never used smokeless tobacco.  He  reports current alcohol use.  He  reports that he does not currently use drugs.    Physical Exam  Constitutional:       Appearance: Normal appearance.   HENT:      Head: Normocephalic and atraumatic.   Eyes:      Extraocular Movements: Extraocular movements intact.      Pupils: Pupils are equal, round, and reactive to light.   Abdominal:      General: Abdomen is flat.      Palpations: Abdomen is soft.   Neurological:      Mental Status: He is alert.         WBC   Date/Time Value Ref Range Status   07/30/2024 09:41 AM 5.6 4.4 - 11.3 x10*3/uL Final   07/02/2024 09:45 AM 5.5 4.4 - 11.3 x10*3/uL Final   06/04/2024 09:04 AM 5.3 4.4 - 11.3 x10*3/uL Final     nRBC   Date Value Ref Range Status   11/21/2023 0.0 0.0 - 0.0 /100 WBCs Final   08/29/2023 CANCELED 0.0 - 0.0 /100 WBC      Comment:     Result canceled by the ancillary.   08/23/2023 CANCELED       Comment:     Result canceled by the  ancillary.   05/19/2023 CANCELED       Comment:     Result canceled by the ancillary.     RBC   Date Value Ref Range Status   07/30/2024 5.15 4.50 - 5.90 x10*6/uL Final   07/02/2024 5.03 4.50 - 5.90 x10*6/uL Final   06/04/2024 5.33 4.50 - 5.90 x10*6/uL Final     Hemoglobin   Date Value Ref Range Status   07/30/2024 15.2 13.5 - 17.5 g/dL Final   07/02/2024 15.0 13.5 - 17.5 g/dL Final   06/04/2024 15.8 13.5 - 17.5 g/dL Final     Hematocrit   Date Value Ref Range Status   07/30/2024 45.3 41.0 - 52.0 % Final   07/02/2024 44.4 41.0 - 52.0 % Final   06/04/2024 46.7 41.0 - 52.0 % Final     MCV   Date/Time Value Ref Range Status   07/30/2024 09:41 AM 88 80 - 100 fL Final   07/02/2024 09:45 AM 88 80 - 100 fL Final   06/04/2024 09:04 AM 88 80 - 100 fL Final     MCH   Date/Time Value Ref Range Status   07/30/2024 09:41 AM 29.5 26.0 - 34.0 pg Final   07/02/2024 09:45 AM 29.8 26.0 - 34.0 pg Final   06/04/2024 09:04 AM 29.6 26.0 - 34.0 pg Final     MCHC   Date/Time Value Ref Range Status   07/30/2024 09:41 AM 33.6 32.0 - 36.0 g/dL Final   07/02/2024 09:45 AM 33.8 32.0 - 36.0 g/dL Final   06/04/2024 09:04 AM 33.8 32.0 - 36.0 g/dL Final     RDW   Date/Time Value Ref Range Status   07/30/2024 09:41 AM 12.6 11.5 - 14.5 % Final   07/02/2024 09:45 AM 12.6 11.5 - 14.5 % Final   06/04/2024 09:04 AM 12.4 11.5 - 14.5 % Final     Platelets   Date/Time Value Ref Range Status   07/30/2024 09:41  150 - 450 x10*3/uL Final   07/02/2024 09:45  150 - 450 x10*3/uL Final   06/04/2024 09:04  150 - 450 x10*3/uL Final     MPV   Date/Time Value Ref Range Status   10/24/2023 02:34 PM 9.6 7.5 - 11.5 fL Final     Neutrophils %   Date/Time Value Ref Range Status   07/30/2024 09:41 AM 69.2 40.0 - 80.0 % Final   07/02/2024 09:45 AM 73.2 40.0 - 80.0 % Final   06/04/2024 09:04 AM 66.9 40.0 - 80.0 % Final     Immature Granulocytes %, Automated   Date/Time Value Ref Range Status   07/30/2024 09:41 AM 0.4 0.0 - 0.9 % Final     Comment:      Immature Granulocyte Count (IG) includes promyelocytes, myelocytes and metamyelocytes but does not include bands. Percent differential counts (%) should be interpreted in the context of the absolute cell counts (cells/UL).   07/02/2024 09:45 AM 0.0 0.0 - 0.9 % Final     Comment:     Immature Granulocyte Count (IG) includes promyelocytes, myelocytes and metamyelocytes but does not include bands. Percent differential counts (%) should be interpreted in the context of the absolute cell counts (cells/UL).   06/04/2024 09:04 AM 0.4 0.0 - 0.9 % Final     Comment:     Immature Granulocyte Count (IG) includes promyelocytes, myelocytes and metamyelocytes but does not include bands. Percent differential counts (%) should be interpreted in the context of the absolute cell counts (cells/UL).     Lymphocytes %   Date/Time Value Ref Range Status   07/30/2024 09:41 AM 18.8 13.0 - 44.0 % Final   07/02/2024 09:45 AM 17.2 13.0 - 44.0 % Final   06/04/2024 09:04 AM 19.4 13.0 - 44.0 % Final     Monocytes %   Date/Time Value Ref Range Status   07/30/2024 09:41 AM 6.6 2.0 - 10.0 % Final   07/02/2024 09:45 AM 6.0 2.0 - 10.0 % Final   06/04/2024 09:04 AM 7.2 2.0 - 10.0 % Final     Eosinophils %   Date/Time Value Ref Range Status   07/30/2024 09:41 AM 4.3 0.0 - 6.0 % Final   07/02/2024 09:45 AM 3.1 0.0 - 6.0 % Final   06/04/2024 09:04 AM 5.3 0.0 - 6.0 % Final     Basophils %   Date/Time Value Ref Range Status   07/30/2024 09:41 AM 0.7 0.0 - 2.0 % Final   07/02/2024 09:45 AM 0.5 0.0 - 2.0 % Final   06/04/2024 09:04 AM 0.8 0.0 - 2.0 % Final     Neutrophils Absolute   Date/Time Value Ref Range Status   07/30/2024 09:41 AM 3.90 1.20 - 7.70 x10*3/uL Final     Comment:     Percent differential counts (%) should be interpreted in the context of the absolute cell counts (cells/uL).   07/02/2024 09:45 AM 4.01 1.20 - 7.70 x10*3/uL Final     Comment:     Percent differential counts (%) should be interpreted in the context of the absolute cell counts  "(cells/uL).   06/04/2024 09:04 AM 3.51 1.20 - 7.70 x10*3/uL Final     Comment:     Percent differential counts (%) should be interpreted in the context of the absolute cell counts (cells/uL).     Immature Granulocytes Absolute, Automated   Date/Time Value Ref Range Status   07/30/2024 09:41 AM 0.02 0.00 - 0.70 x10*3/uL Final   07/02/2024 09:45 AM 0.00 0.00 - 0.70 x10*3/uL Final   06/04/2024 09:04 AM 0.02 0.00 - 0.70 x10*3/uL Final     Lymphocytes Absolute   Date/Time Value Ref Range Status   07/30/2024 09:41 AM 1.06 (L) 1.20 - 4.80 x10*3/uL Final   07/02/2024 09:45 AM 0.94 (L) 1.20 - 4.80 x10*3/uL Final   06/04/2024 09:04 AM 1.02 (L) 1.20 - 4.80 x10*3/uL Final     Monocytes Absolute   Date/Time Value Ref Range Status   07/30/2024 09:41 AM 0.37 0.10 - 1.00 x10*3/uL Final   07/02/2024 09:45 AM 0.33 0.10 - 1.00 x10*3/uL Final   06/04/2024 09:04 AM 0.38 0.10 - 1.00 x10*3/uL Final     Eosinophils Absolute   Date/Time Value Ref Range Status   07/30/2024 09:41 AM 0.24 0.00 - 0.70 x10*3/uL Final   07/02/2024 09:45 AM 0.17 0.00 - 0.70 x10*3/uL Final   06/04/2024 09:04 AM 0.28 0.00 - 0.70 x10*3/uL Final     Basophils Absolute   Date/Time Value Ref Range Status   07/30/2024 09:41 AM 0.04 0.00 - 0.10 x10*3/uL Final   07/02/2024 09:45 AM 0.03 0.00 - 0.10 x10*3/uL Final   06/04/2024 09:04 AM 0.04 0.00 - 0.10 x10*3/uL Final       No components found for: \"PT\"  aPTT   Date/Time Value Ref Range Status   01/03/2022 06:26 PM 30 26 - 39 sec Final     Comment:     Note new reference range as of 11/30/2021 at 10:00am.   12/29/2021 02:13 AM 26 26 - 39 sec Final     Comment:     Note new reference range as of 11/30/2021 at 10:00am.   12/16/2021 10:11 AM 32 26 - 39 sec Final     Comment:     Note new reference range as of 11/30/2021 at 10:00am.       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 Excellent response to 4 cycles of Ipi/Nivo. Course was complicated pneumonitis that has resolved. As well as occult " GI bleed, unknown etiology, resolved.     Now on maintenance Opdivo doing well. CT in 10/2021 showed interval increase in size of a retroperitoneal soft tissue mass at the right nephrectomy bed currently measuring 24 x 29 mm. MRI abd 12/2021 showed enlarging R hepatic lobe and R pericolonic metastases.     S/P surgical resection of residual disease in liver and in nephrectomy bed in 12/2021. Path c/w residual disease retroperitoneal lymph nodes and liver.     Continue Opdivo Monthly.      3/2023:  CT scan noting oligometastatic disease in the right lower quadrant site of nephrectomy. Seen by rad onc and awaiting treatment.     5/9/2023:  Plan patient to continue Opdivo q. 28 days. Noted to have mild hypercalcemia. Plan for hydration and lab check next week. Will also obtain bone scan.     06/06/2023 patient showing oligometastatic disease.  Right lower quadrant site of nephrectomy.  Patient is status post radiation to the surgical bed with very good effect.  He is to continue Opdivo q. 28 days scheduled today.  Cleared for therapy.     09/26/2023 patient presents today the results of his radiographic imaging indicating a response to treatment reduction in size no new lesions.  This has been explained to patient.  He will continue Opdivo q. 28 daily.  He has no complaints.     12/19/2023: Continue Opdivo every 4 weeks.    6/4/2024: CT CAP:IMPRESSION:  Sarcomatoid carcinoma of kidney restaging scan.  1. No evidence of metastatic disease identified in the chest. 2. Compared to prior CT dated 02/09/2024, there has been no significant interval change in the size of the soft tissue mesenteric implant in the right hemiabdomen with associated postradiation  changes.  3. Redemonstration of postsurgical changes of right posterior partial hepatectomy and right total nephrectomy, without interval increase in the size of small implants in the surgical bed.  4. No evidence of increased abdominopelvic lymphadenopathy.  5. Stable  aneurysmal dilatation of the ascending aorta up to 4.6 cm.  6. Additional findings as above.       Signed by: Adrián Bowling 5/9/2024 8/27/2024:  CT CAP: IMPRESSION:   CHEST:   1. No intrathoracic metastatic disease.   2. Stable appearance of the aneurysmal dilatation of the ascending   thoracic aorta measuring 4.6 cm.   3. Stable appearance of the thyroid gland with multiple partially   calcific nodules.       ABDOMEN-PELVIS:   1. Status post radical right nephrectomy/adrenalectomy and partial   right posterior hepatectomy with a stable few subcentimeter tiny   nodules at the surgical bed posteriorly lateral to the right psoas   muscle measuring up to 0.7 cm.   2. Postradiation changes in the right hemiabdomen with a stable few   mesenteric nodules measuring up to 1.3 cm.   3. No new concerning findings in the abdomen or pelvis.        MACRO:   None      Signed by: Sai Paredes 8/13/2024   CT scan as stated above does not show any evidence of disease recurrence.  Will continue Opdivo patient is tolerating it well with no complications or adverse effects.  Return to clinic in 4 months.     Diagnoses and all orders for this visit:  Renal cell carcinoma of right kidney (Multi)  -     Clinic Appointment Request Follow up           Kelvin Retana MD

## 2024-08-29 ASSESSMENT — ENCOUNTER SYMPTOMS
CONSTITUTIONAL NEGATIVE: 1
CARDIOVASCULAR NEGATIVE: 1
RESPIRATORY NEGATIVE: 1
GASTROINTESTINAL NEGATIVE: 1

## 2024-08-30 LAB — ACTH PLAS-MCNC: 76.3 PG/ML (ref 7.2–63.3)

## 2024-09-24 ENCOUNTER — INFUSION (OUTPATIENT)
Dept: HEMATOLOGY/ONCOLOGY | Facility: CLINIC | Age: 64
End: 2024-09-24
Payer: COMMERCIAL

## 2024-09-24 ENCOUNTER — APPOINTMENT (OUTPATIENT)
Dept: HEMATOLOGY/ONCOLOGY | Facility: CLINIC | Age: 64
End: 2024-09-24
Payer: COMMERCIAL

## 2024-09-24 VITALS
DIASTOLIC BLOOD PRESSURE: 88 MMHG | HEIGHT: 69 IN | WEIGHT: 270.5 LBS | OXYGEN SATURATION: 96 % | HEART RATE: 70 BPM | BODY MASS INDEX: 40.07 KG/M2 | RESPIRATION RATE: 16 BRPM | SYSTOLIC BLOOD PRESSURE: 160 MMHG | TEMPERATURE: 97.9 F

## 2024-09-24 DIAGNOSIS — C64.1 RENAL CELL CARCINOMA OF RIGHT KIDNEY (MULTI): ICD-10-CM

## 2024-09-24 LAB
ALBUMIN SERPL BCP-MCNC: 3.9 G/DL (ref 3.4–5)
ALP SERPL-CCNC: 55 U/L (ref 33–136)
ALT SERPL W P-5'-P-CCNC: 17 U/L (ref 10–52)
ANION GAP SERPL CALC-SCNC: 11 MMOL/L (ref 10–20)
AST SERPL W P-5'-P-CCNC: 14 U/L (ref 9–39)
BASOPHILS # BLD AUTO: 0.06 X10*3/UL (ref 0–0.1)
BASOPHILS NFR BLD AUTO: 1.2 %
BILIRUB SERPL-MCNC: 0.4 MG/DL (ref 0–1.2)
BUN SERPL-MCNC: 14 MG/DL (ref 6–23)
CALCIUM SERPL-MCNC: 9.1 MG/DL (ref 8.6–10.3)
CHLORIDE SERPL-SCNC: 104 MMOL/L (ref 98–107)
CO2 SERPL-SCNC: 30 MMOL/L (ref 21–32)
CORTIS AM PEAK SERPL-MSCNC: 12.3 UG/DL (ref 5–20)
CREAT SERPL-MCNC: 1.44 MG/DL (ref 0.5–1.3)
EGFRCR SERPLBLD CKD-EPI 2021: 55 ML/MIN/1.73M*2
EOSINOPHIL # BLD AUTO: 0.25 X10*3/UL (ref 0–0.7)
EOSINOPHIL NFR BLD AUTO: 4.9 %
ERYTHROCYTE [DISTWIDTH] IN BLOOD BY AUTOMATED COUNT: 12.4 % (ref 11.5–14.5)
GLUCOSE SERPL-MCNC: 151 MG/DL (ref 74–99)
HCT VFR BLD AUTO: 44.7 % (ref 41–52)
HGB BLD-MCNC: 15.1 G/DL (ref 13.5–17.5)
IMM GRANULOCYTES # BLD AUTO: 0.01 X10*3/UL (ref 0–0.7)
IMM GRANULOCYTES NFR BLD AUTO: 0.2 % (ref 0–0.9)
LYMPHOCYTES # BLD AUTO: 1.04 X10*3/UL (ref 1.2–4.8)
LYMPHOCYTES NFR BLD AUTO: 20.4 %
MCH RBC QN AUTO: 29.7 PG (ref 26–34)
MCHC RBC AUTO-ENTMCNC: 33.8 G/DL (ref 32–36)
MCV RBC AUTO: 88 FL (ref 80–100)
MONOCYTES # BLD AUTO: 0.41 X10*3/UL (ref 0.1–1)
MONOCYTES NFR BLD AUTO: 8 %
NEUTROPHILS # BLD AUTO: 3.34 X10*3/UL (ref 1.2–7.7)
NEUTROPHILS NFR BLD AUTO: 65.3 %
PLATELET # BLD AUTO: 187 X10*3/UL (ref 150–450)
POTASSIUM SERPL-SCNC: 4.5 MMOL/L (ref 3.5–5.3)
PROT SERPL-MCNC: 6.5 G/DL (ref 6.4–8.2)
RBC # BLD AUTO: 5.09 X10*6/UL (ref 4.5–5.9)
SODIUM SERPL-SCNC: 140 MMOL/L (ref 136–145)
TSH SERPL-ACNC: 0.54 MIU/L (ref 0.44–3.98)
WBC # BLD AUTO: 5.1 X10*3/UL (ref 4.4–11.3)

## 2024-09-24 PROCEDURE — 84443 ASSAY THYROID STIM HORMONE: CPT

## 2024-09-24 PROCEDURE — 96413 CHEMO IV INFUSION 1 HR: CPT

## 2024-09-24 PROCEDURE — 84075 ASSAY ALKALINE PHOSPHATASE: CPT

## 2024-09-24 PROCEDURE — 2500000004 HC RX 250 GENERAL PHARMACY W/ HCPCS (ALT 636 FOR OP/ED): Performed by: INTERNAL MEDICINE

## 2024-09-24 PROCEDURE — 85025 COMPLETE CBC W/AUTO DIFF WBC: CPT

## 2024-09-24 PROCEDURE — 82533 TOTAL CORTISOL: CPT | Mod: GEALAB

## 2024-09-24 PROCEDURE — 96375 TX/PRO/DX INJ NEW DRUG ADDON: CPT | Mod: INF

## 2024-09-24 PROCEDURE — 2500000001 HC RX 250 WO HCPCS SELF ADMINISTERED DRUGS (ALT 637 FOR MEDICARE OP): Performed by: INTERNAL MEDICINE

## 2024-09-24 PROCEDURE — 82024 ASSAY OF ACTH: CPT

## 2024-09-24 RX ORDER — ALBUTEROL SULFATE 0.83 MG/ML
3 SOLUTION RESPIRATORY (INHALATION) AS NEEDED
Status: DISCONTINUED | OUTPATIENT
Start: 2024-09-24 | End: 2024-09-24 | Stop reason: HOSPADM

## 2024-09-24 RX ORDER — PROCHLORPERAZINE EDISYLATE 5 MG/ML
10 INJECTION INTRAMUSCULAR; INTRAVENOUS EVERY 6 HOURS PRN
Status: DISCONTINUED | OUTPATIENT
Start: 2024-09-24 | End: 2024-09-24 | Stop reason: HOSPADM

## 2024-09-24 RX ORDER — FAMOTIDINE 10 MG/ML
20 INJECTION INTRAVENOUS ONCE AS NEEDED
Status: DISCONTINUED | OUTPATIENT
Start: 2024-09-24 | End: 2024-09-24 | Stop reason: HOSPADM

## 2024-09-24 RX ORDER — HEPARIN 100 UNIT/ML
500 SYRINGE INTRAVENOUS AS NEEDED
OUTPATIENT
Start: 2024-09-24

## 2024-09-24 RX ORDER — LORAZEPAM 0.5 MG/1
0.5 TABLET ORAL ONCE
Status: COMPLETED | OUTPATIENT
Start: 2024-09-24 | End: 2024-09-24

## 2024-09-24 RX ORDER — PROCHLORPERAZINE MALEATE 10 MG
10 TABLET ORAL EVERY 6 HOURS PRN
Status: DISCONTINUED | OUTPATIENT
Start: 2024-09-24 | End: 2024-09-24 | Stop reason: HOSPADM

## 2024-09-24 RX ORDER — HEPARIN SODIUM,PORCINE/PF 10 UNIT/ML
50 SYRINGE (ML) INTRAVENOUS AS NEEDED
OUTPATIENT
Start: 2024-09-24

## 2024-09-24 RX ORDER — HEPARIN 100 UNIT/ML
500 SYRINGE INTRAVENOUS AS NEEDED
Status: DISCONTINUED | OUTPATIENT
Start: 2024-09-24 | End: 2024-09-24 | Stop reason: HOSPADM

## 2024-09-24 RX ORDER — DIPHENHYDRAMINE HYDROCHLORIDE 50 MG/ML
50 INJECTION INTRAMUSCULAR; INTRAVENOUS AS NEEDED
Status: DISCONTINUED | OUTPATIENT
Start: 2024-09-24 | End: 2024-09-24 | Stop reason: HOSPADM

## 2024-09-24 RX ORDER — HEPARIN SODIUM,PORCINE/PF 10 UNIT/ML
50 SYRINGE (ML) INTRAVENOUS AS NEEDED
Status: DISCONTINUED | OUTPATIENT
Start: 2024-09-24 | End: 2024-09-24 | Stop reason: HOSPADM

## 2024-09-24 RX ORDER — EPINEPHRINE 0.3 MG/.3ML
0.3 INJECTION SUBCUTANEOUS EVERY 5 MIN PRN
Status: DISCONTINUED | OUTPATIENT
Start: 2024-09-24 | End: 2024-09-24 | Stop reason: HOSPADM

## 2024-09-24 ASSESSMENT — PAIN SCALES - GENERAL: PAINLEVEL: 0-NO PAIN

## 2024-09-24 NOTE — PROGRESS NOTES
Patient recieved Nivolumab Infusion with no s/s of reaction or complication. Gave and reviewed lab results and schedule with patient. Patient left infusion independently in stable condition.

## 2024-09-26 LAB — ACTH PLAS-MCNC: 69.3 PG/ML (ref 7.2–63.3)

## 2024-10-22 ENCOUNTER — INFUSION (OUTPATIENT)
Dept: HEMATOLOGY/ONCOLOGY | Facility: CLINIC | Age: 64
End: 2024-10-22
Payer: COMMERCIAL

## 2024-10-22 ENCOUNTER — APPOINTMENT (OUTPATIENT)
Dept: HEMATOLOGY/ONCOLOGY | Facility: CLINIC | Age: 64
End: 2024-10-22
Payer: COMMERCIAL

## 2024-10-22 VITALS
DIASTOLIC BLOOD PRESSURE: 91 MMHG | WEIGHT: 267.2 LBS | OXYGEN SATURATION: 95 % | BODY MASS INDEX: 38.91 KG/M2 | TEMPERATURE: 97.3 F | HEART RATE: 71 BPM | SYSTOLIC BLOOD PRESSURE: 152 MMHG | RESPIRATION RATE: 16 BRPM

## 2024-10-22 DIAGNOSIS — C64.1 RENAL CELL CARCINOMA OF RIGHT KIDNEY (MULTI): ICD-10-CM

## 2024-10-22 LAB
ALBUMIN SERPL BCP-MCNC: 4.1 G/DL (ref 3.4–5)
ALP SERPL-CCNC: 60 U/L (ref 33–136)
ALT SERPL W P-5'-P-CCNC: 15 U/L (ref 10–52)
ANION GAP SERPL CALC-SCNC: 14 MMOL/L (ref 10–20)
AST SERPL W P-5'-P-CCNC: 16 U/L (ref 9–39)
BASOPHILS # BLD AUTO: 0.04 X10*3/UL (ref 0–0.1)
BASOPHILS NFR BLD AUTO: 0.7 %
BILIRUB SERPL-MCNC: 0.6 MG/DL (ref 0–1.2)
BUN SERPL-MCNC: 17 MG/DL (ref 6–23)
CALCIUM SERPL-MCNC: 9.1 MG/DL (ref 8.6–10.3)
CHLORIDE SERPL-SCNC: 104 MMOL/L (ref 98–107)
CO2 SERPL-SCNC: 26 MMOL/L (ref 21–32)
CORTIS AM PEAK SERPL-MSCNC: 14.1 UG/DL (ref 5–20)
CREAT SERPL-MCNC: 1.45 MG/DL (ref 0.5–1.3)
EGFRCR SERPLBLD CKD-EPI 2021: 54 ML/MIN/1.73M*2
EOSINOPHIL # BLD AUTO: 0.2 X10*3/UL (ref 0–0.7)
EOSINOPHIL NFR BLD AUTO: 3.6 %
ERYTHROCYTE [DISTWIDTH] IN BLOOD BY AUTOMATED COUNT: 12.7 % (ref 11.5–14.5)
GLUCOSE SERPL-MCNC: 121 MG/DL (ref 74–99)
HCT VFR BLD AUTO: 46 % (ref 41–52)
HGB BLD-MCNC: 15.4 G/DL (ref 13.5–17.5)
IMM GRANULOCYTES # BLD AUTO: 0.01 X10*3/UL (ref 0–0.7)
IMM GRANULOCYTES NFR BLD AUTO: 0.2 % (ref 0–0.9)
LYMPHOCYTES # BLD AUTO: 1.12 X10*3/UL (ref 1.2–4.8)
LYMPHOCYTES NFR BLD AUTO: 19.9 %
MCH RBC QN AUTO: 29.6 PG (ref 26–34)
MCHC RBC AUTO-ENTMCNC: 33.5 G/DL (ref 32–36)
MCV RBC AUTO: 89 FL (ref 80–100)
MONOCYTES # BLD AUTO: 0.41 X10*3/UL (ref 0.1–1)
MONOCYTES NFR BLD AUTO: 7.3 %
NEUTROPHILS # BLD AUTO: 3.84 X10*3/UL (ref 1.2–7.7)
NEUTROPHILS NFR BLD AUTO: 68.3 %
PLATELET # BLD AUTO: 178 X10*3/UL (ref 150–450)
POTASSIUM SERPL-SCNC: 3.9 MMOL/L (ref 3.5–5.3)
PROT SERPL-MCNC: 7.2 G/DL (ref 6.4–8.2)
RBC # BLD AUTO: 5.2 X10*6/UL (ref 4.5–5.9)
SODIUM SERPL-SCNC: 140 MMOL/L (ref 136–145)
TSH SERPL-ACNC: 0.48 MIU/L (ref 0.44–3.98)
WBC # BLD AUTO: 5.6 X10*3/UL (ref 4.4–11.3)

## 2024-10-22 PROCEDURE — 85025 COMPLETE CBC W/AUTO DIFF WBC: CPT

## 2024-10-22 PROCEDURE — 2500000004 HC RX 250 GENERAL PHARMACY W/ HCPCS (ALT 636 FOR OP/ED): Performed by: INTERNAL MEDICINE

## 2024-10-22 PROCEDURE — 82024 ASSAY OF ACTH: CPT

## 2024-10-22 PROCEDURE — 82533 TOTAL CORTISOL: CPT | Mod: GEALAB

## 2024-10-22 PROCEDURE — 84443 ASSAY THYROID STIM HORMONE: CPT

## 2024-10-22 PROCEDURE — 84075 ASSAY ALKALINE PHOSPHATASE: CPT

## 2024-10-22 PROCEDURE — 96413 CHEMO IV INFUSION 1 HR: CPT

## 2024-10-22 PROCEDURE — 2500000001 HC RX 250 WO HCPCS SELF ADMINISTERED DRUGS (ALT 637 FOR MEDICARE OP): Performed by: INTERNAL MEDICINE

## 2024-10-22 PROCEDURE — 96375 TX/PRO/DX INJ NEW DRUG ADDON: CPT | Mod: INF

## 2024-10-22 RX ORDER — DIPHENHYDRAMINE HYDROCHLORIDE 50 MG/ML
50 INJECTION INTRAMUSCULAR; INTRAVENOUS AS NEEDED
Status: DISCONTINUED | OUTPATIENT
Start: 2024-10-22 | End: 2024-10-22 | Stop reason: HOSPADM

## 2024-10-22 RX ORDER — EPINEPHRINE 0.3 MG/.3ML
0.3 INJECTION SUBCUTANEOUS EVERY 5 MIN PRN
Status: DISCONTINUED | OUTPATIENT
Start: 2024-10-22 | End: 2024-10-22 | Stop reason: HOSPADM

## 2024-10-22 RX ORDER — FAMOTIDINE 10 MG/ML
20 INJECTION INTRAVENOUS ONCE AS NEEDED
Status: DISCONTINUED | OUTPATIENT
Start: 2024-10-22 | End: 2024-10-22 | Stop reason: HOSPADM

## 2024-10-22 RX ORDER — LORAZEPAM 0.5 MG/1
0.5 TABLET ORAL ONCE
Status: COMPLETED | OUTPATIENT
Start: 2024-10-22 | End: 2024-10-22

## 2024-10-22 RX ORDER — PROCHLORPERAZINE MALEATE 10 MG
10 TABLET ORAL EVERY 6 HOURS PRN
Status: DISCONTINUED | OUTPATIENT
Start: 2024-10-22 | End: 2024-10-22 | Stop reason: HOSPADM

## 2024-10-22 RX ORDER — ALBUTEROL SULFATE 0.83 MG/ML
3 SOLUTION RESPIRATORY (INHALATION) AS NEEDED
Status: DISCONTINUED | OUTPATIENT
Start: 2024-10-22 | End: 2024-10-22 | Stop reason: HOSPADM

## 2024-10-22 RX ORDER — PROCHLORPERAZINE EDISYLATE 5 MG/ML
10 INJECTION INTRAMUSCULAR; INTRAVENOUS EVERY 6 HOURS PRN
Status: DISCONTINUED | OUTPATIENT
Start: 2024-10-22 | End: 2024-10-22 | Stop reason: HOSPADM

## 2024-10-22 ASSESSMENT — PAIN SCALES - GENERAL: PAINLEVEL_OUTOF10: 0-NO PAIN

## 2024-10-22 NOTE — SIGNIFICANT EVENT

## 2024-10-24 LAB — ACTH PLAS-MCNC: 70.1 PG/ML (ref 7.2–63.3)

## 2024-11-18 ENCOUNTER — HOSPITAL ENCOUNTER (OUTPATIENT)
Dept: RADIOLOGY | Facility: HOSPITAL | Age: 64
Discharge: HOME | End: 2024-11-18
Payer: COMMERCIAL

## 2024-11-18 DIAGNOSIS — C64.1 RENAL CELL CARCINOMA OF RIGHT KIDNEY (MULTI): ICD-10-CM

## 2024-11-18 PROCEDURE — 2550000001 HC RX 255 CONTRASTS: Performed by: RADIOLOGY

## 2024-11-18 PROCEDURE — 74177 CT ABD & PELVIS W/CONTRAST: CPT | Performed by: RADIOLOGY

## 2024-11-18 PROCEDURE — 71260 CT THORAX DX C+: CPT | Performed by: RADIOLOGY

## 2024-11-18 PROCEDURE — 74177 CT ABD & PELVIS W/CONTRAST: CPT

## 2024-11-19 ENCOUNTER — INFUSION (OUTPATIENT)
Dept: HEMATOLOGY/ONCOLOGY | Facility: CLINIC | Age: 64
End: 2024-11-19
Payer: COMMERCIAL

## 2024-11-19 ENCOUNTER — OFFICE VISIT (OUTPATIENT)
Dept: HEMATOLOGY/ONCOLOGY | Facility: CLINIC | Age: 64
End: 2024-11-19
Payer: COMMERCIAL

## 2024-11-19 VITALS
WEIGHT: 265.43 LBS | HEART RATE: 70 BPM | TEMPERATURE: 97.5 F | DIASTOLIC BLOOD PRESSURE: 89 MMHG | SYSTOLIC BLOOD PRESSURE: 144 MMHG | OXYGEN SATURATION: 96 % | RESPIRATION RATE: 17 BRPM | BODY MASS INDEX: 38.65 KG/M2

## 2024-11-19 DIAGNOSIS — C64.1 RENAL CELL CARCINOMA OF RIGHT KIDNEY (MULTI): ICD-10-CM

## 2024-11-19 DIAGNOSIS — C64.1 RENAL CELL CARCINOMA OF RIGHT KIDNEY (MULTI): Primary | ICD-10-CM

## 2024-11-19 LAB
ALBUMIN SERPL BCP-MCNC: 4.3 G/DL (ref 3.4–5)
ALP SERPL-CCNC: 58 U/L (ref 33–136)
ALT SERPL W P-5'-P-CCNC: 16 U/L (ref 10–52)
ANION GAP SERPL CALC-SCNC: 11 MMOL/L (ref 10–20)
AST SERPL W P-5'-P-CCNC: 15 U/L (ref 9–39)
BASOPHILS # BLD AUTO: 0.04 X10*3/UL (ref 0–0.1)
BASOPHILS NFR BLD AUTO: 0.8 %
BILIRUB SERPL-MCNC: 0.6 MG/DL (ref 0–1.2)
BUN SERPL-MCNC: 15 MG/DL (ref 6–23)
CALCIUM SERPL-MCNC: 9.1 MG/DL (ref 8.6–10.3)
CHLORIDE SERPL-SCNC: 103 MMOL/L (ref 98–107)
CO2 SERPL-SCNC: 29 MMOL/L (ref 21–32)
CORTIS AM PEAK SERPL-MSCNC: 7.8 UG/DL (ref 5–20)
CREAT SERPL-MCNC: 1.34 MG/DL (ref 0.5–1.3)
EGFRCR SERPLBLD CKD-EPI 2021: 60 ML/MIN/1.73M*2
EOSINOPHIL # BLD AUTO: 0.19 X10*3/UL (ref 0–0.7)
EOSINOPHIL NFR BLD AUTO: 3.9 %
ERYTHROCYTE [DISTWIDTH] IN BLOOD BY AUTOMATED COUNT: 12.9 % (ref 11.5–14.5)
GLUCOSE SERPL-MCNC: 92 MG/DL (ref 74–99)
HCT VFR BLD AUTO: 47.3 % (ref 41–52)
HGB BLD-MCNC: 15.8 G/DL (ref 13.5–17.5)
IMM GRANULOCYTES # BLD AUTO: 0.01 X10*3/UL (ref 0–0.7)
IMM GRANULOCYTES NFR BLD AUTO: 0.2 % (ref 0–0.9)
LYMPHOCYTES # BLD AUTO: 1.06 X10*3/UL (ref 1.2–4.8)
LYMPHOCYTES NFR BLD AUTO: 21.9 %
MCH RBC QN AUTO: 29.5 PG (ref 26–34)
MCHC RBC AUTO-ENTMCNC: 33.4 G/DL (ref 32–36)
MCV RBC AUTO: 88 FL (ref 80–100)
MONOCYTES # BLD AUTO: 0.41 X10*3/UL (ref 0.1–1)
MONOCYTES NFR BLD AUTO: 8.5 %
NEUTROPHILS # BLD AUTO: 3.14 X10*3/UL (ref 1.2–7.7)
NEUTROPHILS NFR BLD AUTO: 64.7 %
PLATELET # BLD AUTO: 180 X10*3/UL (ref 150–450)
POTASSIUM SERPL-SCNC: 4 MMOL/L (ref 3.5–5.3)
PROT SERPL-MCNC: 7.1 G/DL (ref 6.4–8.2)
RBC # BLD AUTO: 5.36 X10*6/UL (ref 4.5–5.9)
SODIUM SERPL-SCNC: 139 MMOL/L (ref 136–145)
TSH SERPL-ACNC: 0.48 MIU/L (ref 0.44–3.98)
WBC # BLD AUTO: 4.9 X10*3/UL (ref 4.4–11.3)

## 2024-11-19 PROCEDURE — 84075 ASSAY ALKALINE PHOSPHATASE: CPT

## 2024-11-19 PROCEDURE — 84443 ASSAY THYROID STIM HORMONE: CPT

## 2024-11-19 PROCEDURE — 82533 TOTAL CORTISOL: CPT | Mod: GEALAB

## 2024-11-19 PROCEDURE — 4010F ACE/ARB THERAPY RXD/TAKEN: CPT | Performed by: INTERNAL MEDICINE

## 2024-11-19 PROCEDURE — 3079F DIAST BP 80-89 MM HG: CPT | Performed by: INTERNAL MEDICINE

## 2024-11-19 PROCEDURE — 3048F LDL-C <100 MG/DL: CPT | Performed by: INTERNAL MEDICINE

## 2024-11-19 PROCEDURE — 85025 COMPLETE CBC W/AUTO DIFF WBC: CPT

## 2024-11-19 PROCEDURE — 82024 ASSAY OF ACTH: CPT

## 2024-11-19 PROCEDURE — 2500000001 HC RX 250 WO HCPCS SELF ADMINISTERED DRUGS (ALT 637 FOR MEDICARE OP): Performed by: INTERNAL MEDICINE

## 2024-11-19 PROCEDURE — 2500000004 HC RX 250 GENERAL PHARMACY W/ HCPCS (ALT 636 FOR OP/ED): Mod: JZ | Performed by: INTERNAL MEDICINE

## 2024-11-19 PROCEDURE — 96375 TX/PRO/DX INJ NEW DRUG ADDON: CPT | Mod: INF

## 2024-11-19 PROCEDURE — 99215 OFFICE O/P EST HI 40 MIN: CPT | Performed by: INTERNAL MEDICINE

## 2024-11-19 PROCEDURE — 2500000004 HC RX 250 GENERAL PHARMACY W/ HCPCS (ALT 636 FOR OP/ED): Performed by: INTERNAL MEDICINE

## 2024-11-19 PROCEDURE — 99417 PROLNG OP E/M EACH 15 MIN: CPT | Performed by: INTERNAL MEDICINE

## 2024-11-19 PROCEDURE — 3052F HG A1C>EQUAL 8.0%<EQUAL 9.0%: CPT | Performed by: INTERNAL MEDICINE

## 2024-11-19 PROCEDURE — 3077F SYST BP >= 140 MM HG: CPT | Performed by: INTERNAL MEDICINE

## 2024-11-19 PROCEDURE — 96413 CHEMO IV INFUSION 1 HR: CPT

## 2024-11-19 RX ORDER — PROCHLORPERAZINE EDISYLATE 5 MG/ML
10 INJECTION INTRAMUSCULAR; INTRAVENOUS EVERY 6 HOURS PRN
Status: DISCONTINUED | OUTPATIENT
Start: 2024-11-19 | End: 2024-11-19 | Stop reason: HOSPADM

## 2024-11-19 RX ORDER — PROCHLORPERAZINE EDISYLATE 5 MG/ML
10 INJECTION INTRAMUSCULAR; INTRAVENOUS EVERY 6 HOURS PRN
OUTPATIENT
Start: 2025-01-21

## 2024-11-19 RX ORDER — EPINEPHRINE 0.3 MG/.3ML
0.3 INJECTION SUBCUTANEOUS EVERY 5 MIN PRN
Status: DISCONTINUED | OUTPATIENT
Start: 2024-11-19 | End: 2024-11-19 | Stop reason: HOSPADM

## 2024-11-19 RX ORDER — PROCHLORPERAZINE EDISYLATE 5 MG/ML
10 INJECTION INTRAMUSCULAR; INTRAVENOUS EVERY 6 HOURS PRN
OUTPATIENT
Start: 2025-03-04

## 2024-11-19 RX ORDER — PROCHLORPERAZINE EDISYLATE 5 MG/ML
10 INJECTION INTRAMUSCULAR; INTRAVENOUS EVERY 6 HOURS PRN
OUTPATIENT
Start: 2024-12-24

## 2024-11-19 RX ORDER — EPINEPHRINE 0.3 MG/.3ML
0.3 INJECTION SUBCUTANEOUS EVERY 5 MIN PRN
OUTPATIENT
Start: 2025-03-25

## 2024-11-19 RX ORDER — DIPHENHYDRAMINE HYDROCHLORIDE 50 MG/ML
50 INJECTION INTRAMUSCULAR; INTRAVENOUS AS NEEDED
OUTPATIENT
Start: 2025-01-21

## 2024-11-19 RX ORDER — DIPHENHYDRAMINE HYDROCHLORIDE 50 MG/ML
50 INJECTION INTRAMUSCULAR; INTRAVENOUS AS NEEDED
OUTPATIENT
Start: 2024-12-24

## 2024-11-19 RX ORDER — PROCHLORPERAZINE MALEATE 5 MG
10 TABLET ORAL EVERY 6 HOURS PRN
OUTPATIENT
Start: 2025-03-25

## 2024-11-19 RX ORDER — DIPHENHYDRAMINE HYDROCHLORIDE 50 MG/ML
50 INJECTION INTRAMUSCULAR; INTRAVENOUS AS NEEDED
Status: DISCONTINUED | OUTPATIENT
Start: 2024-11-19 | End: 2024-11-19 | Stop reason: HOSPADM

## 2024-11-19 RX ORDER — PROCHLORPERAZINE EDISYLATE 5 MG/ML
10 INJECTION INTRAMUSCULAR; INTRAVENOUS EVERY 6 HOURS PRN
OUTPATIENT
Start: 2025-03-25

## 2024-11-19 RX ORDER — PROCHLORPERAZINE MALEATE 5 MG
10 TABLET ORAL EVERY 6 HOURS PRN
OUTPATIENT
Start: 2025-01-21

## 2024-11-19 RX ORDER — ALBUTEROL SULFATE 0.83 MG/ML
3 SOLUTION RESPIRATORY (INHALATION) AS NEEDED
OUTPATIENT
Start: 2025-03-04

## 2024-11-19 RX ORDER — EPINEPHRINE 0.3 MG/.3ML
0.3 INJECTION SUBCUTANEOUS EVERY 5 MIN PRN
OUTPATIENT
Start: 2024-12-24

## 2024-11-19 RX ORDER — EPINEPHRINE 0.3 MG/.3ML
0.3 INJECTION SUBCUTANEOUS EVERY 5 MIN PRN
OUTPATIENT
Start: 2025-01-21

## 2024-11-19 RX ORDER — FAMOTIDINE 10 MG/ML
20 INJECTION INTRAVENOUS ONCE AS NEEDED
OUTPATIENT
Start: 2025-03-04

## 2024-11-19 RX ORDER — DIPHENHYDRAMINE HYDROCHLORIDE 50 MG/ML
50 INJECTION INTRAMUSCULAR; INTRAVENOUS AS NEEDED
OUTPATIENT
Start: 2025-03-04

## 2024-11-19 RX ORDER — ALBUTEROL SULFATE 0.83 MG/ML
3 SOLUTION RESPIRATORY (INHALATION) AS NEEDED
OUTPATIENT
Start: 2025-01-21

## 2024-11-19 RX ORDER — LORAZEPAM 0.5 MG/1
0.5 TABLET ORAL ONCE
Start: 2024-12-24 | End: 2024-12-24

## 2024-11-19 RX ORDER — FAMOTIDINE 10 MG/ML
20 INJECTION INTRAVENOUS ONCE AS NEEDED
OUTPATIENT
Start: 2025-01-21

## 2024-11-19 RX ORDER — ALBUTEROL SULFATE 0.83 MG/ML
3 SOLUTION RESPIRATORY (INHALATION) AS NEEDED
OUTPATIENT
Start: 2025-03-25

## 2024-11-19 RX ORDER — EPINEPHRINE 0.3 MG/.3ML
0.3 INJECTION SUBCUTANEOUS EVERY 5 MIN PRN
OUTPATIENT
Start: 2025-03-04

## 2024-11-19 RX ORDER — PROCHLORPERAZINE MALEATE 5 MG
10 TABLET ORAL EVERY 6 HOURS PRN
OUTPATIENT
Start: 2025-03-04

## 2024-11-19 RX ORDER — ALBUTEROL SULFATE 0.83 MG/ML
3 SOLUTION RESPIRATORY (INHALATION) AS NEEDED
OUTPATIENT
Start: 2024-12-24

## 2024-11-19 RX ORDER — FAMOTIDINE 10 MG/ML
20 INJECTION INTRAVENOUS ONCE AS NEEDED
OUTPATIENT
Start: 2025-03-25

## 2024-11-19 RX ORDER — FAMOTIDINE 10 MG/ML
20 INJECTION INTRAVENOUS ONCE AS NEEDED
OUTPATIENT
Start: 2024-12-24

## 2024-11-19 RX ORDER — LORAZEPAM 0.5 MG/1
0.5 TABLET ORAL ONCE
Start: 2025-03-25 | End: 2025-04-01

## 2024-11-19 RX ORDER — DIPHENHYDRAMINE HYDROCHLORIDE 50 MG/ML
50 INJECTION INTRAMUSCULAR; INTRAVENOUS AS NEEDED
OUTPATIENT
Start: 2025-03-25

## 2024-11-19 RX ORDER — LORAZEPAM 0.5 MG/1
0.5 TABLET ORAL ONCE
Status: COMPLETED | OUTPATIENT
Start: 2024-11-19 | End: 2024-11-19

## 2024-11-19 RX ORDER — PROCHLORPERAZINE MALEATE 5 MG
10 TABLET ORAL EVERY 6 HOURS PRN
OUTPATIENT
Start: 2024-12-24

## 2024-11-19 RX ORDER — LORAZEPAM 0.5 MG/1
0.5 TABLET ORAL ONCE
Start: 2025-03-04 | End: 2025-03-04

## 2024-11-19 RX ORDER — PROCHLORPERAZINE MALEATE 10 MG
10 TABLET ORAL EVERY 6 HOURS PRN
Status: DISCONTINUED | OUTPATIENT
Start: 2024-11-19 | End: 2024-11-19 | Stop reason: HOSPADM

## 2024-11-19 RX ORDER — ALBUTEROL SULFATE 0.83 MG/ML
3 SOLUTION RESPIRATORY (INHALATION) AS NEEDED
Status: DISCONTINUED | OUTPATIENT
Start: 2024-11-19 | End: 2024-11-19 | Stop reason: HOSPADM

## 2024-11-19 RX ORDER — FAMOTIDINE 10 MG/ML
20 INJECTION INTRAVENOUS ONCE AS NEEDED
Status: DISCONTINUED | OUTPATIENT
Start: 2024-11-19 | End: 2024-11-19 | Stop reason: HOSPADM

## 2024-11-19 RX ORDER — LORAZEPAM 0.5 MG/1
0.5 TABLET ORAL ONCE
Start: 2025-01-21 | End: 2025-01-21

## 2024-11-19 SDOH — ECONOMIC STABILITY: FOOD INSECURITY: WITHIN THE PAST 12 MONTHS, THE FOOD YOU BOUGHT JUST DIDN'T LAST AND YOU DIDN'T HAVE MONEY TO GET MORE.: NEVER TRUE

## 2024-11-19 SDOH — ECONOMIC STABILITY: FOOD INSECURITY: WITHIN THE PAST 12 MONTHS, YOU WORRIED THAT YOUR FOOD WOULD RUN OUT BEFORE YOU GOT THE MONEY TO BUY MORE.: NEVER TRUE

## 2024-11-19 ASSESSMENT — PAIN SCALES - GENERAL: PAINLEVEL_OUTOF10: 0-NO PAIN

## 2024-11-19 NOTE — PATIENT INSTRUCTIONS
Today you visited with your Oncologist.  Your recent labs were discussed and questions were answered.  Your current treatment regimen was discussed and the plan going forward was also discussed.  Scheduling orders were placed to reflect this conversation.  Please call our office for any questions that may arise after leaving today.   [phone]    Review your schedule upon leaving every infusion visit.  Remember you will no longer see lab visits noted separately on your schedule.    Your schedule has been adjusted to accommodate your vacations.

## 2024-11-19 NOTE — SIGNIFICANT EVENT

## 2024-11-19 NOTE — PROGRESS NOTES
Patient ID: Jason Burns is a 64 y.o. male.  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  #2) DM, HTN        History of Present Illness:   Completed SBRT to 50Gy in 5fx to the soft tissue recurrence (5.6cm in max diameter at that time), with COT on 05/19/2023. Patient presents for follow up and treatment. On monthly Opdivo, tolerating well.   Review of Systems   Constitutional: Negative.    HENT:  Negative.     Respiratory: Negative.     Cardiovascular: Negative.         Objective   BSA: 2.43 meters squared  /89 (BP Location: Right arm, Patient Position: Sitting, BP Cuff Size: Large adult)   Pulse 70   Temp 36.4 °C (97.5 °F) (Temporal)   Resp 17   Wt 120 kg (265 lb 6.9 oz)   SpO2 96%   BMI 38.65 kg/m²      has a past medical history of Campylobacter enteritis (11/02/2018), Personal history of other diseases of the digestive system (08/11/2016), Plantar fascial fibromatosis (08/19/2015), Renal cell cancer (Multi), and Unspecified sprain of unspecified great toe, initial encounter (08/19/2015).   has a past surgical history that includes Colonoscopy (10/21/2013); Umbilical hernia repair (04/25/2018); US guided needle liver biopsy (10/12/2020); CT angio abdomen pelvis w and or wo IV IV contrast (2/7/2021); and CT angio abdomen pelvis w and or wo IV IV contrast (2/10/2021).  Family History   Problem Relation Name Age of Onset    Pancreatic cancer Brother Jed      Oncology History   Renal cell carcinoma of right kidney (Multi)   10/22/2023 Initial Diagnosis    Renal cell carcinoma of right kidney (CMS/HCC)     10/24/2023 -  Chemotherapy    Nivolumab 480 mg, 28 Day Cycles         Jason Burns  reports that he has  never smoked. He has never used smokeless tobacco.  He  reports current alcohol use.  He  reports that he does not currently use drugs.    Physical Exam  Constitutional:       Appearance: Normal appearance.   HENT:      Head: Normocephalic and atraumatic.   Eyes:      Extraocular Movements: Extraocular movements intact.      Pupils: Pupils are equal, round, and reactive to light.   Neurological:      Mental Status: He is alert.         WBC   Date/Time Value Ref Range Status   10/22/2024 09:33 AM 5.6 4.4 - 11.3 x10*3/uL Final   09/24/2024 09:55 AM 5.1 4.4 - 11.3 x10*3/uL Final   08/27/2024 09:00 AM 5.2 4.4 - 11.3 x10*3/uL Final     nRBC   Date Value Ref Range Status   11/21/2023 0.0 0.0 - 0.0 /100 WBCs Final   08/29/2023 CANCELED 0.0 - 0.0 /100 WBC      Comment:     Result canceled by the ancillary.   08/23/2023 CANCELED       Comment:     Result canceled by the ancillary.   05/19/2023 CANCELED       Comment:     Result canceled by the ancillary.     RBC   Date Value Ref Range Status   10/22/2024 5.20 4.50 - 5.90 x10*6/uL Final   09/24/2024 5.09 4.50 - 5.90 x10*6/uL Final   08/27/2024 5.32 4.50 - 5.90 x10*6/uL Final     Hemoglobin   Date Value Ref Range Status   10/22/2024 15.4 13.5 - 17.5 g/dL Final   09/24/2024 15.1 13.5 - 17.5 g/dL Final   08/27/2024 15.9 13.5 - 17.5 g/dL Final     Hematocrit   Date Value Ref Range Status   10/22/2024 46.0 41.0 - 52.0 % Final   09/24/2024 44.7 41.0 - 52.0 % Final   08/27/2024 46.6 41.0 - 52.0 % Final     MCV   Date/Time Value Ref Range Status   10/22/2024 09:33 AM 89 80 - 100 fL Final   09/24/2024 09:55 AM 88 80 - 100 fL Final   08/27/2024 09:00 AM 88 80 - 100 fL Final     MCH   Date/Time Value Ref Range Status   10/22/2024 09:33 AM 29.6 26.0 - 34.0 pg Final   09/24/2024 09:55 AM 29.7 26.0 - 34.0 pg Final   08/27/2024 09:00 AM 29.9 26.0 - 34.0 pg Final     MCHC   Date/Time Value Ref Range Status   10/22/2024 09:33 AM 33.5 32.0 - 36.0 g/dL Final   09/24/2024 09:55 AM 33.8 32.0 -  36.0 g/dL Final   08/27/2024 09:00 AM 34.1 32.0 - 36.0 g/dL Final     RDW   Date/Time Value Ref Range Status   10/22/2024 09:33 AM 12.7 11.5 - 14.5 % Final   09/24/2024 09:55 AM 12.4 11.5 - 14.5 % Final   08/27/2024 09:00 AM 12.6 11.5 - 14.5 % Final     Platelets   Date/Time Value Ref Range Status   10/22/2024 09:33  150 - 450 x10*3/uL Final   09/24/2024 09:55  150 - 450 x10*3/uL Final   08/27/2024 09:00  150 - 450 x10*3/uL Final     MPV   Date/Time Value Ref Range Status   10/24/2023 02:34 PM 9.6 7.5 - 11.5 fL Final     Neutrophils %   Date/Time Value Ref Range Status   10/22/2024 09:33 AM 68.3 40.0 - 80.0 % Final   09/24/2024 09:55 AM 65.3 40.0 - 80.0 % Final   08/27/2024 09:00 AM 67.4 40.0 - 80.0 % Final     Immature Granulocytes %, Automated   Date/Time Value Ref Range Status   10/22/2024 09:33 AM 0.2 0.0 - 0.9 % Final     Comment:     Immature Granulocyte Count (IG) includes promyelocytes, myelocytes and metamyelocytes but does not include bands. Percent differential counts (%) should be interpreted in the context of the absolute cell counts (cells/UL).   09/24/2024 09:55 AM 0.2 0.0 - 0.9 % Final     Comment:     Immature Granulocyte Count (IG) includes promyelocytes, myelocytes and metamyelocytes but does not include bands. Percent differential counts (%) should be interpreted in the context of the absolute cell counts (cells/UL).   08/27/2024 09:00 AM 0.2 0.0 - 0.9 % Final     Comment:     Immature Granulocyte Count (IG) includes promyelocytes, myelocytes and metamyelocytes but does not include bands. Percent differential counts (%) should be interpreted in the context of the absolute cell counts (cells/UL).     Lymphocytes %   Date/Time Value Ref Range Status   10/22/2024 09:33 AM 19.9 13.0 - 44.0 % Final   09/24/2024 09:55 AM 20.4 13.0 - 44.0 % Final   08/27/2024 09:00 AM 20.7 13.0 - 44.0 % Final     Monocytes %   Date/Time Value Ref Range Status   10/22/2024 09:33 AM 7.3 2.0 - 10.0 %  Final   09/24/2024 09:55 AM 8.0 2.0 - 10.0 % Final   08/27/2024 09:00 AM 6.8 2.0 - 10.0 % Final     Eosinophils %   Date/Time Value Ref Range Status   10/22/2024 09:33 AM 3.6 0.0 - 6.0 % Final   09/24/2024 09:55 AM 4.9 0.0 - 6.0 % Final   08/27/2024 09:00 AM 4.1 0.0 - 6.0 % Final     Basophils %   Date/Time Value Ref Range Status   10/22/2024 09:33 AM 0.7 0.0 - 2.0 % Final   09/24/2024 09:55 AM 1.2 0.0 - 2.0 % Final   08/27/2024 09:00 AM 0.8 0.0 - 2.0 % Final     Neutrophils Absolute   Date/Time Value Ref Range Status   10/22/2024 09:33 AM 3.84 1.20 - 7.70 x10*3/uL Final     Comment:     Percent differential counts (%) should be interpreted in the context of the absolute cell counts (cells/uL).   09/24/2024 09:55 AM 3.34 1.20 - 7.70 x10*3/uL Final     Comment:     Percent differential counts (%) should be interpreted in the context of the absolute cell counts (cells/uL).   08/27/2024 09:00 AM 3.48 1.20 - 7.70 x10*3/uL Final     Comment:     Percent differential counts (%) should be interpreted in the context of the absolute cell counts (cells/uL).     Immature Granulocytes Absolute, Automated   Date/Time Value Ref Range Status   10/22/2024 09:33 AM 0.01 0.00 - 0.70 x10*3/uL Final   09/24/2024 09:55 AM 0.01 0.00 - 0.70 x10*3/uL Final   08/27/2024 09:00 AM 0.01 0.00 - 0.70 x10*3/uL Final     Lymphocytes Absolute   Date/Time Value Ref Range Status   10/22/2024 09:33 AM 1.12 (L) 1.20 - 4.80 x10*3/uL Final   09/24/2024 09:55 AM 1.04 (L) 1.20 - 4.80 x10*3/uL Final   08/27/2024 09:00 AM 1.07 (L) 1.20 - 4.80 x10*3/uL Final     Monocytes Absolute   Date/Time Value Ref Range Status   10/22/2024 09:33 AM 0.41 0.10 - 1.00 x10*3/uL Final   09/24/2024 09:55 AM 0.41 0.10 - 1.00 x10*3/uL Final   08/27/2024 09:00 AM 0.35 0.10 - 1.00 x10*3/uL Final     Eosinophils Absolute   Date/Time Value Ref Range Status   10/22/2024 09:33 AM 0.20 0.00 - 0.70 x10*3/uL Final   09/24/2024 09:55 AM 0.25 0.00 - 0.70 x10*3/uL Final   08/27/2024 09:00  "AM 0.21 0.00 - 0.70 x10*3/uL Final     Basophils Absolute   Date/Time Value Ref Range Status   10/22/2024 09:33 AM 0.04 0.00 - 0.10 x10*3/uL Final   09/24/2024 09:55 AM 0.06 0.00 - 0.10 x10*3/uL Final   08/27/2024 09:00 AM 0.04 0.00 - 0.10 x10*3/uL Final       No components found for: \"PT\"  aPTT   Date/Time Value Ref Range Status   01/03/2022 06:26 PM 30 26 - 39 sec Final     Comment:     Note new reference range as of 11/30/2021 at 10:00am.   12/29/2021 02:13 AM 26 26 - 39 sec Final     Comment:     Note new reference range as of 11/30/2021 at 10:00am.   12/16/2021 10:11 AM 32 26 - 39 sec Final     Comment:     Note new reference range as of 11/30/2021 at 10:00am.       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 Excellent response to 4 cycles of Ipi/Nivo. Course was complicated pneumonitis that has resolved. As well as occult GI bleed, unknown etiology, resolved.     Now on maintenance Opdivo doing well. CT in 10/2021 showed interval increase in size of a retroperitoneal soft tissue mass at the right nephrectomy bed currently measuring 24 x 29 mm. MRI abd 12/2021 showed enlarging R hepatic lobe and R pericolonic metastases.     S/P surgical resection of residual disease in liver and in nephrectomy bed in 12/2021. Path c/w residual disease retroperitoneal lymph nodes and liver.     Continue Opdivo Monthly.      3/2023:  CT scan noting oligometastatic disease in the right lower quadrant site of nephrectomy. Seen by rad onc and awaiting treatment.     5/9/2023:  Plan patient to continue Opdivo q. 28 days. Noted to have mild hypercalcemia. Plan for hydration and lab check next week. Will also obtain bone scan.     06/06/2023 patient showing oligometastatic disease.  Right lower quadrant site of nephrectomy.  Patient is status post radiation to the surgical bed with very good effect.  He is to continue Opdivo q. 28 days scheduled today.  Cleared for therapy.     09/26/2023 patient " presents today the results of his radiographic imaging indicating a response to treatment reduction in size no new lesions.  This has been explained to patient.  He will continue Opdivo q. 28 daily.  He has no complaints.     12/19/2023: Continue Opdivo every 4 weeks.    6/4/2024: CT CAP:IMPRESSION:  Sarcomatoid carcinoma of kidney restaging scan.  1. No evidence of metastatic disease identified in the chest. 2. Compared to prior CT dated 02/09/2024, there has been no significant interval change in the size of the soft tissue mesenteric implant in the right hemiabdomen with associated postradiation  changes.  3. Redemonstration of postsurgical changes of right posterior partial hepatectomy and right total nephrectomy, without interval increase in the size of small implants in the surgical bed.  4. No evidence of increased abdominopelvic lymphadenopathy.  5. Stable aneurysmal dilatation of the ascending aorta up to 4.6 cm.  6. Additional findings as above.       Signed by: Adrián Bowling 5/9/2024 8/27/2024:  CT CAP: IMPRESSION:   CHEST:   1. No intrathoracic metastatic disease.   2. Stable appearance of the aneurysmal dilatation of the ascending   thoracic aorta measuring 4.6 cm.   3. Stable appearance of the thyroid gland with multiple partially   calcific nodules.       ABDOMEN-PELVIS:   1. Status post radical right nephrectomy/adrenalectomy and partial   right posterior hepatectomy with a stable few subcentimeter tiny   nodules at the surgical bed posteriorly lateral to the right psoas   muscle measuring up to 0.7 cm.   2. Postradiation changes in the right hemiabdomen with a stable few   mesenteric nodules measuring up to 1.3 cm.   3. No new concerning findings in the abdomen or pelvis.        MACRO:   None      Signed by: Sai Paredes 8/13/2024   CT scan as stated above does not show any evidence of disease recurrence.  Will continue Opdivo patient is tolerating it well with no complications or adverse  effects.  Return to clinic in 4 months.    11/19/2024: CT scan 11/18/2024: as stated above does not show any evidence of disease recurrence.  Will continue Opdivo patient is tolerating it well with no complications or adverse effects.  Return to clinic in 4 months.     Diagnoses and all orders for this visit:  Renal cell carcinoma of right kidney (Multi)  -     Clinic Appointment Request  -     Infusion Appointment Request; Future  -     CBC and Auto Differential; Future  -     Comprehensive metabolic panel; Future  -     Acth; Future  -     Cortisol Am; Future  -     Tsh With Reflex To Free T4 If Abnormal; Future  -     Infusion Appointment Request; Future  -     CBC and Auto Differential; Future  -     Comprehensive metabolic panel; Future  -     Acth; Future  -     Cortisol Am; Future  -     Tsh With Reflex To Free T4 If Abnormal; Future  -     Infusion Appointment Request; Future  -     CBC and Auto Differential; Future  -     Comprehensive metabolic panel; Future  -     Acth; Future  -     Cortisol Am; Future  -     Tsh With Reflex To Free T4 If Abnormal; Future  -     Clinic Appointment Request; Future  -     Infusion Appointment Request; Future  -     CBC and Auto Differential; Future  -     Comprehensive metabolic panel; Future  -     Acth; Future  -     Cortisol Am; Future  -     Tsh With Reflex To Free T4 If Abnormal; Future  -     Clinic Appointment Request Chemo Follow Up; Future  Other orders  -     LORazepam (Ativan) tablet 0.5 mg  -     dexAMETHasone (Decadron) injection 8 mg  -     prochlorperazine (Compazine) tablet 10 mg  -     prochlorperazine (Compazine) injection 10 mg  -     nivolumab (Opdivo) 480 mg in sodium chloride 0.9% 148 mL IV  -     sodium chloride 0.9 % bolus 500 mL  -     dextrose 5 % in water (D5W) bolus 500 mL  -     diphenhydrAMINE (BENADryl) injection 50 mg  -     methylPREDNISolone sod succinate (SOLU-Medrol) 40 mg/mL injection 40 mg  -     famotidine PF (Pepcid) injection 20  mg  -     EPINEPHrine (Epipen) injection syringe 0.3 mg  -     albuterol 2.5 mg /3 mL (0.083 %) nebulizer solution 3 mL  -     LORazepam (Ativan) tablet 0.5 mg  -     dexAMETHasone (Decadron) injection 8 mg  -     prochlorperazine (Compazine) tablet 10 mg  -     prochlorperazine (Compazine) injection 10 mg  -     nivolumab (Opdivo) 480 mg in sodium chloride 0.9% 148 mL IV  -     sodium chloride 0.9 % bolus 500 mL  -     dextrose 5 % in water (D5W) bolus 500 mL  -     diphenhydrAMINE (BENADryl) injection 50 mg  -     methylPREDNISolone sod succinate (SOLU-Medrol) 40 mg/mL injection 40 mg  -     famotidine PF (Pepcid) injection 20 mg  -     EPINEPHrine (Epipen) injection syringe 0.3 mg  -     albuterol 2.5 mg /3 mL (0.083 %) nebulizer solution 3 mL  -     LORazepam (Ativan) tablet 0.5 mg  -     dexAMETHasone (Decadron) injection 8 mg  -     prochlorperazine (Compazine) tablet 10 mg  -     prochlorperazine (Compazine) injection 10 mg  -     nivolumab (Opdivo) 480 mg in sodium chloride 0.9% 148 mL IV  -     sodium chloride 0.9 % bolus 500 mL  -     dextrose 5 % in water (D5W) bolus 500 mL  -     diphenhydrAMINE (BENADryl) injection 50 mg  -     methylPREDNISolone sod succinate (SOLU-Medrol) 40 mg/mL injection 40 mg  -     famotidine PF (Pepcid) injection 20 mg  -     EPINEPHrine (Epipen) injection syringe 0.3 mg  -     albuterol 2.5 mg /3 mL (0.083 %) nebulizer solution 3 mL  -     LORazepam (Ativan) tablet 0.5 mg  -     dexAMETHasone (Decadron) injection 8 mg  -     prochlorperazine (Compazine) tablet 10 mg  -     prochlorperazine (Compazine) injection 10 mg  -     nivolumab (Opdivo) 480 mg in sodium chloride 0.9% 148 mL IV  -     sodium chloride 0.9 % bolus 500 mL  -     dextrose 5 % in water (D5W) bolus 500 mL  -     diphenhydrAMINE (BENADryl) injection 50 mg  -     methylPREDNISolone sod succinate (SOLU-Medrol) 40 mg/mL injection 40 mg  -     famotidine PF (Pepcid) injection 20 mg  -     EPINEPHrine (Epipen)  injection syringe 0.3 mg  -     albuterol 2.5 mg /3 mL (0.083 %) nebulizer solution 3 mL           Kelvin Retana MD

## 2024-11-20 ASSESSMENT — ENCOUNTER SYMPTOMS
RESPIRATORY NEGATIVE: 1
CONSTITUTIONAL NEGATIVE: 1
CARDIOVASCULAR NEGATIVE: 1

## 2024-11-21 LAB — ACTH PLAS-MCNC: 79.1 PG/ML (ref 7.2–63.3)

## 2024-11-25 ENCOUNTER — TELEPHONE (OUTPATIENT)
Dept: RADIATION ONCOLOGY | Facility: HOSPITAL | Age: 64
End: 2024-11-25
Payer: COMMERCIAL

## 2024-11-25 NOTE — TELEPHONE ENCOUNTER
11/26/2024 at 9:40 Pt CONFIRMED and will be present.    Pt is aware that the appointment is a phone/virtual visit, pt. understands that he/she doesn't have to show up in office.

## 2024-11-26 ENCOUNTER — HOSPITAL ENCOUNTER (OUTPATIENT)
Dept: RADIATION ONCOLOGY | Facility: HOSPITAL | Age: 64
Setting detail: RADIATION/ONCOLOGY SERIES
Discharge: HOME | End: 2024-11-26
Payer: COMMERCIAL

## 2024-11-26 DIAGNOSIS — C64.1 RENAL CELL CARCINOMA OF RIGHT KIDNEY (MULTI): ICD-10-CM

## 2024-11-26 PROCEDURE — 99213 OFFICE O/P EST LOW 20 MIN: CPT | Performed by: RADIOLOGY

## 2024-11-26 PROCEDURE — 99213 OFFICE O/P EST LOW 20 MIN: CPT | Mod: 95 | Performed by: RADIOLOGY

## 2024-11-26 ASSESSMENT — ENCOUNTER SYMPTOMS
CHILLS: 0
HEADACHES: 0
APPETITE CHANGE: 0
TROUBLE SWALLOWING: 0
RESPIRATORY NEGATIVE: 1
LOSS OF SENSATION IN FEET: 0
SPEECH DIFFICULTY: 0
CARDIOVASCULAR NEGATIVE: 1
SEIZURES: 0
BRUISES/BLEEDS EASILY: 0
SLEEP DISTURBANCE: 0
OCCASIONAL FEELINGS OF UNSTEADINESS: 0
SORE THROAT: 0
EYES NEGATIVE: 1
FEVER: 0
LIGHT-HEADEDNESS: 0
GASTROINTESTINAL NEGATIVE: 1
CONFUSION: 0
NERVOUS/ANXIOUS: 0
UNEXPECTED WEIGHT CHANGE: 0
DIAPHORESIS: 0
EXTREMITY WEAKNESS: 0
DEPRESSION: 0
DIZZINESS: 0
MUSCULOSKELETAL NEGATIVE: 1
DECREASED CONCENTRATION: 0
NUMBNESS: 0
ENDOCRINE NEGATIVE: 1
FATIGUE: 0

## 2024-11-26 NOTE — PROGRESS NOTES
Staff Physician: Breana Arora MD  Referring Physician: Breana Arora MD  Date of Service: 11/26/2024  RADIATION ONCOLOGY FOLLOW UP NOTE:  IDENTIFYING DATA:   Cancer Staging   No matching staging information was found for the patient.    Problem List Items Addressed This Visit       Renal cell carcinoma of right kidney (Multi)    Relevant Orders    Clinic Appointment Request Follow Up; BREANA ARORA; [facility] [address] [facility] (Completed)       Mr. Burns is a 64-year-old man with originally [id number] Stage III renal cell carcinoma, status post right nephrectomy, with subsequent recurrence at the resection bed with direct extension to the liver, status-post resection and partial hepatectomy,  then placed on immunotherapy, who developed a second recurrence in the soft tissue at the nephrectomy bed. He then completed SBRT to 50Gy in 5fx to the soft tissue recurrence (5.6cm in max diameter at that time), with COT on 05/19/2023.   He presents today for routine interval follow-up.    INTERVAL HISTORY:  Since we last saw Jason Burns in clinic 3mo ago, he has felt well.  His weight is stable from last visit and his appetite is unchanged. He denies any significant symptoms at this time. Specifically, he denies abdominal pain, pain after eating, nausea, vomiting, early satiety, tarry or sticky stool, or blood in stool. He has had no new medical problems or medications since our last visit. His last imaging, comprising CT-CAP on 11/18/24, demonstrates even further downsizing of his treated lesion with no evidence of any active disease. He is on opdivo Q4 weeks.     PAST MEDICAL, SURGICAL, FAMILY, AND SOCIAL HISTORY:  Past Medical History:   Diagnosis Date    Campylobacter enteritis 11/02/2018    Campylobacter gastroenteritis    Personal history of other diseases of the digestive system 08/11/2016    History of umbilical hernia    Plantar fascial fibromatosis 08/19/2015    Plantar fasciitis    Renal cell cancer  (Multi)     Unspecified sprain of unspecified great toe, initial encounter 2015    Sprain of great toe     Past Surgical History:   Procedure Laterality Date    COLONOSCOPY  10/21/2013    Complete Colonoscopy    CT ABDOMEN PELVIS ANGIOGRAM W AND/OR WO IV CONTRAST  2021    CT ABDOMEN PELVIS ANGIOGRAM W AND/OR WO IV CONTRAST 2021 [facility]    CT ABDOMEN PELVIS ANGIOGRAM W AND/OR WO IV CONTRAST  2/10/2021    CT ABDOMEN PELVIS ANGIOGRAM W AND/OR WO IV CONTRAST 2/10/2021 [facility]    UMBILICAL HERNIA REPAIR  2018    Umbilical Hernia Repair    US GUIDED NEEDLE LIVER BIOPSY  10/12/2020    US GUIDED NEEDLE LIVER BIOPSY 10/12/2020 [facility]     ALLERGIES:  No Known Allergies  MEDICATIONS:    Current Outpatient Medications:     cyanocobalamin (Vitamin B-12) 1,000 mcg tablet, Take 1 tablet (1,000 mcg) by mouth once daily., Disp: , Rfl:     escitalopram (Lexapro) 10 mg tablet, Take 1 tablet (10 mg) by mouth once daily., Disp: , Rfl:     losartan (Cozaar) 50 mg tablet, Take 1 tablet (50 mg) by mouth once daily., Disp: , Rfl:     multivitamin tablet, Take 1 tablet by mouth once daily., Disp: , Rfl:     empagliflozin (JARDIANCE ORAL), Inject 0.5 mg under the skin 1 (one) time per week. Started with injections, Disp: , Rfl:      REVIEW OF SYSTEMS:  Except for the symptoms described in the interval history, the review of systems is negative. Specifically, except as noted, when asked the patient expressed no complaints relative to constitutional (fever, weight loss), eyes, ears, nose, mouth, throat, neurologic, cardiovascular, pulmonary, breast, GI, , skin, musculoskeletal, endocrine, hematologic/lymphatic, or immunologic systems.    PERFORMANCE STATUS:  Karnofsky Performance Score/ECO, Fully active, able to carry on all pre-disease performed without restriction (ECOG equivalent 0)    PHYSICAL EXAMINATION:  There were no vitals taken for this visit.  Pain score: 0/10  Na  telemedicine    DIAGNOSTIC REPORTS REVIEWED:  Imaging: All imaging was personally reviewed and interpreted in clinic. Findings as per interval history and EMR.  Laboratory/Pathology:  All pertinent labs and pathology were personally reviewed and interpreted in clinic. Findings as per interval history and EMR.     IMPRESSION:  Mr. Burns has no evidence of active disease on scans at this time and no adverse effects of RT.    PLAN:  I have asked Jason Burns to please return to clinic in 6 month's time with a repeat CT-CAP, CBC, and CMP at that time. This will be coordinated with medical oncology. He knows to call with any questions or concerns in the interim.    PAIN PLAN: The patient reports their pain is well-controlled on their current regimen.  Their pain regimen is currently managed by Radiation Oncology.  No pain.    DISEASE STATUS: Controlled  NEW METACHRONOUS CANCER: No    Thank you for the opportunity to participate in the ongoing care of this pleasant man.    Breana Arora MD  11/26/2024  , Radiation Oncology

## 2024-11-26 NOTE — PROGRESS NOTES
Radiation Oncology Nursing Note    Pain: The patient's current pain level was assessed.  They report currently having a pain of 0 out of 10.  They feel their pain is under control without the use of pain medications.    Review of Systems:  Review of Systems   Constitutional:  Negative for appetite change, chills, diaphoresis, fatigue, fever and unexpected weight change.   HENT:   Negative for hearing loss, mouth sores, nosebleeds, sore throat, tinnitus and trouble swallowing.    Eyes: Negative.    Respiratory: Negative.     Cardiovascular: Negative.    Gastrointestinal: Negative.    Endocrine: Negative.    Genitourinary: Negative.     Musculoskeletal: Negative.  Negative for gait problem.   Skin: Negative.    Neurological:  Negative for dizziness, extremity weakness, gait problem, headaches, light-headedness, numbness, seizures and speech difficulty.   Hematological:  Does not bruise/bleed easily.   Psychiatric/Behavioral:  Negative for confusion, decreased concentration, depression, sleep disturbance and suicidal ideas. The patient is not nervous/anxious.

## 2024-11-27 ENCOUNTER — APPOINTMENT (OUTPATIENT)
Dept: RADIOLOGY | Facility: HOSPITAL | Age: 64
End: 2024-11-27
Payer: COMMERCIAL

## 2024-12-13 DIAGNOSIS — C64.1 RENAL CELL CARCINOMA OF RIGHT KIDNEY (MULTI): Primary | ICD-10-CM

## 2024-12-24 ENCOUNTER — INFUSION (OUTPATIENT)
Dept: HEMATOLOGY/ONCOLOGY | Facility: CLINIC | Age: 64
End: 2024-12-24
Payer: COMMERCIAL

## 2024-12-24 VITALS
SYSTOLIC BLOOD PRESSURE: 121 MMHG | BODY MASS INDEX: 38.62 KG/M2 | WEIGHT: 265.2 LBS | HEART RATE: 92 BPM | DIASTOLIC BLOOD PRESSURE: 82 MMHG | RESPIRATION RATE: 16 BRPM | OXYGEN SATURATION: 98 % | TEMPERATURE: 97.9 F

## 2024-12-24 DIAGNOSIS — C64.1 RENAL CELL CARCINOMA OF RIGHT KIDNEY (MULTI): ICD-10-CM

## 2024-12-24 LAB
ALBUMIN SERPL BCP-MCNC: 3.9 G/DL (ref 3.4–5)
ALP SERPL-CCNC: 59 U/L (ref 33–136)
ALT SERPL W P-5'-P-CCNC: 14 U/L (ref 10–52)
ANION GAP SERPL CALC-SCNC: 13 MMOL/L (ref 10–20)
AST SERPL W P-5'-P-CCNC: 14 U/L (ref 9–39)
BASOPHILS # BLD AUTO: 0.04 X10*3/UL (ref 0–0.1)
BASOPHILS NFR BLD AUTO: 0.5 %
BILIRUB SERPL-MCNC: 0.9 MG/DL (ref 0–1.2)
BUN SERPL-MCNC: 16 MG/DL (ref 6–23)
CALCIUM SERPL-MCNC: 8.8 MG/DL (ref 8.6–10.3)
CHLORIDE SERPL-SCNC: 103 MMOL/L (ref 98–107)
CO2 SERPL-SCNC: 26 MMOL/L (ref 21–32)
CORTIS AM PEAK SERPL-MSCNC: 13.4 UG/DL (ref 5–20)
CREAT SERPL-MCNC: 1.45 MG/DL (ref 0.5–1.3)
EGFRCR SERPLBLD CKD-EPI 2021: 54 ML/MIN/1.73M*2
EOSINOPHIL # BLD AUTO: 0.21 X10*3/UL (ref 0–0.7)
EOSINOPHIL NFR BLD AUTO: 2.5 %
ERYTHROCYTE [DISTWIDTH] IN BLOOD BY AUTOMATED COUNT: 12.2 % (ref 11.5–14.5)
GLUCOSE SERPL-MCNC: 191 MG/DL (ref 74–99)
HCT VFR BLD AUTO: 46.8 % (ref 41–52)
HGB BLD-MCNC: 15.9 G/DL (ref 13.5–17.5)
IMM GRANULOCYTES # BLD AUTO: 0.02 X10*3/UL (ref 0–0.7)
IMM GRANULOCYTES NFR BLD AUTO: 0.2 % (ref 0–0.9)
LYMPHOCYTES # BLD AUTO: 1.07 X10*3/UL (ref 1.2–4.8)
LYMPHOCYTES NFR BLD AUTO: 12.6 %
MCH RBC QN AUTO: 29.6 PG (ref 26–34)
MCHC RBC AUTO-ENTMCNC: 34 G/DL (ref 32–36)
MCV RBC AUTO: 87 FL (ref 80–100)
MONOCYTES # BLD AUTO: 0.66 X10*3/UL (ref 0.1–1)
MONOCYTES NFR BLD AUTO: 7.8 %
NEUTROPHILS # BLD AUTO: 6.5 X10*3/UL (ref 1.2–7.7)
NEUTROPHILS NFR BLD AUTO: 76.4 %
PLATELET # BLD AUTO: 182 X10*3/UL (ref 150–450)
POTASSIUM SERPL-SCNC: 3.9 MMOL/L (ref 3.5–5.3)
PROT SERPL-MCNC: 6.9 G/DL (ref 6.4–8.2)
RBC # BLD AUTO: 5.38 X10*6/UL (ref 4.5–5.9)
SODIUM SERPL-SCNC: 138 MMOL/L (ref 136–145)
T4 FREE SERPL-MCNC: 0.75 NG/DL (ref 0.61–1.12)
TSH SERPL-ACNC: 0.32 MIU/L (ref 0.44–3.98)
WBC # BLD AUTO: 8.5 X10*3/UL (ref 4.4–11.3)

## 2024-12-24 PROCEDURE — 2500000004 HC RX 250 GENERAL PHARMACY W/ HCPCS (ALT 636 FOR OP/ED): Performed by: INTERNAL MEDICINE

## 2024-12-24 PROCEDURE — 85025 COMPLETE CBC W/AUTO DIFF WBC: CPT

## 2024-12-24 PROCEDURE — 82533 TOTAL CORTISOL: CPT | Mod: GEALAB

## 2024-12-24 PROCEDURE — 96375 TX/PRO/DX INJ NEW DRUG ADDON: CPT | Mod: INF

## 2024-12-24 PROCEDURE — 82024 ASSAY OF ACTH: CPT

## 2024-12-24 PROCEDURE — 84443 ASSAY THYROID STIM HORMONE: CPT

## 2024-12-24 PROCEDURE — 84075 ASSAY ALKALINE PHOSPHATASE: CPT

## 2024-12-24 PROCEDURE — 84439 ASSAY OF FREE THYROXINE: CPT

## 2024-12-24 PROCEDURE — 2500000001 HC RX 250 WO HCPCS SELF ADMINISTERED DRUGS (ALT 637 FOR MEDICARE OP): Performed by: INTERNAL MEDICINE

## 2024-12-24 PROCEDURE — 96413 CHEMO IV INFUSION 1 HR: CPT

## 2024-12-24 RX ORDER — DIPHENHYDRAMINE HYDROCHLORIDE 50 MG/ML
50 INJECTION INTRAMUSCULAR; INTRAVENOUS AS NEEDED
Status: DISCONTINUED | OUTPATIENT
Start: 2024-12-24 | End: 2024-12-24 | Stop reason: HOSPADM

## 2024-12-24 RX ORDER — FAMOTIDINE 10 MG/ML
20 INJECTION INTRAVENOUS ONCE AS NEEDED
Status: DISCONTINUED | OUTPATIENT
Start: 2024-12-24 | End: 2024-12-24 | Stop reason: HOSPADM

## 2024-12-24 RX ORDER — ALBUTEROL SULFATE 0.83 MG/ML
3 SOLUTION RESPIRATORY (INHALATION) AS NEEDED
Status: DISCONTINUED | OUTPATIENT
Start: 2024-12-24 | End: 2024-12-24 | Stop reason: HOSPADM

## 2024-12-24 RX ORDER — PROCHLORPERAZINE MALEATE 10 MG
10 TABLET ORAL EVERY 6 HOURS PRN
Status: DISCONTINUED | OUTPATIENT
Start: 2024-12-24 | End: 2024-12-24 | Stop reason: HOSPADM

## 2024-12-24 RX ORDER — EPINEPHRINE 0.3 MG/.3ML
0.3 INJECTION SUBCUTANEOUS EVERY 5 MIN PRN
Status: DISCONTINUED | OUTPATIENT
Start: 2024-12-24 | End: 2024-12-24 | Stop reason: HOSPADM

## 2024-12-24 RX ORDER — PROCHLORPERAZINE EDISYLATE 5 MG/ML
10 INJECTION INTRAMUSCULAR; INTRAVENOUS EVERY 6 HOURS PRN
Status: DISCONTINUED | OUTPATIENT
Start: 2024-12-24 | End: 2024-12-24 | Stop reason: HOSPADM

## 2024-12-24 RX ORDER — LORAZEPAM 0.5 MG/1
0.5 TABLET ORAL ONCE
Status: COMPLETED | OUTPATIENT
Start: 2024-12-24 | End: 2024-12-24

## 2024-12-24 ASSESSMENT — PAIN SCALES - GENERAL: PAINLEVEL_OUTOF10: 0-NO PAIN

## 2024-12-27 LAB — ACTH PLAS-MCNC: 107 PG/ML (ref 7.2–63.3)

## 2025-01-21 ENCOUNTER — INFUSION (OUTPATIENT)
Dept: HEMATOLOGY/ONCOLOGY | Facility: CLINIC | Age: 65
End: 2025-01-21
Payer: COMMERCIAL

## 2025-01-21 VITALS
DIASTOLIC BLOOD PRESSURE: 88 MMHG | RESPIRATION RATE: 17 BRPM | BODY MASS INDEX: 39.55 KG/M2 | SYSTOLIC BLOOD PRESSURE: 143 MMHG | OXYGEN SATURATION: 97 % | WEIGHT: 271.61 LBS | TEMPERATURE: 97.9 F | HEART RATE: 85 BPM

## 2025-01-21 DIAGNOSIS — C64.1 RENAL CELL CARCINOMA OF RIGHT KIDNEY (MULTI): ICD-10-CM

## 2025-01-21 LAB
ALBUMIN SERPL BCP-MCNC: 3.9 G/DL (ref 3.4–5)
ALP SERPL-CCNC: 63 U/L (ref 33–136)
ALT SERPL W P-5'-P-CCNC: 16 U/L (ref 10–52)
ANION GAP SERPL CALC-SCNC: 12 MMOL/L (ref 10–20)
AST SERPL W P-5'-P-CCNC: 14 U/L (ref 9–39)
BASOPHILS # BLD AUTO: 0.05 X10*3/UL (ref 0–0.1)
BASOPHILS NFR BLD AUTO: 1.1 %
BILIRUB SERPL-MCNC: 0.5 MG/DL (ref 0–1.2)
BUN SERPL-MCNC: 21 MG/DL (ref 6–23)
CALCIUM SERPL-MCNC: 9.1 MG/DL (ref 8.6–10.3)
CHLORIDE SERPL-SCNC: 100 MMOL/L (ref 98–107)
CO2 SERPL-SCNC: 28 MMOL/L (ref 21–32)
CORTIS AM PEAK SERPL-MSCNC: 9.4 UG/DL (ref 5–20)
CREAT SERPL-MCNC: 1.45 MG/DL (ref 0.5–1.3)
EGFRCR SERPLBLD CKD-EPI 2021: 54 ML/MIN/1.73M*2
EOSINOPHIL # BLD AUTO: 0.17 X10*3/UL (ref 0–0.7)
EOSINOPHIL NFR BLD AUTO: 3.7 %
ERYTHROCYTE [DISTWIDTH] IN BLOOD BY AUTOMATED COUNT: 12.5 % (ref 11.5–14.5)
GLUCOSE SERPL-MCNC: 314 MG/DL (ref 74–99)
HCT VFR BLD AUTO: 44.4 % (ref 41–52)
HGB BLD-MCNC: 15 G/DL (ref 13.5–17.5)
IMM GRANULOCYTES # BLD AUTO: 0.02 X10*3/UL (ref 0–0.7)
IMM GRANULOCYTES NFR BLD AUTO: 0.4 % (ref 0–0.9)
LYMPHOCYTES # BLD AUTO: 0.94 X10*3/UL (ref 1.2–4.8)
LYMPHOCYTES NFR BLD AUTO: 20.5 %
MCH RBC QN AUTO: 30 PG (ref 26–34)
MCHC RBC AUTO-ENTMCNC: 33.8 G/DL (ref 32–36)
MCV RBC AUTO: 89 FL (ref 80–100)
MONOCYTES # BLD AUTO: 0.31 X10*3/UL (ref 0.1–1)
MONOCYTES NFR BLD AUTO: 6.8 %
NEUTROPHILS # BLD AUTO: 3.1 X10*3/UL (ref 1.2–7.7)
NEUTROPHILS NFR BLD AUTO: 67.5 %
PLATELET # BLD AUTO: 195 X10*3/UL (ref 150–450)
POTASSIUM SERPL-SCNC: 3.9 MMOL/L (ref 3.5–5.3)
PROT SERPL-MCNC: 7.2 G/DL (ref 6.4–8.2)
RBC # BLD AUTO: 5 X10*6/UL (ref 4.5–5.9)
SODIUM SERPL-SCNC: 136 MMOL/L (ref 136–145)
TSH SERPL-ACNC: 0.55 MIU/L (ref 0.44–3.98)
WBC # BLD AUTO: 4.6 X10*3/UL (ref 4.4–11.3)

## 2025-01-21 PROCEDURE — 82024 ASSAY OF ACTH: CPT

## 2025-01-21 PROCEDURE — 96413 CHEMO IV INFUSION 1 HR: CPT

## 2025-01-21 PROCEDURE — 80053 COMPREHEN METABOLIC PANEL: CPT

## 2025-01-21 PROCEDURE — 2500000001 HC RX 250 WO HCPCS SELF ADMINISTERED DRUGS (ALT 637 FOR MEDICARE OP): Performed by: INTERNAL MEDICINE

## 2025-01-21 PROCEDURE — 2500000004 HC RX 250 GENERAL PHARMACY W/ HCPCS (ALT 636 FOR OP/ED): Performed by: INTERNAL MEDICINE

## 2025-01-21 PROCEDURE — 36415 COLL VENOUS BLD VENIPUNCTURE: CPT

## 2025-01-21 PROCEDURE — 82533 TOTAL CORTISOL: CPT | Mod: GEALAB

## 2025-01-21 PROCEDURE — 85025 COMPLETE CBC W/AUTO DIFF WBC: CPT

## 2025-01-21 PROCEDURE — 84443 ASSAY THYROID STIM HORMONE: CPT

## 2025-01-21 PROCEDURE — 96375 TX/PRO/DX INJ NEW DRUG ADDON: CPT | Mod: INF

## 2025-01-21 RX ORDER — PROCHLORPERAZINE MALEATE 10 MG
10 TABLET ORAL EVERY 6 HOURS PRN
Status: DISCONTINUED | OUTPATIENT
Start: 2025-01-21 | End: 2025-01-21 | Stop reason: HOSPADM

## 2025-01-21 RX ORDER — HEPARIN 100 UNIT/ML
500 SYRINGE INTRAVENOUS AS NEEDED
OUTPATIENT
Start: 2025-01-21

## 2025-01-21 RX ORDER — FAMOTIDINE 10 MG/ML
20 INJECTION INTRAVENOUS ONCE AS NEEDED
Status: DISCONTINUED | OUTPATIENT
Start: 2025-01-21 | End: 2025-01-21 | Stop reason: HOSPADM

## 2025-01-21 RX ORDER — LORAZEPAM 0.5 MG/1
0.5 TABLET ORAL ONCE
Status: COMPLETED | OUTPATIENT
Start: 2025-01-21 | End: 2025-01-21

## 2025-01-21 RX ORDER — HEPARIN SODIUM,PORCINE/PF 10 UNIT/ML
50 SYRINGE (ML) INTRAVENOUS AS NEEDED
Status: CANCELLED | OUTPATIENT
Start: 2025-01-21

## 2025-01-21 RX ORDER — HEPARIN SODIUM,PORCINE/PF 10 UNIT/ML
50 SYRINGE (ML) INTRAVENOUS AS NEEDED
OUTPATIENT
Start: 2025-01-21

## 2025-01-21 RX ORDER — DIPHENHYDRAMINE HYDROCHLORIDE 50 MG/ML
50 INJECTION INTRAMUSCULAR; INTRAVENOUS AS NEEDED
Status: DISCONTINUED | OUTPATIENT
Start: 2025-01-21 | End: 2025-01-21 | Stop reason: HOSPADM

## 2025-01-21 RX ORDER — EPINEPHRINE 0.3 MG/.3ML
0.3 INJECTION SUBCUTANEOUS EVERY 5 MIN PRN
Status: DISCONTINUED | OUTPATIENT
Start: 2025-01-21 | End: 2025-01-21 | Stop reason: HOSPADM

## 2025-01-21 RX ORDER — PROCHLORPERAZINE EDISYLATE 5 MG/ML
10 INJECTION INTRAMUSCULAR; INTRAVENOUS EVERY 6 HOURS PRN
Status: DISCONTINUED | OUTPATIENT
Start: 2025-01-21 | End: 2025-01-21 | Stop reason: HOSPADM

## 2025-01-21 RX ORDER — HEPARIN 100 UNIT/ML
500 SYRINGE INTRAVENOUS AS NEEDED
Status: CANCELLED | OUTPATIENT
Start: 2025-01-21

## 2025-01-21 RX ORDER — ALBUTEROL SULFATE 0.83 MG/ML
3 SOLUTION RESPIRATORY (INHALATION) AS NEEDED
Status: DISCONTINUED | OUTPATIENT
Start: 2025-01-21 | End: 2025-01-21 | Stop reason: HOSPADM

## 2025-01-21 RX ADMIN — SODIUM CHLORIDE 480 MG: 9 INJECTION, SOLUTION INTRAVENOUS at 11:47

## 2025-01-21 RX ADMIN — LORAZEPAM 0.5 MG: 0.5 TABLET ORAL at 11:04

## 2025-01-21 RX ADMIN — DEXAMETHASONE SODIUM PHOSPHATE 8 MG: 4 INJECTION INTRA-ARTICULAR; INTRALESIONAL; INTRAMUSCULAR; INTRAVENOUS; SOFT TISSUE at 11:04

## 2025-01-21 ASSESSMENT — PAIN SCALES - GENERAL: PAINLEVEL_OUTOF10: 0-NO PAIN

## 2025-01-21 NOTE — SIGNIFICANT EVENT

## 2025-01-21 NOTE — PROGRESS NOTES
Music Therapy Note    Jason Burns was referred by Breana Youssef, BRYS[place], MT-BC.    Therapy Session  Referral Type: New referral this admission  Visit Type: New visit  Session Start Time: 0956  Session End Time: 1005  Intervention Delivery: In-person  Conflict of Service: None  Family Present for Session: None     Pre-assessment  Mood/Affect: Appropriate, Calm         Treatment/Interventions  Music Therapy Interventions: Assessment  Interruption: No    Post-assessment  Unable to Assess Reason: Did not provide expressive therapy intervention  Mood/Affect: Appropriate  Verbalized Emotional State: Gratitude  Continue Visiting: Yes  Total Session Time (min): 9 minutes    Narrative  Assessment Detail: Pt was waiting for treatment to begin when approached by MT. Pt was pleasant and easily engaged. MT provided education on services, specifically introducing herself and services as a new resource. Pt was open to education, but ultimately declined on this occasion. He stated that he would enjoy music during a later visit. 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 Pt stated that he enjoys most music, but specifically named rock and roll. When asked what his favorite concert was, pt named a local artist, Mango Granado. He shared that he had always wanted to learn guitar but never had. Pt expressed gratitude for MT stopping by and denied any other needs at this time.  Follow-up: MT will follow up as able and appropriate.    Education Documentation  Resources, taught by Breana Youssef at 1/21/2025 10:26 AM.  Learner: Patient  Readiness: Acceptance  Method: Explanation  Response: Verbalizes Understanding

## 2025-01-22 LAB — ACTH PLAS-MCNC: 69.3 PG/ML (ref 7.2–63.3)

## 2025-02-25 ENCOUNTER — OFFICE VISIT (OUTPATIENT)
Dept: HEMATOLOGY/ONCOLOGY | Facility: CLINIC | Age: 65
End: 2025-02-25
Payer: COMMERCIAL

## 2025-02-25 ENCOUNTER — INFUSION (OUTPATIENT)
Dept: HEMATOLOGY/ONCOLOGY | Facility: CLINIC | Age: 65
End: 2025-02-25
Payer: COMMERCIAL

## 2025-02-25 VITALS
HEART RATE: 70 BPM | DIASTOLIC BLOOD PRESSURE: 92 MMHG | TEMPERATURE: 97.5 F | RESPIRATION RATE: 17 BRPM | WEIGHT: 269.62 LBS | BODY MASS INDEX: 39.26 KG/M2 | OXYGEN SATURATION: 94 % | SYSTOLIC BLOOD PRESSURE: 153 MMHG

## 2025-02-25 DIAGNOSIS — C64.1 RENAL CELL CARCINOMA OF RIGHT KIDNEY (MULTI): Primary | ICD-10-CM

## 2025-02-25 DIAGNOSIS — C64.1 RENAL CELL CARCINOMA OF RIGHT KIDNEY (MULTI): ICD-10-CM

## 2025-02-25 LAB
ALBUMIN SERPL BCP-MCNC: 4 G/DL (ref 3.4–5)
ALP SERPL-CCNC: 55 U/L (ref 33–136)
ALT SERPL W P-5'-P-CCNC: 22 U/L (ref 10–52)
ANION GAP SERPL CALC-SCNC: 13 MMOL/L (ref 10–20)
AST SERPL W P-5'-P-CCNC: 24 U/L (ref 9–39)
BASOPHILS # BLD AUTO: 0.04 X10*3/UL (ref 0–0.1)
BASOPHILS NFR BLD AUTO: 1 %
BILIRUB SERPL-MCNC: 0.5 MG/DL (ref 0–1.2)
BUN SERPL-MCNC: 15 MG/DL (ref 6–23)
CALCIUM SERPL-MCNC: 9.3 MG/DL (ref 8.6–10.3)
CHLORIDE SERPL-SCNC: 102 MMOL/L (ref 98–107)
CO2 SERPL-SCNC: 28 MMOL/L (ref 21–32)
CORTIS AM PEAK SERPL-MSCNC: 14.4 UG/DL (ref 5–20)
CREAT SERPL-MCNC: 1.61 MG/DL (ref 0.5–1.3)
EGFRCR SERPLBLD CKD-EPI 2021: 48 ML/MIN/1.73M*2
EOSINOPHIL # BLD AUTO: 0.14 X10*3/UL (ref 0–0.7)
EOSINOPHIL NFR BLD AUTO: 3.4 %
ERYTHROCYTE [DISTWIDTH] IN BLOOD BY AUTOMATED COUNT: 12.7 % (ref 11.5–14.5)
GLUCOSE SERPL-MCNC: 191 MG/DL (ref 74–99)
HCT VFR BLD AUTO: 42.6 % (ref 41–52)
HGB BLD-MCNC: 14.4 G/DL (ref 13.5–17.5)
IMM GRANULOCYTES # BLD AUTO: 0 X10*3/UL (ref 0–0.7)
IMM GRANULOCYTES NFR BLD AUTO: 0 % (ref 0–0.9)
LYMPHOCYTES # BLD AUTO: 1.32 X10*3/UL (ref 1.2–4.8)
LYMPHOCYTES NFR BLD AUTO: 32.4 %
MCH RBC QN AUTO: 29.6 PG (ref 26–34)
MCHC RBC AUTO-ENTMCNC: 33.8 G/DL (ref 32–36)
MCV RBC AUTO: 88 FL (ref 80–100)
MONOCYTES # BLD AUTO: 0.3 X10*3/UL (ref 0.1–1)
MONOCYTES NFR BLD AUTO: 7.4 %
NEUTROPHILS # BLD AUTO: 2.27 X10*3/UL (ref 1.2–7.7)
NEUTROPHILS NFR BLD AUTO: 55.8 %
PLATELET # BLD AUTO: 161 X10*3/UL (ref 150–450)
POTASSIUM SERPL-SCNC: 4.4 MMOL/L (ref 3.5–5.3)
PROT SERPL-MCNC: 7.4 G/DL (ref 6.4–8.2)
RBC # BLD AUTO: 4.87 X10*6/UL (ref 4.5–5.9)
SODIUM SERPL-SCNC: 139 MMOL/L (ref 136–145)
TSH SERPL-ACNC: 0.74 MIU/L (ref 0.44–3.98)
WBC # BLD AUTO: 4.1 X10*3/UL (ref 4.4–11.3)

## 2025-02-25 PROCEDURE — 84443 ASSAY THYROID STIM HORMONE: CPT | Performed by: INTERNAL MEDICINE

## 2025-02-25 PROCEDURE — 4010F ACE/ARB THERAPY RXD/TAKEN: CPT | Performed by: INTERNAL MEDICINE

## 2025-02-25 PROCEDURE — 2500000004 HC RX 250 GENERAL PHARMACY W/ HCPCS (ALT 636 FOR OP/ED): Mod: JZ | Performed by: INTERNAL MEDICINE

## 2025-02-25 PROCEDURE — 96375 TX/PRO/DX INJ NEW DRUG ADDON: CPT | Mod: INF

## 2025-02-25 PROCEDURE — 96413 CHEMO IV INFUSION 1 HR: CPT

## 2025-02-25 PROCEDURE — 2500000001 HC RX 250 WO HCPCS SELF ADMINISTERED DRUGS (ALT 637 FOR MEDICARE OP): Performed by: INTERNAL MEDICINE

## 2025-02-25 PROCEDURE — 3077F SYST BP >= 140 MM HG: CPT | Performed by: INTERNAL MEDICINE

## 2025-02-25 PROCEDURE — 80053 COMPREHEN METABOLIC PANEL: CPT | Performed by: INTERNAL MEDICINE

## 2025-02-25 PROCEDURE — 82533 TOTAL CORTISOL: CPT | Mod: GEALAB | Performed by: INTERNAL MEDICINE

## 2025-02-25 PROCEDURE — 85025 COMPLETE CBC W/AUTO DIFF WBC: CPT | Performed by: INTERNAL MEDICINE

## 2025-02-25 PROCEDURE — 82024 ASSAY OF ACTH: CPT | Performed by: INTERNAL MEDICINE

## 2025-02-25 PROCEDURE — 2500000004 HC RX 250 GENERAL PHARMACY W/ HCPCS (ALT 636 FOR OP/ED): Performed by: INTERNAL MEDICINE

## 2025-02-25 PROCEDURE — 3080F DIAST BP >= 90 MM HG: CPT | Performed by: INTERNAL MEDICINE

## 2025-02-25 PROCEDURE — 99214 OFFICE O/P EST MOD 30 MIN: CPT | Performed by: INTERNAL MEDICINE

## 2025-02-25 PROCEDURE — 36415 COLL VENOUS BLD VENIPUNCTURE: CPT | Performed by: INTERNAL MEDICINE

## 2025-02-25 PROCEDURE — 99214 OFFICE O/P EST MOD 30 MIN: CPT | Mod: 25 | Performed by: INTERNAL MEDICINE

## 2025-02-25 RX ORDER — EPINEPHRINE 0.3 MG/.3ML
0.3 INJECTION SUBCUTANEOUS EVERY 5 MIN PRN
OUTPATIENT
Start: 2025-04-22

## 2025-02-25 RX ORDER — FAMOTIDINE 10 MG/ML
20 INJECTION, SOLUTION INTRAVENOUS ONCE AS NEEDED
OUTPATIENT
Start: 2025-04-22

## 2025-02-25 RX ORDER — PROCHLORPERAZINE EDISYLATE 5 MG/ML
10 INJECTION INTRAMUSCULAR; INTRAVENOUS EVERY 6 HOURS PRN
OUTPATIENT
Start: 2025-04-22

## 2025-02-25 RX ORDER — LORAZEPAM 0.5 MG/1
0.5 TABLET ORAL ONCE
Status: COMPLETED | OUTPATIENT
Start: 2025-02-25 | End: 2025-02-25

## 2025-02-25 RX ORDER — DIPHENHYDRAMINE HYDROCHLORIDE 50 MG/ML
50 INJECTION INTRAMUSCULAR; INTRAVENOUS AS NEEDED
OUTPATIENT
Start: 2025-07-15

## 2025-02-25 RX ORDER — EPINEPHRINE 0.3 MG/.3ML
0.3 INJECTION SUBCUTANEOUS EVERY 5 MIN PRN
Status: DISCONTINUED | OUTPATIENT
Start: 2025-02-25 | End: 2025-02-25 | Stop reason: HOSPADM

## 2025-02-25 RX ORDER — PROCHLORPERAZINE MALEATE 5 MG
10 TABLET ORAL EVERY 6 HOURS PRN
OUTPATIENT
Start: 2025-06-17

## 2025-02-25 RX ORDER — FAMOTIDINE 10 MG/ML
20 INJECTION, SOLUTION INTRAVENOUS ONCE AS NEEDED
Status: DISCONTINUED | OUTPATIENT
Start: 2025-02-25 | End: 2025-02-25 | Stop reason: HOSPADM

## 2025-02-25 RX ORDER — PROCHLORPERAZINE EDISYLATE 5 MG/ML
10 INJECTION INTRAMUSCULAR; INTRAVENOUS EVERY 6 HOURS PRN
OUTPATIENT
Start: 2025-05-20

## 2025-02-25 RX ORDER — ALBUTEROL SULFATE 0.83 MG/ML
3 SOLUTION RESPIRATORY (INHALATION) AS NEEDED
OUTPATIENT
Start: 2025-07-15

## 2025-02-25 RX ORDER — DIPHENHYDRAMINE HYDROCHLORIDE 50 MG/ML
50 INJECTION INTRAMUSCULAR; INTRAVENOUS AS NEEDED
Status: DISCONTINUED | OUTPATIENT
Start: 2025-02-25 | End: 2025-02-25 | Stop reason: HOSPADM

## 2025-02-25 RX ORDER — LORAZEPAM 0.5 MG/1
0.5 TABLET ORAL ONCE
Start: 2025-06-17 | End: 2025-06-17

## 2025-02-25 RX ORDER — FAMOTIDINE 10 MG/ML
20 INJECTION, SOLUTION INTRAVENOUS ONCE AS NEEDED
OUTPATIENT
Start: 2025-05-20

## 2025-02-25 RX ORDER — PROCHLORPERAZINE EDISYLATE 5 MG/ML
10 INJECTION INTRAMUSCULAR; INTRAVENOUS EVERY 6 HOURS PRN
OUTPATIENT
Start: 2025-07-15

## 2025-02-25 RX ORDER — EPINEPHRINE 0.3 MG/.3ML
0.3 INJECTION SUBCUTANEOUS EVERY 5 MIN PRN
OUTPATIENT
Start: 2025-08-12

## 2025-02-25 RX ORDER — FAMOTIDINE 10 MG/ML
20 INJECTION, SOLUTION INTRAVENOUS ONCE AS NEEDED
OUTPATIENT
Start: 2025-08-12

## 2025-02-25 RX ORDER — LORAZEPAM 0.5 MG/1
0.5 TABLET ORAL ONCE
Start: 2025-08-12 | End: 2025-08-12

## 2025-02-25 RX ORDER — EPINEPHRINE 0.3 MG/.3ML
0.3 INJECTION SUBCUTANEOUS EVERY 5 MIN PRN
OUTPATIENT
Start: 2025-06-17

## 2025-02-25 RX ORDER — DIPHENHYDRAMINE HYDROCHLORIDE 50 MG/ML
50 INJECTION INTRAMUSCULAR; INTRAVENOUS AS NEEDED
OUTPATIENT
Start: 2025-06-17

## 2025-02-25 RX ORDER — EPINEPHRINE 0.3 MG/.3ML
0.3 INJECTION SUBCUTANEOUS EVERY 5 MIN PRN
OUTPATIENT
Start: 2025-05-20

## 2025-02-25 RX ORDER — PROCHLORPERAZINE EDISYLATE 5 MG/ML
10 INJECTION INTRAMUSCULAR; INTRAVENOUS EVERY 6 HOURS PRN
OUTPATIENT
Start: 2025-08-12

## 2025-02-25 RX ORDER — FAMOTIDINE 10 MG/ML
20 INJECTION, SOLUTION INTRAVENOUS ONCE AS NEEDED
OUTPATIENT
Start: 2025-07-15

## 2025-02-25 RX ORDER — LORAZEPAM 0.5 MG/1
0.5 TABLET ORAL ONCE
Start: 2025-07-15 | End: 2025-07-15

## 2025-02-25 RX ORDER — PROCHLORPERAZINE EDISYLATE 5 MG/ML
10 INJECTION INTRAMUSCULAR; INTRAVENOUS EVERY 6 HOURS PRN
Status: DISCONTINUED | OUTPATIENT
Start: 2025-02-25 | End: 2025-02-25 | Stop reason: HOSPADM

## 2025-02-25 RX ORDER — ALBUTEROL SULFATE 0.83 MG/ML
3 SOLUTION RESPIRATORY (INHALATION) AS NEEDED
Status: DISCONTINUED | OUTPATIENT
Start: 2025-02-25 | End: 2025-02-25 | Stop reason: HOSPADM

## 2025-02-25 RX ORDER — ALBUTEROL SULFATE 0.83 MG/ML
3 SOLUTION RESPIRATORY (INHALATION) AS NEEDED
OUTPATIENT
Start: 2025-05-20

## 2025-02-25 RX ORDER — PROCHLORPERAZINE EDISYLATE 5 MG/ML
10 INJECTION INTRAMUSCULAR; INTRAVENOUS EVERY 6 HOURS PRN
OUTPATIENT
Start: 2025-06-17

## 2025-02-25 RX ORDER — PROCHLORPERAZINE MALEATE 5 MG
10 TABLET ORAL EVERY 6 HOURS PRN
OUTPATIENT
Start: 2025-04-22

## 2025-02-25 RX ORDER — LORAZEPAM 0.5 MG/1
0.5 TABLET ORAL ONCE
Start: 2025-05-20 | End: 2025-05-20

## 2025-02-25 RX ORDER — FAMOTIDINE 10 MG/ML
20 INJECTION, SOLUTION INTRAVENOUS ONCE AS NEEDED
OUTPATIENT
Start: 2025-06-17

## 2025-02-25 RX ORDER — ALBUTEROL SULFATE 0.83 MG/ML
3 SOLUTION RESPIRATORY (INHALATION) AS NEEDED
OUTPATIENT
Start: 2025-06-17

## 2025-02-25 RX ORDER — ALBUTEROL SULFATE 0.83 MG/ML
3 SOLUTION RESPIRATORY (INHALATION) AS NEEDED
OUTPATIENT
Start: 2025-04-22

## 2025-02-25 RX ORDER — PROCHLORPERAZINE MALEATE 10 MG
10 TABLET ORAL EVERY 6 HOURS PRN
Status: DISCONTINUED | OUTPATIENT
Start: 2025-02-25 | End: 2025-02-25 | Stop reason: HOSPADM

## 2025-02-25 RX ORDER — DIPHENHYDRAMINE HYDROCHLORIDE 50 MG/ML
50 INJECTION INTRAMUSCULAR; INTRAVENOUS AS NEEDED
OUTPATIENT
Start: 2025-04-22

## 2025-02-25 RX ORDER — EPINEPHRINE 0.3 MG/.3ML
0.3 INJECTION SUBCUTANEOUS EVERY 5 MIN PRN
OUTPATIENT
Start: 2025-07-15

## 2025-02-25 RX ORDER — PROCHLORPERAZINE MALEATE 5 MG
10 TABLET ORAL EVERY 6 HOURS PRN
OUTPATIENT
Start: 2025-07-15

## 2025-02-25 RX ORDER — LORAZEPAM 0.5 MG/1
0.5 TABLET ORAL ONCE
Start: 2025-04-22 | End: 2025-04-22

## 2025-02-25 RX ORDER — ALBUTEROL SULFATE 0.83 MG/ML
3 SOLUTION RESPIRATORY (INHALATION) AS NEEDED
OUTPATIENT
Start: 2025-08-12

## 2025-02-25 RX ORDER — DIPHENHYDRAMINE HYDROCHLORIDE 50 MG/ML
50 INJECTION INTRAMUSCULAR; INTRAVENOUS AS NEEDED
OUTPATIENT
Start: 2025-05-20

## 2025-02-25 RX ORDER — PROCHLORPERAZINE MALEATE 5 MG
10 TABLET ORAL EVERY 6 HOURS PRN
OUTPATIENT
Start: 2025-05-20

## 2025-02-25 RX ORDER — PROCHLORPERAZINE MALEATE 5 MG
10 TABLET ORAL EVERY 6 HOURS PRN
OUTPATIENT
Start: 2025-08-12

## 2025-02-25 RX ORDER — DIPHENHYDRAMINE HYDROCHLORIDE 50 MG/ML
50 INJECTION INTRAMUSCULAR; INTRAVENOUS AS NEEDED
OUTPATIENT
Start: 2025-08-12

## 2025-02-25 RX ADMIN — LORAZEPAM 0.5 MG: 0.5 TABLET ORAL at 10:55

## 2025-02-25 RX ADMIN — SODIUM CHLORIDE 480 MG: 9 INJECTION, SOLUTION INTRAVENOUS at 11:26

## 2025-02-25 RX ADMIN — DEXAMETHASONE SODIUM PHOSPHATE 8 MG: 4 INJECTION INTRA-ARTICULAR; INTRALESIONAL; INTRAMUSCULAR; INTRAVENOUS; SOFT TISSUE at 10:56

## 2025-02-25 ASSESSMENT — ENCOUNTER SYMPTOMS
GASTROINTESTINAL NEGATIVE: 1
CARDIOVASCULAR NEGATIVE: 1
RESPIRATORY NEGATIVE: 1
CONSTITUTIONAL NEGATIVE: 1

## 2025-02-25 ASSESSMENT — PAIN SCALES - GENERAL
PAINLEVEL_OUTOF10: 0 - NO PAIN
PAINLEVEL_OUTOF10: 0-NO PAIN

## 2025-02-25 NOTE — SIGNIFICANT EVENT

## 2025-02-25 NOTE — PROGRESS NOTES
"Music Therapy Note    Jason Burns was referred by Breana Youssef, BRYSON, MT-BC.    Therapy Session  Visit Type: New visit  Session Start Time: 1000  Session End Time: 1030  Intervention Delivery: In-person  Conflict of Service: None  Family Present for Session: None     Pre-assessment  Pain Score: 0 - No pain  Stress Level (0-10): 0  Anxiety Level (0-10): 0  Mood/Affect: Appropriate, Calm  Verbalized Emotional State: Relaxed         Treatment/Interventions  Areas of Focus: Self-expression, Coping  Music Therapy Interventions: Live music listening, Music sharing/discussion  Interruption: No  Patient Fell Asleep at End of Session: No    Post-assessment  0-10 (Numeric) Pain Score: 0 - No pain  Stress Level (0-10): 0  Anxiety Level (0-10): 0  Mood/Affect: Appropriate, Calm  Verbalized Emotional State: Gratitude, Relaxed  Continue Visiting: Yes  Total Session Time (min): 30 minutes    Narrative  Assessment Detail: Upon MT's arrival, pt was awaiting treatment. He remembered MT's previous visit and their conversation. He was agreeable to music therapy services on this occasion. Pt asked if MT had listened to any Mango Granado and they chatted pleasantly about his music and concerts while MT was setting up. He also shared that he and his spouse had just returned from a 2 week cruise and chatted about that as well. Pt had previously shared that his preferred music is rock.  Plan: MT will engage pt in preferred music to provide a positive environment for coping with treatment and for self-expression.  Intervention: Pt shared that he was welcoming of MT and music on this occasion, but that he would likely continue to be on his phone. MT provided reassurance that pt could engage at whatever comfort level felt best. MT presented a variety of music on the guitar, aimed to continue pt's voiced feeling of relaxation. Songs included \"Sweet Marisabel,\" \"Fire and Rain\" by David Jean, \"Cat's in the Cradle,\" by Terence Gonsales, " "\"Every Raegan Has Its Thorn\" by Poison, and \"[place]\" by Moise Sho[place]water. Pt offered comments after each selection, voicing acknowledgement of each song or an anecdote about each one. Pt expressed gratitude for the visit and denied any other needs at this time.  Evaluation: Pt remained relaxed and comfortable throughout the session. He was pleasant and quietly engaged throughout.  Follow-up: MT will follow up as able and appropriate.    Education Documentation  No documentation found.          "

## 2025-02-25 NOTE — PATIENT INSTRUCTIONS
Today you visited with your Oncologist.  Your recent labs were discussed and questions were answered.  Your current treatment regimen was discussed and the plan going forward was also discussed.  Scheduling orders were placed to reflect this conversation.  Please call our office for any questions that may arise after leaving today.   [phone]    Review your schedule upon leaving every infusion visit.  Remember you will no longer see lab visits noted separately on your schedule.    Dr Retana will see you in June with treatment. We have asked scheduling to schedule your CT end of May or early June.

## 2025-02-25 NOTE — PROGRESS NOTES
Patient tolerated infusion well. Patient's lab results and schedule was reviewed with patient and he verbalized understanding.

## 2025-02-25 NOTE — PROGRESS NOTES
Patient ID: Jason Burns is a 64 y.o. male.  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  #2) DM, HTN        History of Present Illness:   Completed SBRT to 50Gy in 5fx to the soft tissue recurrence (5.6cm in max diameter at that time), with COT on 05/19/2023. Patient presents for follow up and treatment. On monthly Opdivo, tolerating well.   Review of Systems   Constitutional: Negative.    HENT:  Negative.     Respiratory: Negative.     Cardiovascular: Negative.    Review of Systems   Constitutional: Negative.    HENT:  Negative.     Respiratory: Negative.     Cardiovascular: Negative.    Gastrointestinal: Negative.         Objective   BSA: There is no height or weight on file to calculate BSA.  There were no vitals taken for this visit.     has a past medical history of Campylobacter enteritis (11/02/2018), Personal history of other diseases of the digestive system (08/11/2016), Plantar fascial fibromatosis (08/19/2015), Renal cell cancer (Multi), and Unspecified sprain of unspecified great toe, initial encounter (08/19/2015).   has a past surgical history that includes Colonoscopy (10/21/2013); Umbilical hernia repair (04/25/2018); US guided needle liver biopsy (10/12/2020); CT angio abdomen pelvis w and or wo IV IV contrast (2/7/2021); and CT angio abdomen pelvis w and or wo IV IV contrast (2/10/2021).  Family History   Problem Relation Name Age of Onset    Pancreatic cancer Brother Jed      Oncology History   Renal cell carcinoma of right kidney (Multi)   10/22/2023 Initial Diagnosis    Renal cell carcinoma of right kidney (CMS/HCC)     10/24/2023 -  Chemotherapy    Nivolumab 480 mg, 28 Day Cycles         Jason SIERRA  Katy  reports that he has never smoked. He has never used smokeless tobacco.  He  reports current alcohol use.  He  reports that he does not currently use drugs.    Physical Exam  Constitutional:       Appearance: Normal appearance.   HENT:      Head: Normocephalic and atraumatic.   Eyes:      Extraocular Movements: Extraocular movements intact.      Pupils: Pupils are equal, round, and reactive to light.   Neurological:      Mental Status: He is alert.         WBC   Date/Time Value Ref Range Status   01/21/2025 09:20 AM 4.6 4.4 - 11.3 x10*3/uL Final   12/24/2024 08:22 AM 8.5 4.4 - 11.3 x10*3/uL Final   11/19/2024 10:03 AM 4.9 4.4 - 11.3 x10*3/uL Final     nRBC   Date Value Ref Range Status   11/21/2023 0.0 0.0 - 0.0 /100 WBCs Final   08/29/2023 CANCELED 0.0 - 0.0 /100 WBC      Comment:     Result canceled by the ancillary.   08/23/2023 CANCELED       Comment:     Result canceled by the ancillary.   05/19/2023 CANCELED       Comment:     Result canceled by the ancillary.     RBC   Date Value Ref Range Status   01/21/2025 5.00 4.50 - 5.90 x10*6/uL Final   12/24/2024 5.38 4.50 - 5.90 x10*6/uL Final   11/19/2024 5.36 4.50 - 5.90 x10*6/uL Final     Hemoglobin   Date Value Ref Range Status   01/21/2025 15.0 13.5 - 17.5 g/dL Final   12/24/2024 15.9 13.5 - 17.5 g/dL Final   11/19/2024 15.8 13.5 - 17.5 g/dL Final     Hematocrit   Date Value Ref Range Status   01/21/2025 44.4 41.0 - 52.0 % Final   12/24/2024 46.8 41.0 - 52.0 % Final   11/19/2024 47.3 41.0 - 52.0 % Final     MCV   Date/Time Value Ref Range Status   01/21/2025 09:20 AM 89 80 - 100 fL Final   12/24/2024 08:22 AM 87 80 - 100 fL Final   11/19/2024 10:03 AM 88 80 - 100 fL Final     MCH   Date/Time Value Ref Range Status   01/21/2025 09:20 AM 30.0 26.0 - 34.0 pg Final   12/24/2024 08:22 AM 29.6 26.0 - 34.0 pg Final   11/19/2024 10:03 AM 29.5 26.0 - 34.0 pg Final     MCHC   Date/Time Value Ref Range Status   01/21/2025 09:20 AM 33.8 32.0 - 36.0 g/dL Final    12/24/2024 08:22 AM 34.0 32.0 - 36.0 g/dL Final   11/19/2024 10:03 AM 33.4 32.0 - 36.0 g/dL Final     RDW   Date/Time Value Ref Range Status   01/21/2025 09:20 AM 12.5 11.5 - 14.5 % Final   12/24/2024 08:22 AM 12.2 11.5 - 14.5 % Final   11/19/2024 10:03 AM 12.9 11.5 - 14.5 % Final     Platelets   Date/Time Value Ref Range Status   01/21/2025 09:20  150 - 450 x10*3/uL Final   12/24/2024 08:22  150 - 450 x10*3/uL Final   11/19/2024 10:03  150 - 450 x10*3/uL Final     MPV   Date/Time Value Ref Range Status   10/24/2023 02:34 PM 9.6 7.5 - 11.5 fL Final     Neutrophils %   Date/Time Value Ref Range Status   01/21/2025 09:20 AM 67.5 40.0 - 80.0 % Final   12/24/2024 08:22 AM 76.4 40.0 - 80.0 % Final   11/19/2024 10:03 AM 64.7 40.0 - 80.0 % Final     Immature Granulocytes %, Automated   Date/Time Value Ref Range Status   01/21/2025 09:20 AM 0.4 0.0 - 0.9 % Final     Comment:     Immature Granulocyte Count (IG) includes promyelocytes, myelocytes and metamyelocytes but does not include bands. Percent differential counts (%) should be interpreted in the context of the absolute cell counts (cells/UL).   12/24/2024 08:22 AM 0.2 0.0 - 0.9 % Final     Comment:     Immature Granulocyte Count (IG) includes promyelocytes, myelocytes and metamyelocytes but does not include bands. Percent differential counts (%) should be interpreted in the context of the absolute cell counts (cells/UL).   11/19/2024 10:03 AM 0.2 0.0 - 0.9 % Final     Comment:     Immature Granulocyte Count (IG) includes promyelocytes, myelocytes and metamyelocytes but does not include bands. Percent differential counts (%) should be interpreted in the context of the absolute cell counts (cells/UL).     Lymphocytes %   Date/Time Value Ref Range Status   01/21/2025 09:20 AM 20.5 13.0 - 44.0 % Final   12/24/2024 08:22 AM 12.6 13.0 - 44.0 % Final   11/19/2024 10:03 AM 21.9 13.0 - 44.0 % Final     Monocytes %   Date/Time Value Ref Range Status    01/21/2025 09:20 AM 6.8 2.0 - 10.0 % Final   12/24/2024 08:22 AM 7.8 2.0 - 10.0 % Final   11/19/2024 10:03 AM 8.5 2.0 - 10.0 % Final     Eosinophils %   Date/Time Value Ref Range Status   01/21/2025 09:20 AM 3.7 0.0 - 6.0 % Final   12/24/2024 08:22 AM 2.5 0.0 - 6.0 % Final   11/19/2024 10:03 AM 3.9 0.0 - 6.0 % Final     Basophils %   Date/Time Value Ref Range Status   01/21/2025 09:20 AM 1.1 0.0 - 2.0 % Final   12/24/2024 08:22 AM 0.5 0.0 - 2.0 % Final   11/19/2024 10:03 AM 0.8 0.0 - 2.0 % Final     Neutrophils Absolute   Date/Time Value Ref Range Status   01/21/2025 09:20 AM 3.10 1.20 - 7.70 x10*3/uL Final     Comment:     Percent differential counts (%) should be interpreted in the context of the absolute cell counts (cells/uL).   12/24/2024 08:22 AM 6.50 1.20 - 7.70 x10*3/uL Final     Comment:     Percent differential counts (%) should be interpreted in the context of the absolute cell counts (cells/uL).   11/19/2024 10:03 AM 3.14 1.20 - 7.70 x10*3/uL Final     Comment:     Percent differential counts (%) should be interpreted in the context of the absolute cell counts (cells/uL).     Immature Granulocytes Absolute, Automated   Date/Time Value Ref Range Status   01/21/2025 09:20 AM 0.02 0.00 - 0.70 x10*3/uL Final   12/24/2024 08:22 AM 0.02 0.00 - 0.70 x10*3/uL Final   11/19/2024 10:03 AM 0.01 0.00 - 0.70 x10*3/uL Final     Lymphocytes Absolute   Date/Time Value Ref Range Status   01/21/2025 09:20 AM 0.94 (L) 1.20 - 4.80 x10*3/uL Final   12/24/2024 08:22 AM 1.07 (L) 1.20 - 4.80 x10*3/uL Final   11/19/2024 10:03 AM 1.06 (L) 1.20 - 4.80 x10*3/uL Final     Monocytes Absolute   Date/Time Value Ref Range Status   01/21/2025 09:20 AM 0.31 0.10 - 1.00 x10*3/uL Final   12/24/2024 08:22 AM 0.66 0.10 - 1.00 x10*3/uL Final   11/19/2024 10:03 AM 0.41 0.10 - 1.00 x10*3/uL Final     Eosinophils Absolute   Date/Time Value Ref Range Status   01/21/2025 09:20 AM 0.17 0.00 - 0.70 x10*3/uL Final   12/24/2024 08:22 AM 0.21 0.00 -  "0.70 x10*3/uL Final   11/19/2024 10:03 AM 0.19 0.00 - 0.70 x10*3/uL Final     Basophils Absolute   Date/Time Value Ref Range Status   01/21/2025 09:20 AM 0.05 0.00 - 0.10 x10*3/uL Final   12/24/2024 08:22 AM 0.04 0.00 - 0.10 x10*3/uL Final   11/19/2024 10:03 AM 0.04 0.00 - 0.10 x10*3/uL Final       No components found for: \"PT\"  aPTT   Date/Time Value Ref Range Status   01/03/2022 06:26 PM 30 26 - 39 sec Final     Comment:     Note new reference range as of 11/30/2021 at 10:00am.   12/29/2021 02:13 AM 26 26 - 39 sec Final     Comment:     Note new reference range as of 11/30/2021 at 10:00am.   12/16/2021 10:11 AM 32 26 - 39 sec Final     Comment:     Note new reference range as of 11/30/2021 at 10:00am.       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 Excellent response to 4 cycles of Ipi/Nivo. Course was complicated pneumonitis that has resolved. As well as occult GI bleed, unknown etiology, resolved.     Now on maintenance Opdivo doing well. CT in 10/2021 showed interval increase in size of a retroperitoneal soft tissue mass at the right nephrectomy bed currently measuring 24 x 29 mm. MRI abd 12/2021 showed enlarging R hepatic lobe and R pericolonic metastases.     S/P surgical resection of residual disease in liver and in nephrectomy bed in 12/2021. Path c/w residual disease retroperitoneal lymph nodes and liver.     Continue Opdivo Monthly.      3/2023:  CT scan noting oligometastatic disease in the right lower quadrant site of nephrectomy. Seen by rad onc and awaiting treatment.     5/9/2023:  Plan patient to continue Opdivo q. 28 days. Noted to have mild hypercalcemia. Plan for hydration and lab check next week. Will also obtain bone scan.     06/06/2023 patient showing oligometastatic disease.  Right lower quadrant site of nephrectomy.  Patient is status post radiation to the surgical bed with very good effect.  He is to continue Opdivo q. 28 days scheduled today.  Cleared " for therapy.     09/26/2023 patient presents today the results of his radiographic imaging indicating a response to treatment reduction in size no new lesions.  This has been explained to patient.  He will continue Opdivo q. 28 daily.  He has no complaints.     12/19/2023: Continue Opdivo every 4 weeks.    6/4/2024: CT CAP:IMPRESSION:  Sarcomatoid carcinoma of kidney restaging scan.  1. No evidence of metastatic disease identified in the chest. 2. Compared to prior CT dated 02/09/2024, there has been no significant interval change in the size of the soft tissue mesenteric implant in the right hemiabdomen with associated postradiation  changes.  3. Redemonstration of postsurgical changes of right posterior partial hepatectomy and right total nephrectomy, without interval increase in the size of small implants in the surgical bed.  4. No evidence of increased abdominopelvic lymphadenopathy.  5. Stable aneurysmal dilatation of the ascending aorta up to 4.6 cm.  6. Additional findings as above.       Signed by: Adrián Bowling 5/9/2024 8/27/2024:  CT CAP: IMPRESSION:   CHEST:   1. No intrathoracic metastatic disease.   2. Stable appearance of the aneurysmal dilatation of the ascending   thoracic aorta measuring 4.6 cm.   3. Stable appearance of the thyroid gland with multiple partially   calcific nodules.       ABDOMEN-PELVIS:   1. Status post radical right nephrectomy/adrenalectomy and partial   right posterior hepatectomy with a stable few subcentimeter tiny   nodules at the surgical bed posteriorly lateral to the right psoas   muscle measuring up to 0.7 cm.   2. Postradiation changes in the right hemiabdomen with a stable few   mesenteric nodules measuring up to 1.3 cm.   3. No new concerning findings in the abdomen or pelvis.        MACRO:   None      Signed by: Sai Paredes 8/13/2024   CT scan as stated above does not show any evidence of disease recurrence.  Will continue Opdivo patient is tolerating it well  with no complications or adverse effects.  Return to clinic in 4 months.     11/19/2024: CT scan 11/18/2024: as stated above does not show any evidence of disease recurrence.  Will continue Opdivo patient is tolerating it well with no complications or adverse effects.  Return to clinic in 4 months.    2/25/2025 : Will continue Opdivo patient is tolerating it well with no complications or adverse effects.  Return to clinic in 6 months.     Diagnoses and all orders for this visit:  Renal cell carcinoma of right kidney (Multi)  -     Clinic Appointment Request Chemo Follow Up  -     CBC and Auto Differential  -     Comprehensive metabolic panel  -     Acth  -     Cortisol Am  -     Tsh With Reflex To Free T4 If Abnormal  -     CBC and Auto Differential; Future  -     Comprehensive metabolic panel; Future  -     Acth; Future  -     Cortisol Am; Future  -     Tsh With Reflex To Free T4 If Abnormal; Future  -     Infusion Appointment Request; Future  -     CBC and Auto Differential; Future  -     Comprehensive metabolic panel; Future  -     Acth; Future  -     Cortisol Am; Future  -     Tsh With Reflex To Free T4 If Abnormal; Future  -     Clinic Appointment Request; Future  -     Infusion Appointment Request; Future  -     CBC and Auto Differential; Future  -     Comprehensive metabolic panel; Future  -     Acth; Future  -     Cortisol Am; Future  -     Tsh With Reflex To Free T4 If Abnormal; Future  -     Infusion Appointment Request; Future  -     CBC and Auto Differential; Future  -     Comprehensive metabolic panel; Future  -     Acth; Future  -     Cortisol Am; Future  -     Tsh With Reflex To Free T4 If Abnormal; Future  -     Clinic Appointment Request; Future  -     Infusion Appointment Request; Future  -     CBC and Auto Differential; Future  -     Comprehensive metabolic panel; Future  -     Acth; Future  -     Cortisol Am; Future  -     Tsh With Reflex To Free T4 If Abnormal; Future  -     Clinic Appointment  Request  Other orders  -     LORazepam (Ativan) tablet 0.5 mg  -     dexAMETHasone (Decadron) injection 8 mg  -     prochlorperazine (Compazine) tablet 10 mg  -     prochlorperazine (Compazine) injection 10 mg  -     nivolumab (Opdivo) 480 mg in sodium chloride 0.9% 148 mL IV  -     sodium chloride 0.9 % bolus 500 mL  -     dextrose 5 % in water (D5W) bolus 500 mL  -     diphenhydrAMINE (BENADryl) injection 50 mg  -     methylPREDNISolone sod succinate (SOLU-Medrol) 40 mg/mL injection 40 mg  -     famotidine PF (Pepcid) injection 20 mg  -     EPINEPHrine (Epipen) injection syringe 0.3 mg  -     albuterol 2.5 mg /3 mL (0.083 %) nebulizer solution 3 mL  -     LORazepam (Ativan) tablet 0.5 mg  -     dexAMETHasone (Decadron) injection 8 mg  -     prochlorperazine (Compazine) tablet 10 mg  -     prochlorperazine (Compazine) injection 10 mg  -     nivolumab (Opdivo) 480 mg in sodium chloride 0.9% 148 mL IV  -     sodium chloride 0.9 % bolus 500 mL  -     dextrose 5 % in water (D5W) bolus 500 mL  -     diphenhydrAMINE (BENADryl) injection 50 mg  -     methylPREDNISolone sod succinate (SOLU-Medrol) 40 mg/mL injection 40 mg  -     famotidine PF (Pepcid) injection 20 mg  -     EPINEPHrine (Epipen) injection syringe 0.3 mg  -     albuterol 2.5 mg /3 mL (0.083 %) nebulizer solution 3 mL  -     LORazepam (Ativan) tablet 0.5 mg  -     dexAMETHasone (Decadron) injection 8 mg  -     prochlorperazine (Compazine) tablet 10 mg  -     prochlorperazine (Compazine) injection 10 mg  -     nivolumab (Opdivo) 480 mg in sodium chloride 0.9% 148 mL IV  -     sodium chloride 0.9 % bolus 500 mL  -     dextrose 5 % in water (D5W) bolus 500 mL  -     diphenhydrAMINE (BENADryl) injection 50 mg  -     methylPREDNISolone sod succinate (SOLU-Medrol) 40 mg/mL injection 40 mg  -     famotidine PF (Pepcid) injection 20 mg  -     EPINEPHrine (Epipen) injection syringe 0.3 mg  -     albuterol 2.5 mg /3 mL (0.083 %) nebulizer solution 3 mL  -     LORazepam  (Ativan) tablet 0.5 mg  -     dexAMETHasone (Decadron) injection 8 mg  -     prochlorperazine (Compazine) tablet 10 mg  -     prochlorperazine (Compazine) injection 10 mg  -     nivolumab (Opdivo) 480 mg in sodium chloride 0.9% 148 mL IV  -     sodium chloride 0.9 % bolus 500 mL  -     dextrose 5 % in water (D5W) bolus 500 mL  -     diphenhydrAMINE (BENADryl) injection 50 mg  -     methylPREDNISolone sod succinate (SOLU-Medrol) 40 mg/mL injection 40 mg  -     famotidine PF (Pepcid) injection 20 mg  -     EPINEPHrine (Epipen) injection syringe 0.3 mg  -     albuterol 2.5 mg /3 mL (0.083 %) nebulizer solution 3 mL  -     LORazepam (Ativan) tablet 0.5 mg  -     dexAMETHasone (Decadron) injection 8 mg  -     prochlorperazine (Compazine) tablet 10 mg  -     prochlorperazine (Compazine) injection 10 mg  -     nivolumab (Opdivo) 480 mg in sodium chloride 0.9% 148 mL IV  -     sodium chloride 0.9 % bolus 500 mL  -     dextrose 5 % in water (D5W) bolus 500 mL  -     diphenhydrAMINE (BENADryl) injection 50 mg  -     methylPREDNISolone sod succinate (SOLU-Medrol) 40 mg/mL injection 40 mg  -     famotidine PF (Pepcid) injection 20 mg  -     EPINEPHrine (Epipen) injection syringe 0.3 mg  -     albuterol 2.5 mg /3 mL (0.083 %) nebulizer solution 3 mL           Kelvin Retana MD

## 2025-02-27 LAB — ACTH PLAS-MCNC: 69 PG/ML (ref 7.2–63.3)

## 2025-03-25 ENCOUNTER — INFUSION (OUTPATIENT)
Dept: HEMATOLOGY/ONCOLOGY | Facility: CLINIC | Age: 65
End: 2025-03-25
Payer: COMMERCIAL

## 2025-03-25 ENCOUNTER — APPOINTMENT (OUTPATIENT)
Dept: HEMATOLOGY/ONCOLOGY | Facility: CLINIC | Age: 65
End: 2025-03-25
Payer: COMMERCIAL

## 2025-03-25 VITALS
HEART RATE: 87 BPM | OXYGEN SATURATION: 95 % | BODY MASS INDEX: 39.47 KG/M2 | SYSTOLIC BLOOD PRESSURE: 113 MMHG | WEIGHT: 271.1 LBS | DIASTOLIC BLOOD PRESSURE: 76 MMHG | RESPIRATION RATE: 16 BRPM | TEMPERATURE: 97.5 F

## 2025-03-25 DIAGNOSIS — C64.1 RENAL CELL CARCINOMA OF RIGHT KIDNEY: ICD-10-CM

## 2025-03-25 LAB
ALBUMIN SERPL BCP-MCNC: 4.2 G/DL (ref 3.4–5)
ALP SERPL-CCNC: 64 U/L (ref 33–136)
ALT SERPL W P-5'-P-CCNC: 17 U/L (ref 10–52)
ANION GAP SERPL CALC-SCNC: 12 MMOL/L (ref 10–20)
AST SERPL W P-5'-P-CCNC: 13 U/L (ref 9–39)
BASOPHILS # BLD AUTO: 0.04 X10*3/UL (ref 0–0.1)
BASOPHILS NFR BLD AUTO: 0.9 %
BILIRUB SERPL-MCNC: 0.6 MG/DL (ref 0–1.2)
BUN SERPL-MCNC: 19 MG/DL (ref 6–23)
CALCIUM SERPL-MCNC: 9.2 MG/DL (ref 8.6–10.3)
CHLORIDE SERPL-SCNC: 101 MMOL/L (ref 98–107)
CO2 SERPL-SCNC: 28 MMOL/L (ref 21–32)
CORTIS AM PEAK SERPL-MSCNC: 11.6 UG/DL (ref 5–20)
CREAT SERPL-MCNC: 1.48 MG/DL (ref 0.5–1.3)
EGFRCR SERPLBLD CKD-EPI 2021: 53 ML/MIN/1.73M*2
EOSINOPHIL # BLD AUTO: 0.24 X10*3/UL (ref 0–0.7)
EOSINOPHIL NFR BLD AUTO: 5.1 %
ERYTHROCYTE [DISTWIDTH] IN BLOOD BY AUTOMATED COUNT: 13 % (ref 11.5–14.5)
GLUCOSE SERPL-MCNC: 293 MG/DL (ref 74–99)
HCT VFR BLD AUTO: 46.1 % (ref 41–52)
HGB BLD-MCNC: 15.4 G/DL (ref 13.5–17.5)
IMM GRANULOCYTES # BLD AUTO: 0.01 X10*3/UL (ref 0–0.7)
IMM GRANULOCYTES NFR BLD AUTO: 0.2 % (ref 0–0.9)
LYMPHOCYTES # BLD AUTO: 0.92 X10*3/UL (ref 1.2–4.8)
LYMPHOCYTES NFR BLD AUTO: 19.7 %
MCH RBC QN AUTO: 29.6 PG (ref 26–34)
MCHC RBC AUTO-ENTMCNC: 33.4 G/DL (ref 32–36)
MCV RBC AUTO: 89 FL (ref 80–100)
MONOCYTES # BLD AUTO: 0.36 X10*3/UL (ref 0.1–1)
MONOCYTES NFR BLD AUTO: 7.7 %
NEUTROPHILS # BLD AUTO: 3.1 X10*3/UL (ref 1.2–7.7)
NEUTROPHILS NFR BLD AUTO: 66.4 %
PLATELET # BLD AUTO: 191 X10*3/UL (ref 150–450)
POTASSIUM SERPL-SCNC: 4 MMOL/L (ref 3.5–5.3)
PROT SERPL-MCNC: 7.1 G/DL (ref 6.4–8.2)
RBC # BLD AUTO: 5.21 X10*6/UL (ref 4.5–5.9)
SODIUM SERPL-SCNC: 137 MMOL/L (ref 136–145)
WBC # BLD AUTO: 4.7 X10*3/UL (ref 4.4–11.3)

## 2025-03-25 PROCEDURE — 96413 CHEMO IV INFUSION 1 HR: CPT

## 2025-03-25 PROCEDURE — 96375 TX/PRO/DX INJ NEW DRUG ADDON: CPT | Mod: INF

## 2025-03-25 PROCEDURE — 85025 COMPLETE CBC W/AUTO DIFF WBC: CPT

## 2025-03-25 PROCEDURE — 2500000001 HC RX 250 WO HCPCS SELF ADMINISTERED DRUGS (ALT 637 FOR MEDICARE OP): Performed by: INTERNAL MEDICINE

## 2025-03-25 PROCEDURE — 2500000004 HC RX 250 GENERAL PHARMACY W/ HCPCS (ALT 636 FOR OP/ED): Performed by: INTERNAL MEDICINE

## 2025-03-25 PROCEDURE — 2500000004 HC RX 250 GENERAL PHARMACY W/ HCPCS (ALT 636 FOR OP/ED): Mod: JZ | Performed by: INTERNAL MEDICINE

## 2025-03-25 PROCEDURE — 82024 ASSAY OF ACTH: CPT

## 2025-03-25 PROCEDURE — 82533 TOTAL CORTISOL: CPT | Mod: GEALAB

## 2025-03-25 PROCEDURE — 80053 COMPREHEN METABOLIC PANEL: CPT

## 2025-03-25 RX ORDER — HEPARIN SODIUM,PORCINE/PF 10 UNIT/ML
50 SYRINGE (ML) INTRAVENOUS AS NEEDED
OUTPATIENT
Start: 2025-03-25

## 2025-03-25 RX ORDER — EPINEPHRINE 0.3 MG/.3ML
0.3 INJECTION SUBCUTANEOUS EVERY 5 MIN PRN
Status: DISCONTINUED | OUTPATIENT
Start: 2025-03-25 | End: 2025-03-25 | Stop reason: HOSPADM

## 2025-03-25 RX ORDER — PROCHLORPERAZINE EDISYLATE 5 MG/ML
10 INJECTION INTRAMUSCULAR; INTRAVENOUS EVERY 6 HOURS PRN
Status: DISCONTINUED | OUTPATIENT
Start: 2025-03-25 | End: 2025-03-25 | Stop reason: HOSPADM

## 2025-03-25 RX ORDER — HEPARIN SODIUM,PORCINE/PF 10 UNIT/ML
50 SYRINGE (ML) INTRAVENOUS AS NEEDED
Status: DISCONTINUED | OUTPATIENT
Start: 2025-03-25 | End: 2025-03-25 | Stop reason: HOSPADM

## 2025-03-25 RX ORDER — LORAZEPAM 0.5 MG/1
0.5 TABLET ORAL ONCE
Status: COMPLETED | OUTPATIENT
Start: 2025-03-25 | End: 2025-03-25

## 2025-03-25 RX ORDER — PROCHLORPERAZINE MALEATE 10 MG
10 TABLET ORAL EVERY 6 HOURS PRN
Status: DISCONTINUED | OUTPATIENT
Start: 2025-03-25 | End: 2025-03-25 | Stop reason: HOSPADM

## 2025-03-25 RX ORDER — HEPARIN 100 UNIT/ML
500 SYRINGE INTRAVENOUS AS NEEDED
Status: DISCONTINUED | OUTPATIENT
Start: 2025-03-25 | End: 2025-03-25 | Stop reason: HOSPADM

## 2025-03-25 RX ORDER — FAMOTIDINE 10 MG/ML
20 INJECTION, SOLUTION INTRAVENOUS ONCE AS NEEDED
Status: DISCONTINUED | OUTPATIENT
Start: 2025-03-25 | End: 2025-03-25 | Stop reason: HOSPADM

## 2025-03-25 RX ORDER — DIPHENHYDRAMINE HYDROCHLORIDE 50 MG/ML
50 INJECTION, SOLUTION INTRAMUSCULAR; INTRAVENOUS AS NEEDED
Status: DISCONTINUED | OUTPATIENT
Start: 2025-03-25 | End: 2025-03-25 | Stop reason: HOSPADM

## 2025-03-25 RX ORDER — ALBUTEROL SULFATE 0.83 MG/ML
3 SOLUTION RESPIRATORY (INHALATION) AS NEEDED
Status: DISCONTINUED | OUTPATIENT
Start: 2025-03-25 | End: 2025-03-25 | Stop reason: HOSPADM

## 2025-03-25 RX ORDER — HEPARIN 100 UNIT/ML
500 SYRINGE INTRAVENOUS AS NEEDED
OUTPATIENT
Start: 2025-03-25

## 2025-03-25 RX ADMIN — LORAZEPAM 0.5 MG: 0.5 TABLET ORAL at 11:16

## 2025-03-25 RX ADMIN — SODIUM CHLORIDE 480 MG: 9 INJECTION, SOLUTION INTRAVENOUS at 11:26

## 2025-03-25 RX ADMIN — DEXAMETHASONE SODIUM PHOSPHATE 8 MG: 4 INJECTION INTRA-ARTICULAR; INTRALESIONAL; INTRAMUSCULAR; INTRAVENOUS; SOFT TISSUE at 11:16

## 2025-03-25 ASSESSMENT — PAIN SCALES - GENERAL: PAINLEVEL_OUTOF10: 0-NO PAIN

## 2025-03-25 NOTE — PROGRESS NOTES
"Music Therapy Note    Jason Burns was referred by Breana Youssef, LPMT, MT-BC.    Therapy Session  Visit Type: New visit  Session Start Time: 1058  Session End Time: 1102  Intervention Delivery: In-person  Conflict of Service: None  Family Present for Session: None     Pre-assessment  Mood/Affect: Appropriate         Treatment/Interventions  Music Therapy Interventions: Assessment  Interruption: No    Post-assessment  Unable to Assess Reason: Did not provide expressive therapy intervention  Mood/Affect: Appropriate  Continue Visiting: Yes  Total Session Time (min): 4 minutes    Narrative  Assessment Detail: Pt was receiving infusion when approached by MT. He was pleasant and easily engaged, although visibly tired. Pt stated that he was just finishing something on his iPad and was preparing to \"kick back\" and go to sleep. He declined music therapy services on this occasion, although he thanked MT for stopping by. Pt encouraged to reach out with any questions or if he wanted music therapy later on during his appointment. He denied any other needs at this time.  Follow-up: MT will follow up as able and appropriate.    Education Documentation  No documentation found.          "

## 2025-03-25 NOTE — SIGNIFICANT EVENT

## 2025-03-25 NOTE — PROGRESS NOTES
Patient recieved opdivo infusion with no s/s of reaction or complication. Gave and reviewed lab results and schedule with patient. Patient left infusion independently in stable condition.

## 2025-03-25 NOTE — PROGRESS NOTES
Learner: patient  Educated on: Opdivo and side effects  Readiness: acceptance  Preferred learning method: preferred: reading and listening  Method used: explanation  Response: demonstrated understanding and verbalizes understanding  Barriers: None  Preferred language: English

## 2025-03-26 LAB — ACTH PLAS-MCNC: 54.5 PG/ML (ref 7.2–63.3)

## 2025-04-22 ENCOUNTER — INFUSION (OUTPATIENT)
Dept: HEMATOLOGY/ONCOLOGY | Facility: CLINIC | Age: 65
End: 2025-04-22
Payer: COMMERCIAL

## 2025-04-22 ENCOUNTER — APPOINTMENT (OUTPATIENT)
Dept: HEMATOLOGY/ONCOLOGY | Facility: CLINIC | Age: 65
End: 2025-04-22
Payer: COMMERCIAL

## 2025-04-22 VITALS
HEART RATE: 90 BPM | RESPIRATION RATE: 17 BRPM | TEMPERATURE: 97.7 F | DIASTOLIC BLOOD PRESSURE: 94 MMHG | BODY MASS INDEX: 39.77 KG/M2 | SYSTOLIC BLOOD PRESSURE: 157 MMHG | OXYGEN SATURATION: 96 % | WEIGHT: 273.15 LBS

## 2025-04-22 DIAGNOSIS — C64.1 RENAL CELL CARCINOMA OF RIGHT KIDNEY: ICD-10-CM

## 2025-04-22 LAB
ALBUMIN SERPL BCP-MCNC: 4 G/DL (ref 3.4–5)
ALP SERPL-CCNC: 55 U/L (ref 33–136)
ALT SERPL W P-5'-P-CCNC: 17 U/L (ref 10–52)
ANION GAP SERPL CALC-SCNC: 12 MMOL/L (ref 10–20)
AST SERPL W P-5'-P-CCNC: 14 U/L (ref 9–39)
BASOPHILS # BLD AUTO: 0.06 X10*3/UL (ref 0–0.1)
BASOPHILS NFR BLD AUTO: 1.3 %
BILIRUB SERPL-MCNC: 0.5 MG/DL (ref 0–1.2)
BUN SERPL-MCNC: 16 MG/DL (ref 6–23)
CALCIUM SERPL-MCNC: 9.4 MG/DL (ref 8.6–10.3)
CHLORIDE SERPL-SCNC: 102 MMOL/L (ref 98–107)
CO2 SERPL-SCNC: 25 MMOL/L (ref 21–32)
CORTIS AM PEAK SERPL-MSCNC: 9.8 UG/DL (ref 5–20)
CREAT SERPL-MCNC: 1.27 MG/DL (ref 0.5–1.3)
EGFRCR SERPLBLD CKD-EPI 2021: 63 ML/MIN/1.73M*2
EOSINOPHIL # BLD AUTO: 0.2 X10*3/UL (ref 0–0.7)
EOSINOPHIL NFR BLD AUTO: 4.3 %
ERYTHROCYTE [DISTWIDTH] IN BLOOD BY AUTOMATED COUNT: 12.5 % (ref 11.5–14.5)
GLUCOSE SERPL-MCNC: 228 MG/DL (ref 74–99)
HCT VFR BLD AUTO: 45.2 % (ref 41–52)
HGB BLD-MCNC: 15.2 G/DL (ref 13.5–17.5)
IMM GRANULOCYTES # BLD AUTO: 0.02 X10*3/UL (ref 0–0.7)
IMM GRANULOCYTES NFR BLD AUTO: 0.4 % (ref 0–0.9)
LYMPHOCYTES # BLD AUTO: 1.04 X10*3/UL (ref 1.2–4.8)
LYMPHOCYTES NFR BLD AUTO: 22.5 %
MCH RBC QN AUTO: 29.5 PG (ref 26–34)
MCHC RBC AUTO-ENTMCNC: 33.6 G/DL (ref 32–36)
MCV RBC AUTO: 88 FL (ref 80–100)
MONOCYTES # BLD AUTO: 0.31 X10*3/UL (ref 0.1–1)
MONOCYTES NFR BLD AUTO: 6.7 %
NEUTROPHILS # BLD AUTO: 2.99 X10*3/UL (ref 1.2–7.7)
NEUTROPHILS NFR BLD AUTO: 64.8 %
PLATELET # BLD AUTO: 180 X10*3/UL (ref 150–450)
POTASSIUM SERPL-SCNC: 3.7 MMOL/L (ref 3.5–5.3)
PROT SERPL-MCNC: 7.2 G/DL (ref 6.4–8.2)
RBC # BLD AUTO: 5.15 X10*6/UL (ref 4.5–5.9)
SODIUM SERPL-SCNC: 135 MMOL/L (ref 136–145)
TSH SERPL-ACNC: 0.77 MIU/L (ref 0.44–3.98)
WBC # BLD AUTO: 4.6 X10*3/UL (ref 4.4–11.3)

## 2025-04-22 PROCEDURE — 84075 ASSAY ALKALINE PHOSPHATASE: CPT

## 2025-04-22 PROCEDURE — 85025 COMPLETE CBC W/AUTO DIFF WBC: CPT

## 2025-04-22 PROCEDURE — 84443 ASSAY THYROID STIM HORMONE: CPT

## 2025-04-22 PROCEDURE — 96413 CHEMO IV INFUSION 1 HR: CPT

## 2025-04-22 PROCEDURE — 2500000001 HC RX 250 WO HCPCS SELF ADMINISTERED DRUGS (ALT 637 FOR MEDICARE OP): Performed by: INTERNAL MEDICINE

## 2025-04-22 PROCEDURE — 82024 ASSAY OF ACTH: CPT

## 2025-04-22 PROCEDURE — 2500000004 HC RX 250 GENERAL PHARMACY W/ HCPCS (ALT 636 FOR OP/ED): Mod: JZ | Performed by: INTERNAL MEDICINE

## 2025-04-22 PROCEDURE — 82533 TOTAL CORTISOL: CPT | Mod: GEALAB

## 2025-04-22 PROCEDURE — 2500000004 HC RX 250 GENERAL PHARMACY W/ HCPCS (ALT 636 FOR OP/ED): Performed by: INTERNAL MEDICINE

## 2025-04-22 PROCEDURE — 96375 TX/PRO/DX INJ NEW DRUG ADDON: CPT | Mod: INF

## 2025-04-22 RX ORDER — PROCHLORPERAZINE MALEATE 10 MG
10 TABLET ORAL EVERY 6 HOURS PRN
Status: DISCONTINUED | OUTPATIENT
Start: 2025-04-22 | End: 2025-04-22 | Stop reason: HOSPADM

## 2025-04-22 RX ORDER — EPINEPHRINE 0.3 MG/.3ML
0.3 INJECTION SUBCUTANEOUS EVERY 5 MIN PRN
Status: DISCONTINUED | OUTPATIENT
Start: 2025-04-22 | End: 2025-04-22 | Stop reason: HOSPADM

## 2025-04-22 RX ORDER — PROCHLORPERAZINE EDISYLATE 5 MG/ML
10 INJECTION INTRAMUSCULAR; INTRAVENOUS EVERY 6 HOURS PRN
Status: DISCONTINUED | OUTPATIENT
Start: 2025-04-22 | End: 2025-04-22 | Stop reason: HOSPADM

## 2025-04-22 RX ORDER — ALBUTEROL SULFATE 0.83 MG/ML
3 SOLUTION RESPIRATORY (INHALATION) AS NEEDED
Status: DISCONTINUED | OUTPATIENT
Start: 2025-04-22 | End: 2025-04-22 | Stop reason: HOSPADM

## 2025-04-22 RX ORDER — DIPHENHYDRAMINE HYDROCHLORIDE 50 MG/ML
50 INJECTION, SOLUTION INTRAMUSCULAR; INTRAVENOUS AS NEEDED
Status: DISCONTINUED | OUTPATIENT
Start: 2025-04-22 | End: 2025-04-22 | Stop reason: HOSPADM

## 2025-04-22 RX ORDER — LORAZEPAM 0.5 MG/1
0.5 TABLET ORAL ONCE
Status: COMPLETED | OUTPATIENT
Start: 2025-04-22 | End: 2025-04-22

## 2025-04-22 RX ORDER — FAMOTIDINE 10 MG/ML
20 INJECTION, SOLUTION INTRAVENOUS ONCE AS NEEDED
Status: DISCONTINUED | OUTPATIENT
Start: 2025-04-22 | End: 2025-04-22 | Stop reason: HOSPADM

## 2025-04-22 RX ADMIN — LORAZEPAM 0.5 MG: 0.5 TABLET ORAL at 11:46

## 2025-04-22 RX ADMIN — DEXAMETHASONE SODIUM PHOSPHATE 8 MG: 4 INJECTION, SOLUTION INTRA-ARTICULAR; INTRALESIONAL; INTRAMUSCULAR; INTRAVENOUS; SOFT TISSUE at 11:47

## 2025-04-22 RX ADMIN — SODIUM CHLORIDE 480 MG: 900 INJECTION, SOLUTION INTRAVENOUS at 12:27

## 2025-04-22 ASSESSMENT — PAIN SCALES - GENERAL: PAINLEVEL_OUTOF10: 0-NO PAIN

## 2025-04-22 NOTE — PROGRESS NOTES
Patient tolerated Nivolumab infusion well. Reviewed patient's lab results and schedule with patient and he verbalized understanding of when to return for next infusion appointment.

## 2025-04-22 NOTE — SIGNIFICANT EVENT

## 2025-04-24 LAB — ACTH PLAS-MCNC: 76.2 PG/ML (ref 7.2–63.3)

## 2025-05-20 ENCOUNTER — INFUSION (OUTPATIENT)
Dept: HEMATOLOGY/ONCOLOGY | Facility: CLINIC | Age: 65
End: 2025-05-20
Payer: COMMERCIAL

## 2025-05-20 ENCOUNTER — HOSPITAL ENCOUNTER (OUTPATIENT)
Dept: RADIOLOGY | Facility: HOSPITAL | Age: 65
Discharge: HOME | End: 2025-05-20
Payer: COMMERCIAL

## 2025-05-20 VITALS
TEMPERATURE: 97.9 F | OXYGEN SATURATION: 97 % | RESPIRATION RATE: 16 BRPM | HEART RATE: 82 BPM | SYSTOLIC BLOOD PRESSURE: 144 MMHG | DIASTOLIC BLOOD PRESSURE: 87 MMHG | BODY MASS INDEX: 38.97 KG/M2 | WEIGHT: 267.64 LBS

## 2025-05-20 DIAGNOSIS — C64.1 RENAL CELL CARCINOMA OF RIGHT KIDNEY: ICD-10-CM

## 2025-05-20 LAB
ALBUMIN SERPL BCP-MCNC: 4 G/DL (ref 3.4–5)
ALP SERPL-CCNC: 64 U/L (ref 33–136)
ALT SERPL W P-5'-P-CCNC: 16 U/L (ref 10–52)
ANION GAP SERPL CALC-SCNC: 12 MMOL/L (ref 10–20)
AST SERPL W P-5'-P-CCNC: 16 U/L (ref 9–39)
BASOPHILS # BLD AUTO: 0.06 X10*3/UL (ref 0–0.1)
BASOPHILS NFR BLD AUTO: 1.1 %
BILIRUB SERPL-MCNC: 0.4 MG/DL (ref 0–1.2)
BUN SERPL-MCNC: 18 MG/DL (ref 6–23)
CALCIUM SERPL-MCNC: 8.9 MG/DL (ref 8.6–10.3)
CHLORIDE SERPL-SCNC: 99 MMOL/L (ref 98–107)
CO2 SERPL-SCNC: 26 MMOL/L (ref 21–32)
CORTIS AM PEAK SERPL-MSCNC: 15.3 UG/DL (ref 5–20)
CREAT SERPL-MCNC: 1.4 MG/DL (ref 0.5–1.3)
EGFRCR SERPLBLD CKD-EPI 2021: 56 ML/MIN/1.73M*2
EOSINOPHIL # BLD AUTO: 0.25 X10*3/UL (ref 0–0.7)
EOSINOPHIL NFR BLD AUTO: 4.6 %
ERYTHROCYTE [DISTWIDTH] IN BLOOD BY AUTOMATED COUNT: 12.4 % (ref 11.5–14.5)
GLUCOSE SERPL-MCNC: 242 MG/DL (ref 74–99)
HCT VFR BLD AUTO: 45.4 % (ref 41–52)
HGB BLD-MCNC: 15.3 G/DL (ref 13.5–17.5)
IMM GRANULOCYTES # BLD AUTO: 0.01 X10*3/UL (ref 0–0.7)
IMM GRANULOCYTES NFR BLD AUTO: 0.2 % (ref 0–0.9)
LYMPHOCYTES # BLD AUTO: 1.11 X10*3/UL (ref 1.2–4.8)
LYMPHOCYTES NFR BLD AUTO: 20.3 %
MCH RBC QN AUTO: 29.7 PG (ref 26–34)
MCHC RBC AUTO-ENTMCNC: 33.7 G/DL (ref 32–36)
MCV RBC AUTO: 88 FL (ref 80–100)
MONOCYTES # BLD AUTO: 0.43 X10*3/UL (ref 0.1–1)
MONOCYTES NFR BLD AUTO: 7.9 %
NEUTROPHILS # BLD AUTO: 3.61 X10*3/UL (ref 1.2–7.7)
NEUTROPHILS NFR BLD AUTO: 65.9 %
PLATELET # BLD AUTO: 192 X10*3/UL (ref 150–450)
POTASSIUM SERPL-SCNC: 4 MMOL/L (ref 3.5–5.3)
PROT SERPL-MCNC: 6.5 G/DL (ref 6.4–8.2)
RBC # BLD AUTO: 5.16 X10*6/UL (ref 4.5–5.9)
SODIUM SERPL-SCNC: 133 MMOL/L (ref 136–145)
TSH SERPL-ACNC: 0.71 MIU/L (ref 0.44–3.98)
WBC # BLD AUTO: 5.5 X10*3/UL (ref 4.4–11.3)

## 2025-05-20 PROCEDURE — 2550000001 HC RX 255 CONTRASTS: Performed by: RADIOLOGY

## 2025-05-20 PROCEDURE — 96375 TX/PRO/DX INJ NEW DRUG ADDON: CPT | Mod: INF

## 2025-05-20 PROCEDURE — 2500000004 HC RX 250 GENERAL PHARMACY W/ HCPCS (ALT 636 FOR OP/ED): Mod: JZ | Performed by: INTERNAL MEDICINE

## 2025-05-20 PROCEDURE — 2500000004 HC RX 250 GENERAL PHARMACY W/ HCPCS (ALT 636 FOR OP/ED): Performed by: INTERNAL MEDICINE

## 2025-05-20 PROCEDURE — 74177 CT ABD & PELVIS W/CONTRAST: CPT

## 2025-05-20 PROCEDURE — 85025 COMPLETE CBC W/AUTO DIFF WBC: CPT

## 2025-05-20 PROCEDURE — 82533 TOTAL CORTISOL: CPT | Mod: GEALAB

## 2025-05-20 PROCEDURE — 2500000001 HC RX 250 WO HCPCS SELF ADMINISTERED DRUGS (ALT 637 FOR MEDICARE OP): Performed by: INTERNAL MEDICINE

## 2025-05-20 PROCEDURE — 80053 COMPREHEN METABOLIC PANEL: CPT

## 2025-05-20 PROCEDURE — 82024 ASSAY OF ACTH: CPT

## 2025-05-20 PROCEDURE — 84443 ASSAY THYROID STIM HORMONE: CPT

## 2025-05-20 PROCEDURE — 96413 CHEMO IV INFUSION 1 HR: CPT

## 2025-05-20 RX ORDER — PROCHLORPERAZINE MALEATE 10 MG
10 TABLET ORAL EVERY 6 HOURS PRN
Status: DISCONTINUED | OUTPATIENT
Start: 2025-05-20 | End: 2025-05-20 | Stop reason: HOSPADM

## 2025-05-20 RX ORDER — ALBUTEROL SULFATE 0.83 MG/ML
3 SOLUTION RESPIRATORY (INHALATION) AS NEEDED
Status: DISCONTINUED | OUTPATIENT
Start: 2025-05-20 | End: 2025-05-20 | Stop reason: HOSPADM

## 2025-05-20 RX ORDER — DIPHENHYDRAMINE HYDROCHLORIDE 50 MG/ML
50 INJECTION, SOLUTION INTRAMUSCULAR; INTRAVENOUS AS NEEDED
Status: DISCONTINUED | OUTPATIENT
Start: 2025-05-20 | End: 2025-05-20 | Stop reason: HOSPADM

## 2025-05-20 RX ORDER — HEPARIN 100 UNIT/ML
500 SYRINGE INTRAVENOUS AS NEEDED
Status: DISCONTINUED | OUTPATIENT
Start: 2025-05-20 | End: 2025-05-20 | Stop reason: HOSPADM

## 2025-05-20 RX ORDER — HEPARIN SODIUM,PORCINE/PF 10 UNIT/ML
50 SYRINGE (ML) INTRAVENOUS AS NEEDED
OUTPATIENT
Start: 2025-05-20

## 2025-05-20 RX ORDER — EPINEPHRINE 0.3 MG/.3ML
0.3 INJECTION SUBCUTANEOUS EVERY 5 MIN PRN
Status: DISCONTINUED | OUTPATIENT
Start: 2025-05-20 | End: 2025-05-20 | Stop reason: HOSPADM

## 2025-05-20 RX ORDER — FAMOTIDINE 10 MG/ML
20 INJECTION, SOLUTION INTRAVENOUS ONCE AS NEEDED
Status: DISCONTINUED | OUTPATIENT
Start: 2025-05-20 | End: 2025-05-20 | Stop reason: HOSPADM

## 2025-05-20 RX ORDER — LORAZEPAM 0.5 MG/1
0.5 TABLET ORAL ONCE
Status: COMPLETED | OUTPATIENT
Start: 2025-05-20 | End: 2025-05-20

## 2025-05-20 RX ORDER — HEPARIN 100 UNIT/ML
500 SYRINGE INTRAVENOUS AS NEEDED
OUTPATIENT
Start: 2025-05-20

## 2025-05-20 RX ORDER — PROCHLORPERAZINE EDISYLATE 5 MG/ML
10 INJECTION INTRAMUSCULAR; INTRAVENOUS EVERY 6 HOURS PRN
Status: DISCONTINUED | OUTPATIENT
Start: 2025-05-20 | End: 2025-05-20 | Stop reason: HOSPADM

## 2025-05-20 RX ORDER — HEPARIN SODIUM,PORCINE/PF 10 UNIT/ML
50 SYRINGE (ML) INTRAVENOUS AS NEEDED
Status: DISCONTINUED | OUTPATIENT
Start: 2025-05-20 | End: 2025-05-20 | Stop reason: HOSPADM

## 2025-05-20 RX ADMIN — LORAZEPAM 0.5 MG: 0.5 TABLET ORAL at 10:38

## 2025-05-20 RX ADMIN — SODIUM CHLORIDE 480 MG: 900 INJECTION, SOLUTION INTRAVENOUS at 10:55

## 2025-05-20 RX ADMIN — DEXAMETHASONE SODIUM PHOSPHATE 8 MG: 4 INJECTION INTRA-ARTICULAR; INTRALESIONAL; INTRAMUSCULAR; INTRAVENOUS; SOFT TISSUE at 10:38

## 2025-05-20 RX ADMIN — IOHEXOL 75 ML: 350 INJECTION, SOLUTION INTRAVENOUS at 09:10

## 2025-05-20 ASSESSMENT — PAIN SCALES - GENERAL: PAINLEVEL_OUTOF10: 0-NO PAIN

## 2025-05-20 NOTE — PROGRESS NOTES
Patient recieved Opdivo Infusion with no s/s of reaction or complication. Gave and reviewed lab results and schedule with patient. Addressed questions and concerns. Patient left infusion independently in stable condition.

## 2025-05-20 NOTE — SIGNIFICANT EVENT

## 2025-05-22 LAB — ACTH PLAS-MCNC: 86.6 PG/ML (ref 7.2–63.3)

## 2025-05-27 ENCOUNTER — APPOINTMENT (OUTPATIENT)
Dept: RADIOLOGY | Facility: HOSPITAL | Age: 65
End: 2025-05-27
Payer: COMMERCIAL

## 2025-05-27 ENCOUNTER — APPOINTMENT (OUTPATIENT)
Dept: RADIATION ONCOLOGY | Facility: HOSPITAL | Age: 65
End: 2025-05-27
Payer: COMMERCIAL

## 2025-05-29 NOTE — PROGRESS NOTES
Staff Physician: Peter Arora MD  Referring Physician: Peter Arora MD  Date of Service: 6/2/2025  RADIATION ONCOLOGY FOLLOW UP NOTE:  IDENTIFYING DATA:   Cancer Staging   No matching staging information was found for the patient.    Problem List Items Addressed This Visit       Renal cell carcinoma of right kidney - Primary    Relevant Orders    Clinic Appointment Request Follow Up; CHERYL PETER ISAAC; [facility] (Completed)    Clinic Appointment Request Follow Up; CHERYLWILLAINAMPARO ISAAC; [facility] [facility]    CT chest abdomen pelvis w IV contrast       Mr. Burns is a 64-year-old man with originally [id number] Stage III renal cell carcinoma, status post right nephrectomy, with subsequent recurrence at the resection bed with direct extension to the liver, status-post resection and partial hepatectomy,  then placed on immunotherapy, who developed a second recurrence in the soft tissue at the nephrectomy bed. He then completed SBRT to 50Gy in 5fx to the soft tissue recurrence (5.6cm in max diameter at that time), with COT on 05/19/2023.   He presents today for routine interval follow-up.    INTERVAL HISTORY:  Since we last saw Jason Burns in clinic 6mo ago, he has felt great.  His weight is stable from last visit and his appetite is unchanged. He denies any significant symptoms at this time. Specifically, he denies abdominal pain, pain after eating, nausea, vomiting, early satiety, tarry or sticky stool, or blood in stool. He has had no new medical problems or medications since our last visit.     His last imaging, comprising CT-CAP on 5/20/2025, demonstrates stable and potentially further downsizing of his treated lesion with no evidence of any active disease. He is on opdivo Q4 weeks, last given 5/20/25. Reports some fatigue for ~1 day after opdivo, no other major complaints. Cr stable with most recent 1.40 drawn on 5/20/25 (has ranged 1.34 - 1.61 over past year).    PAST MEDICAL, SURGICAL, FAMILY,  AND SOCIAL HISTORY:  Past Medical History:   Diagnosis Date    Campylobacter enteritis 2018    Campylobacter gastroenteritis    Personal history of other diseases of the digestive system 2016    History of umbilical hernia    Plantar fascial fibromatosis 2015    Plantar fasciitis    Renal cell cancer (Multi)     Unspecified sprain of unspecified great toe, initial encounter 2015    Sprain of great toe     Past Surgical History:   Procedure Laterality Date    COLONOSCOPY  10/21/2013    Complete Colonoscopy    CT ABDOMEN PELVIS ANGIOGRAM W AND/OR WO IV CONTRAST  2021    CT ABDOMEN PELVIS ANGIOGRAM W AND/OR WO IV CONTRAST 2021 [facility]    CT ABDOMEN PELVIS ANGIOGRAM W AND/OR WO IV CONTRAST  2/10/2021    CT ABDOMEN PELVIS ANGIOGRAM W AND/OR WO IV CONTRAST 2/10/2021 [facility]    UMBILICAL HERNIA REPAIR  2018    Umbilical Hernia Repair    US GUIDED NEEDLE LIVER BIOPSY  10/12/2020    US GUIDED NEEDLE LIVER BIOPSY 10/12/2020 [facility]     ALLERGIES:  No Known Allergies  MEDICATIONS:    Current Outpatient Medications:     cyanocobalamin (Vitamin B-12) 1,000 mcg tablet, Take 1 tablet (1,000 mcg) by mouth once daily., Disp: , Rfl:     escitalopram (Lexapro) 10 mg tablet, Take 1 tablet (10 mg) by mouth once daily., Disp: , Rfl:     losartan (Cozaar) 50 mg tablet, Take 1 tablet (50 mg) by mouth once daily., Disp: , Rfl:     multivitamin tablet, Take 1 tablet by mouth once daily., Disp: , Rfl:      REVIEW OF SYSTEMS:  Except for the symptoms described in the interval history, the review of systems is negative. Specifically, except as noted, when asked the patient expressed no complaints relative to constitutional (fever, weight loss), eyes, ears, nose, mouth, throat, neurologic, cardiovascular, pulmonary, breast, GI, , skin, musculoskeletal, endocrine, hematologic/lymphatic, or immunologic systems.    PERFORMANCE STATUS:  Karnofsky Performance Score/ECO,  Fully active, able to carry on all pre-disease performed without restriction (ECOG equivalent 0)    PHYSICAL EXAMINATION:  /85   Pulse 82   Temp 36.3 °C (97.3 °F) (Temporal)   Resp 18   Wt 119 kg (261 lb 8 oz)   SpO2 97%   BMI 38.08 kg/m²   Pain score: 0/10    General: no acute distress, engaged in conversation. No jaundice.  HEENT: Normocephalic, atraumatic. Extraocular movements are intact.   Neck: supple with trachea at midline, no palpable adenopathy.   Pulmonary: Breathing comfortably on room air, no respiratory distress  Cardiovascular: Regular rate, no cyanosis, well-perfused  Abdomen: Soft, nontender, nondistended.   Extremities: No lower extremity edema or cyanosis.   Musculoskeletal: Normal range of motion. Able to raise both arms above head without issues  Skin: Without rash or obvious lesions.   Neurologic: Alert and oriented x3. Cranial nerves grossly intact.       DIAGNOSTIC REPORTS REVIEWED:  Imaging: All imaging was personally reviewed and interpreted in clinic. Findings as per interval history and EMR.  Laboratory/Pathology:  All pertinent labs and pathology were personally reviewed and interpreted in clinic. Findings as per interval history and EMR.     IMPRESSION:  Mr. Burns has no evidence of active disease on scans at this time and no adverse effects of RT.    PLAN:   I have asked Jason Burns to please return to clinic in 6 month's time with a repeat CT-CAP, labs per [facility]. This will be coordinated with medical oncology. He knows to call with any questions or concerns in the interim.    PAIN PLAN: The patient reports their pain is well-controlled on their current regimen.  Their pain regimen is currently managed by Radiation Oncology.  No pain.    DISEASE STATUS: Controlled  NEW METACHRONOUS CANCER: No    Thank you for the opportunity to participate in the ongoing care of this pleasant man.    Nickolas Shelley MD  PGY-2, Radiation Oncology Resident

## 2025-05-30 ENCOUNTER — TELEPHONE (OUTPATIENT)
Dept: RADIATION ONCOLOGY | Facility: HOSPITAL | Age: 65
End: 2025-05-30
Payer: COMMERCIAL

## 2025-06-02 ENCOUNTER — HOSPITAL ENCOUNTER (OUTPATIENT)
Dept: RADIATION ONCOLOGY | Facility: HOSPITAL | Age: 65
Setting detail: RADIATION/ONCOLOGY SERIES
Discharge: HOME | End: 2025-06-02
Payer: COMMERCIAL

## 2025-06-02 VITALS
RESPIRATION RATE: 18 BRPM | BODY MASS INDEX: 38.08 KG/M2 | SYSTOLIC BLOOD PRESSURE: 141 MMHG | TEMPERATURE: 97.3 F | OXYGEN SATURATION: 97 % | WEIGHT: 261.5 LBS | HEART RATE: 82 BPM | DIASTOLIC BLOOD PRESSURE: 85 MMHG

## 2025-06-02 DIAGNOSIS — C64.1 RENAL CELL CARCINOMA OF RIGHT KIDNEY: Primary | ICD-10-CM

## 2025-06-02 PROCEDURE — 99213 OFFICE O/P EST LOW 20 MIN: CPT | Performed by: RADIOLOGY

## 2025-06-02 ASSESSMENT — ENCOUNTER SYMPTOMS
OCCASIONAL FEELINGS OF UNSTEADINESS: 0
NERVOUS/ANXIOUS: 0
DIAPHORESIS: 0
SORE THROAT: 0
LOSS OF SENSATION IN FEET: 0
RESPIRATORY NEGATIVE: 1
CHILLS: 0
NEUROLOGICAL NEGATIVE: 1
UNEXPECTED WEIGHT CHANGE: 0
VOICE CHANGE: 0
APPETITE CHANGE: 0
SLEEP DISTURBANCE: 0
BACK PAIN: 1
BRUISES/BLEEDS EASILY: 0
EYE PROBLEMS: 1
DEPRESSION: 0
CARDIOVASCULAR NEGATIVE: 1
CONFUSION: 0
FATIGUE: 0
GASTROINTESTINAL NEGATIVE: 1
TROUBLE SWALLOWING: 0
DECREASED CONCENTRATION: 0
FEVER: 0

## 2025-06-02 ASSESSMENT — PAIN SCALES - GENERAL: PAINLEVEL_OUTOF10: 0-NO PAIN

## 2025-06-02 NOTE — PROGRESS NOTES
Radiation Oncology Nursing Note    Pain: The patient's current pain level was assessed.  They report currently having a pain of 0 out of 10.  They feel their pain is under control without the use of pain medications.    Review of Systems:  Review of Systems   Constitutional:  Negative for appetite change, chills, diaphoresis, fatigue, fever and unexpected weight change.   HENT:   Negative for hearing loss, mouth sores, nosebleeds, sore throat, tinnitus, trouble swallowing and voice change.    Eyes:  Positive for eye problems (wears glasses).   Respiratory: Negative.     Cardiovascular: Negative.    Gastrointestinal: Negative.    Genitourinary: Negative.     Musculoskeletal:  Positive for back pain.        Arthritis pain in back.      Skin: Negative.    Neurological: Negative.    Hematological:  Does not bruise/bleed easily.   Psychiatric/Behavioral:  Negative for confusion, decreased concentration, depression, sleep disturbance and suicidal ideas. The patient is not nervous/anxious.

## 2025-06-17 ENCOUNTER — INFUSION (OUTPATIENT)
Dept: HEMATOLOGY/ONCOLOGY | Facility: CLINIC | Age: 65
End: 2025-06-17
Payer: COMMERCIAL

## 2025-06-17 ENCOUNTER — OFFICE VISIT (OUTPATIENT)
Dept: HEMATOLOGY/ONCOLOGY | Facility: CLINIC | Age: 65
End: 2025-06-17
Payer: COMMERCIAL

## 2025-06-17 VITALS
TEMPERATURE: 97.9 F | WEIGHT: 260.8 LBS | DIASTOLIC BLOOD PRESSURE: 85 MMHG | HEART RATE: 74 BPM | SYSTOLIC BLOOD PRESSURE: 125 MMHG | BODY MASS INDEX: 37.98 KG/M2 | RESPIRATION RATE: 16 BRPM | OXYGEN SATURATION: 95 %

## 2025-06-17 DIAGNOSIS — C64.1 RENAL CELL CARCINOMA OF RIGHT KIDNEY: Primary | ICD-10-CM

## 2025-06-17 DIAGNOSIS — C64.1 RENAL CELL CARCINOMA OF RIGHT KIDNEY: ICD-10-CM

## 2025-06-17 LAB
ALBUMIN SERPL BCP-MCNC: 4.1 G/DL (ref 3.4–5)
ALP SERPL-CCNC: 77 U/L (ref 33–136)
ALT SERPL W P-5'-P-CCNC: 17 U/L (ref 10–52)
ANION GAP SERPL CALC-SCNC: 13 MMOL/L (ref 10–20)
AST SERPL W P-5'-P-CCNC: 14 U/L (ref 9–39)
BASOPHILS # BLD AUTO: 0.05 X10*3/UL (ref 0–0.1)
BASOPHILS NFR BLD AUTO: 0.9 %
BILIRUB SERPL-MCNC: 0.5 MG/DL (ref 0–1.2)
BUN SERPL-MCNC: 21 MG/DL (ref 6–23)
CALCIUM SERPL-MCNC: 9.1 MG/DL (ref 8.6–10.3)
CHLORIDE SERPL-SCNC: 100 MMOL/L (ref 98–107)
CO2 SERPL-SCNC: 28 MMOL/L (ref 21–32)
CORTIS AM PEAK SERPL-MSCNC: 17.2 UG/DL (ref 5–20)
CREAT SERPL-MCNC: 1.51 MG/DL (ref 0.5–1.3)
EGFRCR SERPLBLD CKD-EPI 2021: 52 ML/MIN/1.73M*2
EOSINOPHIL # BLD AUTO: 0.18 X10*3/UL (ref 0–0.7)
EOSINOPHIL NFR BLD AUTO: 3.3 %
ERYTHROCYTE [DISTWIDTH] IN BLOOD BY AUTOMATED COUNT: 12.5 % (ref 11.5–14.5)
GLUCOSE SERPL-MCNC: 307 MG/DL (ref 74–99)
HCT VFR BLD AUTO: 49.5 % (ref 41–52)
HGB BLD-MCNC: 16.6 G/DL (ref 13.5–17.5)
IMM GRANULOCYTES # BLD AUTO: 0.02 X10*3/UL (ref 0–0.7)
IMM GRANULOCYTES NFR BLD AUTO: 0.4 % (ref 0–0.9)
LYMPHOCYTES # BLD AUTO: 1.04 X10*3/UL (ref 1.2–4.8)
LYMPHOCYTES NFR BLD AUTO: 19 %
MCH RBC QN AUTO: 29.5 PG (ref 26–34)
MCHC RBC AUTO-ENTMCNC: 33.5 G/DL (ref 32–36)
MCV RBC AUTO: 88 FL (ref 80–100)
MONOCYTES # BLD AUTO: 0.45 X10*3/UL (ref 0.1–1)
MONOCYTES NFR BLD AUTO: 8.2 %
NEUTROPHILS # BLD AUTO: 3.72 X10*3/UL (ref 1.2–7.7)
NEUTROPHILS NFR BLD AUTO: 68.2 %
PLATELET # BLD AUTO: 178 X10*3/UL (ref 150–450)
POTASSIUM SERPL-SCNC: 4.2 MMOL/L (ref 3.5–5.3)
PROT SERPL-MCNC: 7 G/DL (ref 6.4–8.2)
RBC # BLD AUTO: 5.63 X10*6/UL (ref 4.5–5.9)
SODIUM SERPL-SCNC: 137 MMOL/L (ref 136–145)
T4 FREE SERPL-MCNC: 0.91 NG/DL (ref 0.61–1.12)
TSH SERPL-ACNC: 0.19 MIU/L (ref 0.44–3.98)
WBC # BLD AUTO: 5.5 X10*3/UL (ref 4.4–11.3)

## 2025-06-17 PROCEDURE — 2500000004 HC RX 250 GENERAL PHARMACY W/ HCPCS (ALT 636 FOR OP/ED): Performed by: INTERNAL MEDICINE

## 2025-06-17 PROCEDURE — 80053 COMPREHEN METABOLIC PANEL: CPT

## 2025-06-17 PROCEDURE — 96375 TX/PRO/DX INJ NEW DRUG ADDON: CPT | Mod: INF

## 2025-06-17 PROCEDURE — 36415 COLL VENOUS BLD VENIPUNCTURE: CPT | Performed by: INTERNAL MEDICINE

## 2025-06-17 PROCEDURE — 3079F DIAST BP 80-89 MM HG: CPT | Performed by: INTERNAL MEDICINE

## 2025-06-17 PROCEDURE — 2500000001 HC RX 250 WO HCPCS SELF ADMINISTERED DRUGS (ALT 637 FOR MEDICARE OP): Performed by: INTERNAL MEDICINE

## 2025-06-17 PROCEDURE — 4010F ACE/ARB THERAPY RXD/TAKEN: CPT | Performed by: INTERNAL MEDICINE

## 2025-06-17 PROCEDURE — 85025 COMPLETE CBC W/AUTO DIFF WBC: CPT

## 2025-06-17 PROCEDURE — 96413 CHEMO IV INFUSION 1 HR: CPT

## 2025-06-17 PROCEDURE — 99215 OFFICE O/P EST HI 40 MIN: CPT | Mod: 25 | Performed by: INTERNAL MEDICINE

## 2025-06-17 PROCEDURE — 99215 OFFICE O/P EST HI 40 MIN: CPT | Performed by: INTERNAL MEDICINE

## 2025-06-17 PROCEDURE — 84439 ASSAY OF FREE THYROXINE: CPT

## 2025-06-17 PROCEDURE — 3074F SYST BP LT 130 MM HG: CPT | Performed by: INTERNAL MEDICINE

## 2025-06-17 PROCEDURE — 84443 ASSAY THYROID STIM HORMONE: CPT

## 2025-06-17 PROCEDURE — 82024 ASSAY OF ACTH: CPT

## 2025-06-17 PROCEDURE — 82533 TOTAL CORTISOL: CPT | Mod: GEALAB

## 2025-06-17 RX ORDER — DIPHENHYDRAMINE HYDROCHLORIDE 50 MG/ML
50 INJECTION, SOLUTION INTRAMUSCULAR; INTRAVENOUS AS NEEDED
OUTPATIENT
Start: 2025-10-07

## 2025-06-17 RX ORDER — LORAZEPAM 0.5 MG/1
0.5 TABLET ORAL ONCE
Start: 2025-10-07 | End: 2025-10-07

## 2025-06-17 RX ORDER — FAMOTIDINE 10 MG/ML
20 INJECTION, SOLUTION INTRAVENOUS ONCE AS NEEDED
OUTPATIENT
Start: 2025-10-07

## 2025-06-17 RX ORDER — ALBUTEROL SULFATE 0.83 MG/ML
3 SOLUTION RESPIRATORY (INHALATION) AS NEEDED
OUTPATIENT
Start: 2025-09-09

## 2025-06-17 RX ORDER — EPINEPHRINE 0.3 MG/.3ML
0.3 INJECTION SUBCUTANEOUS EVERY 5 MIN PRN
OUTPATIENT
Start: 2025-09-09

## 2025-06-17 RX ORDER — ALBUTEROL SULFATE 0.83 MG/ML
3 SOLUTION RESPIRATORY (INHALATION) AS NEEDED
OUTPATIENT
Start: 2025-11-04

## 2025-06-17 RX ORDER — PROCHLORPERAZINE MALEATE 10 MG
10 TABLET ORAL EVERY 6 HOURS PRN
Status: DISCONTINUED | OUTPATIENT
Start: 2025-06-17 | End: 2025-06-17 | Stop reason: HOSPADM

## 2025-06-17 RX ORDER — LORAZEPAM 0.5 MG/1
0.5 TABLET ORAL ONCE
Status: COMPLETED | OUTPATIENT
Start: 2025-06-17 | End: 2025-06-17

## 2025-06-17 RX ORDER — LORAZEPAM 0.5 MG/1
0.5 TABLET ORAL ONCE
Start: 2025-12-02 | End: 2025-12-02

## 2025-06-17 RX ORDER — PROCHLORPERAZINE EDISYLATE 5 MG/ML
10 INJECTION INTRAMUSCULAR; INTRAVENOUS EVERY 6 HOURS PRN
OUTPATIENT
Start: 2025-09-09

## 2025-06-17 RX ORDER — LORAZEPAM 0.5 MG/1
0.5 TABLET ORAL ONCE
Start: 2025-09-09 | End: 2025-09-09

## 2025-06-17 RX ORDER — PROCHLORPERAZINE MALEATE 10 MG
10 TABLET ORAL EVERY 6 HOURS PRN
OUTPATIENT
Start: 2025-10-07

## 2025-06-17 RX ORDER — PROCHLORPERAZINE EDISYLATE 5 MG/ML
10 INJECTION INTRAMUSCULAR; INTRAVENOUS EVERY 6 HOURS PRN
OUTPATIENT
Start: 2025-10-07

## 2025-06-17 RX ORDER — EPINEPHRINE 0.3 MG/.3ML
0.3 INJECTION SUBCUTANEOUS EVERY 5 MIN PRN
OUTPATIENT
Start: 2025-11-04

## 2025-06-17 RX ORDER — PROCHLORPERAZINE EDISYLATE 5 MG/ML
10 INJECTION INTRAMUSCULAR; INTRAVENOUS EVERY 6 HOURS PRN
OUTPATIENT
Start: 2025-12-02

## 2025-06-17 RX ORDER — ALBUTEROL SULFATE 0.83 MG/ML
3 SOLUTION RESPIRATORY (INHALATION) AS NEEDED
Status: DISCONTINUED | OUTPATIENT
Start: 2025-06-17 | End: 2025-06-17 | Stop reason: HOSPADM

## 2025-06-17 RX ORDER — FAMOTIDINE 10 MG/ML
20 INJECTION, SOLUTION INTRAVENOUS ONCE AS NEEDED
Status: DISCONTINUED | OUTPATIENT
Start: 2025-06-17 | End: 2025-06-17 | Stop reason: HOSPADM

## 2025-06-17 RX ORDER — FAMOTIDINE 10 MG/ML
20 INJECTION, SOLUTION INTRAVENOUS ONCE AS NEEDED
OUTPATIENT
Start: 2025-12-02

## 2025-06-17 RX ORDER — PROCHLORPERAZINE EDISYLATE 5 MG/ML
10 INJECTION INTRAMUSCULAR; INTRAVENOUS EVERY 6 HOURS PRN
Status: DISCONTINUED | OUTPATIENT
Start: 2025-06-17 | End: 2025-06-17 | Stop reason: HOSPADM

## 2025-06-17 RX ORDER — DIPHENHYDRAMINE HYDROCHLORIDE 50 MG/ML
50 INJECTION, SOLUTION INTRAMUSCULAR; INTRAVENOUS AS NEEDED
OUTPATIENT
Start: 2025-12-02

## 2025-06-17 RX ORDER — FAMOTIDINE 10 MG/ML
20 INJECTION, SOLUTION INTRAVENOUS ONCE AS NEEDED
OUTPATIENT
Start: 2025-09-09

## 2025-06-17 RX ORDER — PROCHLORPERAZINE MALEATE 10 MG
10 TABLET ORAL EVERY 6 HOURS PRN
OUTPATIENT
Start: 2025-12-02

## 2025-06-17 RX ORDER — PROCHLORPERAZINE MALEATE 10 MG
10 TABLET ORAL EVERY 6 HOURS PRN
OUTPATIENT
Start: 2025-09-09

## 2025-06-17 RX ORDER — EPINEPHRINE 0.3 MG/.3ML
0.3 INJECTION SUBCUTANEOUS EVERY 5 MIN PRN
OUTPATIENT
Start: 2025-10-07

## 2025-06-17 RX ORDER — PROCHLORPERAZINE EDISYLATE 5 MG/ML
10 INJECTION INTRAMUSCULAR; INTRAVENOUS EVERY 6 HOURS PRN
OUTPATIENT
Start: 2025-11-04

## 2025-06-17 RX ORDER — DIPHENHYDRAMINE HYDROCHLORIDE 50 MG/ML
50 INJECTION, SOLUTION INTRAMUSCULAR; INTRAVENOUS AS NEEDED
OUTPATIENT
Start: 2025-11-04

## 2025-06-17 RX ORDER — EPINEPHRINE 0.3 MG/.3ML
0.3 INJECTION SUBCUTANEOUS EVERY 5 MIN PRN
Status: DISCONTINUED | OUTPATIENT
Start: 2025-06-17 | End: 2025-06-17 | Stop reason: HOSPADM

## 2025-06-17 RX ORDER — ALBUTEROL SULFATE 0.83 MG/ML
3 SOLUTION RESPIRATORY (INHALATION) AS NEEDED
OUTPATIENT
Start: 2025-12-02

## 2025-06-17 RX ORDER — DIPHENHYDRAMINE HYDROCHLORIDE 50 MG/ML
50 INJECTION, SOLUTION INTRAMUSCULAR; INTRAVENOUS AS NEEDED
OUTPATIENT
Start: 2025-09-09

## 2025-06-17 RX ORDER — FAMOTIDINE 10 MG/ML
20 INJECTION, SOLUTION INTRAVENOUS ONCE AS NEEDED
OUTPATIENT
Start: 2025-11-04

## 2025-06-17 RX ORDER — PROCHLORPERAZINE MALEATE 10 MG
10 TABLET ORAL EVERY 6 HOURS PRN
OUTPATIENT
Start: 2025-11-04

## 2025-06-17 RX ORDER — HEPARIN SODIUM,PORCINE/PF 10 UNIT/ML
50 SYRINGE (ML) INTRAVENOUS AS NEEDED
OUTPATIENT
Start: 2025-06-17

## 2025-06-17 RX ORDER — HEPARIN 100 UNIT/ML
500 SYRINGE INTRAVENOUS AS NEEDED
OUTPATIENT
Start: 2025-06-17

## 2025-06-17 RX ORDER — DIPHENHYDRAMINE HYDROCHLORIDE 50 MG/ML
50 INJECTION, SOLUTION INTRAMUSCULAR; INTRAVENOUS AS NEEDED
Status: DISCONTINUED | OUTPATIENT
Start: 2025-06-17 | End: 2025-06-17 | Stop reason: HOSPADM

## 2025-06-17 RX ORDER — LORAZEPAM 0.5 MG/1
0.5 TABLET ORAL ONCE
Start: 2025-11-04 | End: 2025-11-04

## 2025-06-17 RX ORDER — EPINEPHRINE 0.3 MG/.3ML
0.3 INJECTION SUBCUTANEOUS EVERY 5 MIN PRN
OUTPATIENT
Start: 2025-12-02

## 2025-06-17 RX ORDER — ALBUTEROL SULFATE 0.83 MG/ML
3 SOLUTION RESPIRATORY (INHALATION) AS NEEDED
OUTPATIENT
Start: 2025-10-07

## 2025-06-17 RX ADMIN — SODIUM CHLORIDE 480 MG: 9 INJECTION, SOLUTION INTRAVENOUS at 11:20

## 2025-06-17 RX ADMIN — DEXAMETHASONE SODIUM PHOSPHATE 8 MG: 4 INJECTION INTRA-ARTICULAR; INTRALESIONAL; INTRAMUSCULAR; INTRAVENOUS; SOFT TISSUE at 10:58

## 2025-06-17 RX ADMIN — LORAZEPAM 0.5 MG: 0.5 TABLET ORAL at 10:58

## 2025-06-17 ASSESSMENT — ENCOUNTER SYMPTOMS
RESPIRATORY NEGATIVE: 1
CARDIOVASCULAR NEGATIVE: 1
CONSTITUTIONAL NEGATIVE: 1
GASTROINTESTINAL NEGATIVE: 1

## 2025-06-17 ASSESSMENT — PAIN SCALES - GENERAL: PAINLEVEL_OUTOF10: 0-NO PAIN

## 2025-06-17 NOTE — PATIENT INSTRUCTIONS
Today you met with your hematologist/oncologist.  Recent labs were discussed and questions answered.  Scheduling orders were placed.  While we appreciate that you verbalized understanding, if any questions arise after leaving, please do not hesitate to call the office to discuss.  [phone] Tacos Retana will see you in 6 months. You will continue your monthly Nivolumab treatment at this time. Dr Retana has ordered special testing called Signatera that will be drawn every 6 months. This monitors your blood for circulating cancer cells and detects cancer reoccurrence. This will be drawn in our office. Please see your schedule below.

## 2025-06-17 NOTE — PROGRESS NOTES
Patient tolerated Nivolumab infusion well. Reviewed patients schedule and lab results with patient. Patient verbalized understanding.

## 2025-06-17 NOTE — SIGNIFICANT EVENT

## 2025-06-17 NOTE — PROGRESS NOTES
Patient ID: Jason Burns is a 64 y.o. male.  Referring Physician: Kelvin Retana MD  [id number]  [place] [id number]  Primary Care Provider: Karen Colin DO  Visit Type:  {Visit Type:39416}     {Telehealth Consent:17377}    Subjective    HPI    Review of Systems - Oncology     Objective   BSA: There is no height or weight on file to calculate BSA.  There were no vitals taken for this visit.     has a past medical history of Campylobacter enteritis (11/02/2018), Personal history of other diseases of the digestive system (08/11/2016), Plantar fascial fibromatosis (08/19/2015), Renal cell cancer (Multi), and Unspecified sprain of unspecified great toe, initial encounter (08/19/2015).   has a past surgical history that includes Colonoscopy (10/21/2013); Umbilical hernia repair (04/25/2018); US guided needle liver biopsy (10/12/2020); CT angio abdomen pelvis w and or wo IV IV contrast (2/7/2021); and CT angio abdomen pelvis w and or wo IV IV contrast (2/10/2021).  Family History[1]  Oncology History   Renal cell carcinoma of right kidney   10/22/2023 Initial Diagnosis    Renal cell carcinoma of right kidney (CMS/HCC)     10/24/2023 -  Chemotherapy    Nivolumab 480 mg, 28 Day Cycles         Jason Burns  reports that he has never smoked. He has been exposed to tobacco smoke. He has never used smokeless tobacco.  He  reports current alcohol use.  He  reports that he does not currently use drugs.    Physical Exam    WBC   Date/Time Value Ref Range Status   05/20/2025 09:35 AM 5.5 4.4 - 11.3 x10*3/uL Final   04/22/2025 09:55 AM 4.6 4.4 - 11.3 x10*3/uL Final   03/25/2025 09:24 AM 4.7 4.4 - 11.3 x10*3/uL Final     nRBC   Date Value Ref Range Status   11/21/2023 0.0 0.0 - 0.0 /100 WBCs Final   08/29/2023 CANCELED 0.0 - 0.0 /100 WBC      Comment:     Result canceled by the ancillary.   08/23/2023 CANCELED       Comment:     Result canceled by the ancillary.   05/19/2023  CANCELED       Comment:     Result canceled by the ancillary.     RBC   Date Value Ref Range Status   05/20/2025 5.16 4.50 - 5.90 x10*6/uL Final   04/22/2025 5.15 4.50 - 5.90 x10*6/uL Final   03/25/2025 5.21 4.50 - 5.90 x10*6/uL Final     Hemoglobin   Date Value Ref Range Status   05/20/2025 15.3 13.5 - 17.5 g/dL Final   04/22/2025 15.2 13.5 - 17.5 g/dL Final   03/25/2025 15.4 13.5 - 17.5 g/dL Final     Hematocrit   Date Value Ref Range Status   05/20/2025 45.4 41.0 - 52.0 % Final   04/22/2025 45.2 41.0 - 52.0 % Final   03/25/2025 46.1 41.0 - 52.0 % Final     MCV   Date/Time Value Ref Range Status   05/20/2025 09:35 AM 88 80 - 100 fL Final   04/22/2025 09:55 AM 88 80 - 100 fL Final   03/25/2025 09:24 AM 89 80 - 100 fL Final     MCH   Date/Time Value Ref Range Status   05/20/2025 09:35 AM 29.7 26.0 - 34.0 pg Final   04/22/2025 09:55 AM 29.5 26.0 - 34.0 pg Final   03/25/2025 09:24 AM 29.6 26.0 - 34.0 pg Final     MCHC   Date/Time Value Ref Range Status   05/20/2025 09:35 AM 33.7 32.0 - 36.0 g/dL Final   04/22/2025 09:55 AM 33.6 32.0 - 36.0 g/dL Final   03/25/2025 09:24 AM 33.4 32.0 - 36.0 g/dL Final     RDW   Date/Time Value Ref Range Status   05/20/2025 09:35 AM 12.4 11.5 - 14.5 % Final   04/22/2025 09:55 AM 12.5 11.5 - 14.5 % Final   03/25/2025 09:24 AM 13.0 11.5 - 14.5 % Final     Platelets   Date/Time Value Ref Range Status   05/20/2025 09:35  150 - 450 x10*3/uL Final   04/22/2025 09:55  150 - 450 x10*3/uL Final   03/25/2025 09:24  150 - 450 x10*3/uL Final     MPV   Date/Time Value Ref Range Status   10/24/2023 02:34 PM 9.6 7.5 - 11.5 fL Final     Neutrophils %   Date/Time Value Ref Range Status   05/20/2025 09:35 AM 65.9 40.0 - 80.0 % Final   04/22/2025 09:55 AM 64.8 40.0 - 80.0 % Final   03/25/2025 09:24 AM 66.4 40.0 - 80.0 % Final     Immature Granulocytes %, Automated   Date/Time Value Ref Range Status   05/20/2025 09:35 AM 0.2 0.0 - 0.9 % Final     Comment:     Immature Granulocyte Count  (IG) includes promyelocytes, myelocytes and metamyelocytes but does not include bands. Percent differential counts (%) should be interpreted in the context of the absolute cell counts (cells/UL).   04/22/2025 09:55 AM 0.4 0.0 - 0.9 % Final     Comment:     Immature Granulocyte Count (IG) includes promyelocytes, myelocytes and metamyelocytes but does not include bands. Percent differential counts (%) should be interpreted in the context of the absolute cell counts (cells/UL).   03/25/2025 09:24 AM 0.2 0.0 - 0.9 % Final     Comment:     Immature Granulocyte Count (IG) includes promyelocytes, myelocytes and metamyelocytes but does not include bands. Percent differential counts (%) should be interpreted in the context of the absolute cell counts (cells/UL).     Lymphocytes %   Date/Time Value Ref Range Status   05/20/2025 09:35 AM 20.3 13.0 - 44.0 % Final   04/22/2025 09:55 AM 22.5 13.0 - 44.0 % Final   03/25/2025 09:24 AM 19.7 13.0 - 44.0 % Final     Monocytes %   Date/Time Value Ref Range Status   05/20/2025 09:35 AM 7.9 2.0 - 10.0 % Final   04/22/2025 09:55 AM 6.7 2.0 - 10.0 % Final   03/25/2025 09:24 AM 7.7 2.0 - 10.0 % Final     Eosinophils %   Date/Time Value Ref Range Status   05/20/2025 09:35 AM 4.6 0.0 - 6.0 % Final   04/22/2025 09:55 AM 4.3 0.0 - 6.0 % Final   03/25/2025 09:24 AM 5.1 0.0 - 6.0 % Final     Basophils %   Date/Time Value Ref Range Status   05/20/2025 09:35 AM 1.1 0.0 - 2.0 % Final   04/22/2025 09:55 AM 1.3 0.0 - 2.0 % Final   03/25/2025 09:24 AM 0.9 0.0 - 2.0 % Final     Neutrophils Absolute   Date/Time Value Ref Range Status   05/20/2025 09:35 AM 3.61 1.20 - 7.70 x10*3/uL Final     Comment:     Percent differential counts (%) should be interpreted in the context of the absolute cell counts (cells/uL).   04/22/2025 09:55 AM 2.99 1.20 - 7.70 x10*3/uL Final     Comment:     Percent differential counts (%) should be interpreted in the context of the absolute cell counts (cells/uL).   03/25/2025  "09:24 AM 3.10 1.20 - 7.70 x10*3/uL Final     Comment:     Percent differential counts (%) should be interpreted in the context of the absolute cell counts (cells/uL).     Immature Granulocytes Absolute, Automated   Date/Time Value Ref Range Status   05/20/2025 09:35 AM 0.01 0.00 - 0.70 x10*3/uL Final   04/22/2025 09:55 AM 0.02 0.00 - 0.70 x10*3/uL Final   03/25/2025 09:24 AM 0.01 0.00 - 0.70 x10*3/uL Final     Lymphocytes Absolute   Date/Time Value Ref Range Status   05/20/2025 09:35 AM 1.11 (L) 1.20 - 4.80 x10*3/uL Final   04/22/2025 09:55 AM 1.04 (L) 1.20 - 4.80 x10*3/uL Final   03/25/2025 09:24 AM 0.92 (L) 1.20 - 4.80 x10*3/uL Final     Monocytes Absolute   Date/Time Value Ref Range Status   05/20/2025 09:35 AM 0.43 0.10 - 1.00 x10*3/uL Final   04/22/2025 09:55 AM 0.31 0.10 - 1.00 x10*3/uL Final   03/25/2025 09:24 AM 0.36 0.10 - 1.00 x10*3/uL Final     Eosinophils Absolute   Date/Time Value Ref Range Status   05/20/2025 09:35 AM 0.25 0.00 - 0.70 x10*3/uL Final   04/22/2025 09:55 AM 0.20 0.00 - 0.70 x10*3/uL Final   03/25/2025 09:24 AM 0.24 0.00 - 0.70 x10*3/uL Final     Basophils Absolute   Date/Time Value Ref Range Status   05/20/2025 09:35 AM 0.06 0.00 - 0.10 x10*3/uL Final   04/22/2025 09:55 AM 0.06 0.00 - 0.10 x10*3/uL Final   03/25/2025 09:24 AM 0.04 0.00 - 0.10 x10*3/uL Final       No components found for: \"PT\"  aPTT   Date/Time Value Ref Range Status   01/03/2022 06:26 PM 30 26 - 39 sec Final     Comment:     Note new reference range as of 11/30/2021 at 10:00am.   12/29/2021 02:13 AM 26 26 - 39 sec Final     Comment:     Note new reference range as of 11/30/2021 at 10:00am.   12/16/2021 10:11 AM 32 26 - 39 sec Final     Comment:     Note new reference range as of 11/30/2021 at 10:00am.       Assessment/Plan           {Assess/PlanSmartLinks:19360}         INF 07 LORI                                [1]  Family History  Problem Relation Name Age of Onset   • Pancreatic cancer Brother Jed    "

## 2025-06-17 NOTE — PROGRESS NOTES
Education Documentation  Returning to Work/School/Activities, taught by Radha Pang RN at 6/17/2025 11:27 AM.  Learner: Patient  Readiness: Acceptance  Method: Explanation  Response: Verbalizes Understanding    Stress Management, taught by Radha Pang RN at 6/17/2025 11:27 AM.  Learner: Patient  Readiness: Acceptance  Method: Explanation  Response: Verbalizes Understanding    Changing Role with Self and Family, taught by Radha Pang RN at 6/17/2025 11:27 AM.  Learner: Patient  Readiness: Acceptance  Method: Explanation  Response: Verbalizes Understanding    Leisure Education, taught by Radha Pang RN at 6/17/2025 11:27 AM.  Learner: Patient  Readiness: Acceptance  Method: Explanation  Response: Verbalizes Understanding    Health Systems & Community Resources, taught by Radha Pang RN at 6/17/2025 11:27 AM.  Learner: Patient  Readiness: Acceptance  Method: Explanation  Response: Verbalizes Understanding    Advanced Medical Directives, taught by Radha Pang RN at 6/17/2025 11:27 AM.  Learner: Patient  Readiness: Acceptance  Method: Explanation  Response: Verbalizes Understanding    Escort, Parking, Transportation Home, taught by Radha Pang RN at 6/17/2025 11:27 AM.  Learner: Patient  Readiness: Acceptance  Method: Explanation  Response: Verbalizes Understanding    Referrals to Support Services, taught by Radha Pang RN at 6/17/2025 11:27 AM.  Learner: Patient  Readiness: Acceptance  Method: Explanation  Response: Verbalizes Understanding    Support Systems, taught by Radha Pang RN at 6/17/2025 11:27 AM.  Learner: Patient  Readiness: Acceptance  Method: Explanation  Response: Verbalizes Understanding    Financial Assistance Information, taught by Radha Pang RN at 6/17/2025 11:27 AM.  Learner: Patient  Readiness: Acceptance  Method: Explanation  Response: Verbalizes Understanding    Financial Benefits Summary, taught by Radha Pang RN at 6/17/2025 11:27 AM.  Learner: Patient  Readiness: Acceptance  Method:  Explanation  Response: Verbalizes Understanding    Healthy Lifestyle, taught by Radha Pang RN at 6/17/2025 11:27 AM.  Learner: Patient  Readiness: Acceptance  Method: Explanation  Response: Verbalizes Understanding    Monitoring Weight, taught by Radha Pang RN at 6/17/2025 11:27 AM.  Learner: Patient  Readiness: Acceptance  Method: Explanation  Response: Verbalizes Understanding    Tips for Daily Living, taught by Radha Pang RN at 6/17/2025 11:27 AM.  Learner: Patient  Readiness: Acceptance  Method: Explanation  Response: Verbalizes Understanding    Nutrition/Diet, taught by Radha Pang RN at 6/17/2025 11:27 AM.  Learner: Patient  Readiness: Acceptance  Method: Explanation  Response: Verbalizes Understanding    Food-Drug Interactions, taught by Radha Pang RN at 6/17/2025 11:27 AM.  Learner: Patient  Readiness: Acceptance  Method: Explanation  Response: Verbalizes Understanding    Nutrition Related Education, taught by Radha Pang RN at 6/17/2025 11:27 AM.  Learner: Patient  Readiness: Acceptance  Method: Explanation  Response: Verbalizes Understanding    Pain Medication Actions & Side Effects, taught by Radha Pang RN at 6/17/2025 11:27 AM.  Learner: Patient  Readiness: Acceptance  Method: Explanation  Response: Verbalizes Understanding    Patient Controlled Analgesia, taught by Radha Pang RN at 6/17/2025 11:27 AM.  Learner: Patient  Readiness: Acceptance  Method: Explanation  Response: Verbalizes Understanding    Discuss the Use of Pain Control Measures Before Pain Becomes Severe, taught by Radha Pang RN at 6/17/2025 11:27 AM.  Learner: Patient  Readiness: Acceptance  Method: Explanation  Response: Verbalizes Understanding    Pain Management, taught by Radha Pang RN at 6/17/2025 11:27 AM.  Learner: Patient  Readiness: Acceptance  Method: Explanation  Response: Verbalizes Understanding    Pain Rating Scale, taught by aRdha Pang RN at 6/17/2025 11:27 AM.  Learner: Patient  Readiness:  Acceptance  Method: Explanation  Response: Verbalizes Understanding    Shortness of Breath, taught by Radha Pang RN at 6/17/2025 11:27 AM.  Learner: Patient  Readiness: Acceptance  Method: Explanation  Response: Verbalizes Understanding    Changes in Appetite, taught by Radha Pang RN at 6/17/2025 11:27 AM.  Learner: Patient  Readiness: Acceptance  Method: Explanation  Response: Verbalizes Understanding    Memory Problems, taught by Radha Pang RN at 6/17/2025 11:27 AM.  Learner: Patient  Readiness: Acceptance  Method: Explanation  Response: Verbalizes Understanding    Skin and Nail Changes, taught by Radha Pang RN at 6/17/2025 11:27 AM.  Learner: Patient  Readiness: Acceptance  Method: Explanation  Response: Verbalizes Understanding    Changes in Urination, taught by Radha Pang RN at 6/17/2025 11:27 AM.  Learner: Patient  Readiness: Acceptance  Method: Explanation  Response: Verbalizes Understanding    Bleeding Precautions, taught by Radha Pang RN at 6/17/2025 11:27 AM.  Learner: Patient  Readiness: Acceptance  Method: Explanation  Response: Verbalizes Understanding    Edema Management, taught by Radha Pang RN at 6/17/2025 11:27 AM.  Learner: Patient  Readiness: Acceptance  Method: Explanation  Response: Verbalizes Understanding    Care of Neuropathy, taught by Radha Pang RN at 6/17/2025 11:27 AM.  Learner: Patient  Readiness: Acceptance  Method: Explanation  Response: Verbalizes Understanding    Infection Control, taught by Radha aPng RN at 6/17/2025 11:27 AM.  Learner: Patient  Readiness: Acceptance  Method: Explanation  Response: Verbalizes Understanding    Alopecia, taught by Radha Pang RN at 6/17/2025 11:27 AM.  Learner: Patient  Readiness: Acceptance  Method: Explanation  Response: Verbalizes Understanding    Diarrhea, taught by Radha Pang RN at 6/17/2025 11:27 AM.  Learner: Patient  Readiness: Acceptance  Method: Explanation  Response: Verbalizes Understanding    Constipation, taught by  Radha Pang RN at 6/17/2025 11:27 AM.  Learner: Patient  Readiness: Acceptance  Method: Explanation  Response: Verbalizes Understanding    Mouth Sores, taught by Radha Pang RN at 6/17/2025 11:27 AM.  Learner: Patient  Readiness: Acceptance  Method: Explanation  Response: Verbalizes Understanding    Nausea Management, taught by Radha Pang RN at 6/17/2025 11:27 AM.  Learner: Patient  Readiness: Acceptance  Method: Explanation  Response: Verbalizes Understanding    Fatigue, taught by Radha Pang RN at 6/17/2025 11:27 AM.  Learner: Patient  Readiness: Acceptance  Method: Explanation  Response: Verbalizes Understanding    Nivolumab, taught by Radha Pang RN at 6/17/2025 11:27 AM.  Learner: Patient  Readiness: Acceptance  Method: Explanation  Response: Verbalizes Understanding    Post-Chemotherapy Education, taught by Radha Pang RN at 6/17/2025 11:27 AM.  Learner: Patient  Readiness: Acceptance  Method: Explanation  Response: Verbalizes Understanding    Chemotherapy Safety at Home, taught by Radha Pang RN at 6/17/2025 11:27 AM.  Learner: Patient  Readiness: Acceptance  Method: Explanation  Response: Verbalizes Understanding    Treatment Plan and Schedule, taught by Radha Pang RN at 6/17/2025 11:27 AM.  Learner: Patient  Readiness: Acceptance  Method: Explanation  Response: Verbalizes Understanding    General Medication Information, taught by Radha Pang RN at 6/17/2025 11:27 AM.  Learner: Patient  Readiness: Acceptance  Method: Explanation  Response: Verbalizes Understanding    Supportive Medications, taught by Radha Pang RN at 6/17/2025 11:27 AM.  Learner: Patient  Readiness: Acceptance  Method: Explanation  Response: Verbalizes Understanding    Comprehensive Metabolic Panel (CMP), taught by Radha Pang RN at 6/17/2025 11:27 AM.  Learner: Patient  Readiness: Acceptance  Method: Explanation  Response: Verbalizes Understanding    Complete Blood Count with Differential (CBC w/ Diff), taught by Radha Pang  RN at 6/17/2025 11:27 AM.  Learner: Patient  Readiness: Acceptance  Method: Explanation  Response: Verbalizes Understanding    Education Comments  No comments found.

## 2025-06-17 NOTE — PROGRESS NOTES
Patient ID: Jason Burns is a 64 y.o. male.  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  #2) DM, HTN        History of Present Illness:   Completed SBRT to 50Gy in 5fx to the soft tissue recurrence (5.6cm in max diameter at that time), with COT on 05/19/2023. Patient presents for follow up and treatment. On monthly Opdivo, tolerating well.   Review of Systems   Constitutional: Negative.    HENT:  Negative.     Respiratory: Negative.     Cardiovascular: Negative.    Gastrointestinal: Negative.         Objective   BSA: 2.41 meters squared  /85 (BP Location: Right arm)   Pulse 74   Temp 36.6 °C (97.9 °F)   Resp 16   Wt 118 kg (260 lb 12.9 oz)   SpO2 95%   BMI 37.98 kg/m²      has a past medical history of Campylobacter enteritis (11/02/2018), Personal history of other diseases of the digestive system (08/11/2016), Plantar fascial fibromatosis (08/19/2015), Renal cell cancer (Multi), and Unspecified sprain of unspecified great toe, initial encounter (08/19/2015).   has a past surgical history that includes Colonoscopy (10/21/2013); Umbilical hernia repair (04/25/2018); US guided needle liver biopsy (10/12/2020); CT angio abdomen pelvis w and or wo IV IV contrast (2/7/2021); and CT angio abdomen pelvis w and or wo IV IV contrast (2/10/2021).  Family History[1]  Oncology History   Renal cell carcinoma of right kidney   10/22/2023 Initial Diagnosis    Renal cell carcinoma of right kidney (CMS/HCC)     10/24/2023 -  Chemotherapy    Nivolumab 480 mg, 28 Day Cycles         Jason Burns  reports that he has never smoked. He has been exposed to tobacco smoke. He has never used smokeless tobacco.  He  reports current  alcohol use.  He  reports that he does not currently use drugs.    Physical Exam  Constitutional:       Appearance: Normal appearance.   HENT:      Head: Normocephalic and atraumatic.   Eyes:      Extraocular Movements: Extraocular movements intact.      Pupils: Pupils are equal, round, and reactive to light.   Neurological:      Mental Status: He is alert.         WBC   Date/Time Value Ref Range Status   05/20/2025 09:35 AM 5.5 4.4 - 11.3 x10*3/uL Final   04/22/2025 09:55 AM 4.6 4.4 - 11.3 x10*3/uL Final   03/25/2025 09:24 AM 4.7 4.4 - 11.3 x10*3/uL Final     nRBC   Date Value Ref Range Status   11/21/2023 0.0 0.0 - 0.0 /100 WBCs Final   08/29/2023 CANCELED 0.0 - 0.0 /100 WBC      Comment:     Result canceled by the ancillary.   08/23/2023 CANCELED       Comment:     Result canceled by the ancillary.   05/19/2023 CANCELED       Comment:     Result canceled by the ancillary.     RBC   Date Value Ref Range Status   05/20/2025 5.16 4.50 - 5.90 x10*6/uL Final   04/22/2025 5.15 4.50 - 5.90 x10*6/uL Final   03/25/2025 5.21 4.50 - 5.90 x10*6/uL Final     Hemoglobin   Date Value Ref Range Status   05/20/2025 15.3 13.5 - 17.5 g/dL Final   04/22/2025 15.2 13.5 - 17.5 g/dL Final   03/25/2025 15.4 13.5 - 17.5 g/dL Final     Hematocrit   Date Value Ref Range Status   05/20/2025 45.4 41.0 - 52.0 % Final   04/22/2025 45.2 41.0 - 52.0 % Final   03/25/2025 46.1 41.0 - 52.0 % Final     MCV   Date/Time Value Ref Range Status   05/20/2025 09:35 AM 88 80 - 100 fL Final   04/22/2025 09:55 AM 88 80 - 100 fL Final   03/25/2025 09:24 AM 89 80 - 100 fL Final     MCH   Date/Time Value Ref Range Status   05/20/2025 09:35 AM 29.7 26.0 - 34.0 pg Final   04/22/2025 09:55 AM 29.5 26.0 - 34.0 pg Final   03/25/2025 09:24 AM 29.6 26.0 - 34.0 pg Final     MCHC   Date/Time Value Ref Range Status   05/20/2025 09:35 AM 33.7 32.0 - 36.0 g/dL Final   04/22/2025 09:55 AM 33.6 32.0 - 36.0 g/dL Final   03/25/2025 09:24 AM 33.4 32.0 - 36.0 g/dL Final     RDW    Date/Time Value Ref Range Status   05/20/2025 09:35 AM 12.4 11.5 - 14.5 % Final   04/22/2025 09:55 AM 12.5 11.5 - 14.5 % Final   03/25/2025 09:24 AM 13.0 11.5 - 14.5 % Final     Platelets   Date/Time Value Ref Range Status   05/20/2025 09:35  150 - 450 x10*3/uL Final   04/22/2025 09:55  150 - 450 x10*3/uL Final   03/25/2025 09:24  150 - 450 x10*3/uL Final     MPV   Date/Time Value Ref Range Status   10/24/2023 02:34 PM 9.6 7.5 - 11.5 fL Final     Neutrophils %   Date/Time Value Ref Range Status   05/20/2025 09:35 AM 65.9 40.0 - 80.0 % Final   04/22/2025 09:55 AM 64.8 40.0 - 80.0 % Final   03/25/2025 09:24 AM 66.4 40.0 - 80.0 % Final     Immature Granulocytes %, Automated   Date/Time Value Ref Range Status   05/20/2025 09:35 AM 0.2 0.0 - 0.9 % Final     Comment:     Immature Granulocyte Count (IG) includes promyelocytes, myelocytes and metamyelocytes but does not include bands. Percent differential counts (%) should be interpreted in the context of the absolute cell counts (cells/UL).   04/22/2025 09:55 AM 0.4 0.0 - 0.9 % Final     Comment:     Immature Granulocyte Count (IG) includes promyelocytes, myelocytes and metamyelocytes but does not include bands. Percent differential counts (%) should be interpreted in the context of the absolute cell counts (cells/UL).   03/25/2025 09:24 AM 0.2 0.0 - 0.9 % Final     Comment:     Immature Granulocyte Count (IG) includes promyelocytes, myelocytes and metamyelocytes but does not include bands. Percent differential counts (%) should be interpreted in the context of the absolute cell counts (cells/UL).     Lymphocytes %   Date/Time Value Ref Range Status   05/20/2025 09:35 AM 20.3 13.0 - 44.0 % Final   04/22/2025 09:55 AM 22.5 13.0 - 44.0 % Final   03/25/2025 09:24 AM 19.7 13.0 - 44.0 % Final     Monocytes %   Date/Time Value Ref Range Status   05/20/2025 09:35 AM 7.9 2.0 - 10.0 % Final   04/22/2025 09:55 AM 6.7 2.0 - 10.0 % Final   03/25/2025 09:24 AM 7.7  2.0 - 10.0 % Final     Eosinophils %   Date/Time Value Ref Range Status   05/20/2025 09:35 AM 4.6 0.0 - 6.0 % Final   04/22/2025 09:55 AM 4.3 0.0 - 6.0 % Final   03/25/2025 09:24 AM 5.1 0.0 - 6.0 % Final     Basophils %   Date/Time Value Ref Range Status   05/20/2025 09:35 AM 1.1 0.0 - 2.0 % Final   04/22/2025 09:55 AM 1.3 0.0 - 2.0 % Final   03/25/2025 09:24 AM 0.9 0.0 - 2.0 % Final     Neutrophils Absolute   Date/Time Value Ref Range Status   05/20/2025 09:35 AM 3.61 1.20 - 7.70 x10*3/uL Final     Comment:     Percent differential counts (%) should be interpreted in the context of the absolute cell counts (cells/uL).   04/22/2025 09:55 AM 2.99 1.20 - 7.70 x10*3/uL Final     Comment:     Percent differential counts (%) should be interpreted in the context of the absolute cell counts (cells/uL).   03/25/2025 09:24 AM 3.10 1.20 - 7.70 x10*3/uL Final     Comment:     Percent differential counts (%) should be interpreted in the context of the absolute cell counts (cells/uL).     Immature Granulocytes Absolute, Automated   Date/Time Value Ref Range Status   05/20/2025 09:35 AM 0.01 0.00 - 0.70 x10*3/uL Final   04/22/2025 09:55 AM 0.02 0.00 - 0.70 x10*3/uL Final   03/25/2025 09:24 AM 0.01 0.00 - 0.70 x10*3/uL Final     Lymphocytes Absolute   Date/Time Value Ref Range Status   05/20/2025 09:35 AM 1.11 (L) 1.20 - 4.80 x10*3/uL Final   04/22/2025 09:55 AM 1.04 (L) 1.20 - 4.80 x10*3/uL Final   03/25/2025 09:24 AM 0.92 (L) 1.20 - 4.80 x10*3/uL Final     Monocytes Absolute   Date/Time Value Ref Range Status   05/20/2025 09:35 AM 0.43 0.10 - 1.00 x10*3/uL Final   04/22/2025 09:55 AM 0.31 0.10 - 1.00 x10*3/uL Final   03/25/2025 09:24 AM 0.36 0.10 - 1.00 x10*3/uL Final     Eosinophils Absolute   Date/Time Value Ref Range Status   05/20/2025 09:35 AM 0.25 0.00 - 0.70 x10*3/uL Final   04/22/2025 09:55 AM 0.20 0.00 - 0.70 x10*3/uL Final   03/25/2025 09:24 AM 0.24 0.00 - 0.70 x10*3/uL Final     Basophils Absolute   Date/Time Value  "Ref Range Status   05/20/2025 09:35 AM 0.06 0.00 - 0.10 x10*3/uL Final   04/22/2025 09:55 AM 0.06 0.00 - 0.10 x10*3/uL Final   03/25/2025 09:24 AM 0.04 0.00 - 0.10 x10*3/uL Final       No components found for: \"PT\"  aPTT   Date/Time Value Ref Range Status   01/03/2022 06:26 PM 30 26 - 39 sec Final     Comment:     Note new reference range as of 11/30/2021 at 10:00am.   12/29/2021 02:13 AM 26 26 - 39 sec Final     Comment:     Note new reference range as of 11/30/2021 at 10:00am.   12/16/2021 10:11 AM 32 26 - 39 sec Final     Comment:     Note new reference range as of 11/30/2021 at 10:00am.       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 Excellent response to 4 cycles of Ipi/Nivo. Course was complicated pneumonitis that has resolved. As well as occult GI bleed, unknown etiology, resolved.     Now on maintenance Opdivo doing well. CT in 10/2021 showed interval increase in size of a retroperitoneal soft tissue mass at the right nephrectomy bed currently measuring 24 x 29 mm. MRI abd 12/2021 showed enlarging R hepatic lobe and R pericolonic metastases.     S/P surgical resection of residual disease in liver and in nephrectomy bed in 12/2021. Path c/w residual disease retroperitoneal lymph nodes and liver.     Continue Opdivo Monthly.      3/2023:  CT scan noting oligometastatic disease in the right lower quadrant site of nephrectomy. Seen by rad onc and awaiting treatment.     5/9/2023:  Plan patient to continue Opdivo q. 28 days. Noted to have mild hypercalcemia. Plan for hydration and lab check next week. Will also obtain bone scan.     06/06/2023 patient showing oligometastatic disease.  Right lower quadrant site of nephrectomy.  Patient is status post radiation to the surgical bed with very good effect.  He is to continue Opdivo q. 28 days scheduled today.  Cleared for therapy.     09/26/2023 patient presents today the results of his radiographic imaging indicating a response to " treatment reduction in size no new lesions.  This has been explained to patient.  He will continue Opdivo q. 28 daily.  He has no complaints.     12/19/2023: Continue Opdivo every 4 weeks.    6/4/2024: CT CAP:IMPRESSION:  Sarcomatoid carcinoma of kidney restaging scan.  1. No evidence of metastatic disease identified in the chest. 2. Compared to prior CT dated 02/09/2024, there has been no significant interval change in the size of the soft tissue mesenteric implant in the right hemiabdomen with associated postradiation  changes.  3. Redemonstration of postsurgical changes of right posterior partial hepatectomy and right total nephrectomy, without interval increase in the size of small implants in the surgical bed.  4. No evidence of increased abdominopelvic lymphadenopathy.  5. Stable aneurysmal dilatation of the ascending aorta up to 4.6 cm.  6. Additional findings as above.       Signed by: Adrián Bowling 5/9/2024 8/27/2024:  CT CAP: IMPRESSION:   CHEST:   1. No intrathoracic metastatic disease.   2. Stable appearance of the aneurysmal dilatation of the ascending   thoracic aorta measuring 4.6 cm.   3. Stable appearance of the thyroid gland with multiple partially   calcific nodules.       ABDOMEN-PELVIS:   1. Status post radical right nephrectomy/adrenalectomy and partial   right posterior hepatectomy with a stable few subcentimeter tiny   nodules at the surgical bed posteriorly lateral to the right psoas   muscle measuring up to 0.7 cm.   2. Postradiation changes in the right hemiabdomen with a stable few   mesenteric nodules measuring up to 1.3 cm.   3. No new concerning findings in the abdomen or pelvis.        MACRO:   None      Signed by: Sai Paredes 8/13/2024   CT scan as stated above does not show any evidence of disease recurrence.  Will continue Opdivo patient is tolerating it well with no complications or adverse effects.  Return to clinic in 4 months.     11/19/2024: CT scan 11/18/2024: as stated  above does not show any evidence of disease recurrence.  Will continue Opdivo patient is tolerating it well with no complications or adverse effects.  Return to clinic in 4 months.     2/25/2025 : Will continue Opdivo patient is tolerating it well with no complications or adverse effects.  Return to clinic in 6 months.    6/17/2025: CT CAP : IMPRESSION:  CHEST: 1.  No new metastatic disease. ABDOMEN-PELVIS: 1.  No new metastatic disease. 2. Previous right mesenteric and retroperitoneal nodules are not significantly changed. Signed by: Delio Dubose 5/21/2025    Proceed with Immunotherapy: RTC 6 months.     Diagnoses and all orders for this visit:  Renal cell carcinoma of right kidney  -     Clinic Appointment Request  -     [facility]; [facility] - Miscellaneous Genetics Test; Standing  -     Clinic Appointment Request Follow Up (review natear trend); Future  -     Infusion Appointment Request; Future           Florentino-Srinivasan Retana MD                              [1]   Family History  Problem Relation Name Age of Onset    Pancreatic cancer Brother Jed

## 2025-06-19 LAB — ACTH PLAS-MCNC: 79.5 PG/ML (ref 7.2–63.3)

## 2025-07-15 ENCOUNTER — INFUSION (OUTPATIENT)
Dept: HEMATOLOGY/ONCOLOGY | Facility: CLINIC | Age: 65
End: 2025-07-15
Payer: COMMERCIAL

## 2025-07-15 VITALS
WEIGHT: 257.72 LBS | DIASTOLIC BLOOD PRESSURE: 92 MMHG | OXYGEN SATURATION: 95 % | RESPIRATION RATE: 17 BRPM | TEMPERATURE: 97.9 F | HEART RATE: 75 BPM | SYSTOLIC BLOOD PRESSURE: 139 MMHG | BODY MASS INDEX: 37.53 KG/M2

## 2025-07-15 DIAGNOSIS — C64.1 RENAL CELL CARCINOMA OF RIGHT KIDNEY: ICD-10-CM

## 2025-07-15 LAB
ALBUMIN SERPL BCP-MCNC: 4.1 G/DL (ref 3.4–5)
ALP SERPL-CCNC: 53 U/L (ref 33–136)
ALT SERPL W P-5'-P-CCNC: 14 U/L (ref 10–52)
ANION GAP SERPL CALC-SCNC: 15 MMOL/L (ref 10–20)
AST SERPL W P-5'-P-CCNC: 14 U/L (ref 9–39)
BASOPHILS # BLD AUTO: 0.04 X10*3/UL (ref 0–0.1)
BASOPHILS NFR BLD AUTO: 0.7 %
BILIRUB SERPL-MCNC: 0.8 MG/DL (ref 0–1.2)
BUN SERPL-MCNC: 20 MG/DL (ref 6–23)
CALCIUM SERPL-MCNC: 9.7 MG/DL (ref 8.6–10.3)
CHLORIDE SERPL-SCNC: 102 MMOL/L (ref 98–107)
CO2 SERPL-SCNC: 26 MMOL/L (ref 21–32)
CORTIS AM PEAK SERPL-MSCNC: 9.8 UG/DL (ref 5–20)
CREAT SERPL-MCNC: 1.42 MG/DL (ref 0.5–1.3)
EGFRCR SERPLBLD CKD-EPI 2021: 56 ML/MIN/1.73M*2
EOSINOPHIL # BLD AUTO: 0.17 X10*3/UL (ref 0–0.7)
EOSINOPHIL NFR BLD AUTO: 3 %
ERYTHROCYTE [DISTWIDTH] IN BLOOD BY AUTOMATED COUNT: 12.3 % (ref 11.5–14.5)
GLUCOSE SERPL-MCNC: 134 MG/DL (ref 74–99)
HCT VFR BLD AUTO: 47.4 % (ref 41–52)
HGB BLD-MCNC: 16.3 G/DL (ref 13.5–17.5)
IMM GRANULOCYTES # BLD AUTO: 0.01 X10*3/UL (ref 0–0.7)
IMM GRANULOCYTES NFR BLD AUTO: 0.2 % (ref 0–0.9)
LYMPHOCYTES # BLD AUTO: 1.02 X10*3/UL (ref 1.2–4.8)
LYMPHOCYTES NFR BLD AUTO: 18.2 %
MCH RBC QN AUTO: 29.6 PG (ref 26–34)
MCHC RBC AUTO-ENTMCNC: 34.4 G/DL (ref 32–36)
MCV RBC AUTO: 86 FL (ref 80–100)
MONOCYTES # BLD AUTO: 0.36 X10*3/UL (ref 0.1–1)
MONOCYTES NFR BLD AUTO: 6.4 %
NEUTROPHILS # BLD AUTO: 3.99 X10*3/UL (ref 1.2–7.7)
NEUTROPHILS NFR BLD AUTO: 71.5 %
PLATELET # BLD AUTO: 186 X10*3/UL (ref 150–450)
POTASSIUM SERPL-SCNC: 3.8 MMOL/L (ref 3.5–5.3)
PROT SERPL-MCNC: 7.1 G/DL (ref 6.4–8.2)
RBC # BLD AUTO: 5.51 X10*6/UL (ref 4.5–5.9)
SODIUM SERPL-SCNC: 139 MMOL/L (ref 136–145)
T4 FREE SERPL-MCNC: 0.97 NG/DL (ref 0.61–1.12)
TSH SERPL-ACNC: 0.35 MIU/L (ref 0.44–3.98)
WBC # BLD AUTO: 5.6 X10*3/UL (ref 4.4–11.3)

## 2025-07-15 PROCEDURE — 96413 CHEMO IV INFUSION 1 HR: CPT

## 2025-07-15 PROCEDURE — 2500000004 HC RX 250 GENERAL PHARMACY W/ HCPCS (ALT 636 FOR OP/ED): Performed by: INTERNAL MEDICINE

## 2025-07-15 PROCEDURE — 2500000001 HC RX 250 WO HCPCS SELF ADMINISTERED DRUGS (ALT 637 FOR MEDICARE OP): Performed by: INTERNAL MEDICINE

## 2025-07-15 PROCEDURE — 85025 COMPLETE CBC W/AUTO DIFF WBC: CPT

## 2025-07-15 PROCEDURE — 84439 ASSAY OF FREE THYROXINE: CPT

## 2025-07-15 PROCEDURE — 84443 ASSAY THYROID STIM HORMONE: CPT

## 2025-07-15 PROCEDURE — 82024 ASSAY OF ACTH: CPT

## 2025-07-15 PROCEDURE — 82533 TOTAL CORTISOL: CPT | Mod: GEALAB

## 2025-07-15 PROCEDURE — 96375 TX/PRO/DX INJ NEW DRUG ADDON: CPT | Mod: INF

## 2025-07-15 PROCEDURE — 84075 ASSAY ALKALINE PHOSPHATASE: CPT

## 2025-07-15 RX ORDER — DIPHENHYDRAMINE HYDROCHLORIDE 50 MG/ML
50 INJECTION, SOLUTION INTRAMUSCULAR; INTRAVENOUS AS NEEDED
Status: DISCONTINUED | OUTPATIENT
Start: 2025-07-15 | End: 2025-07-15 | Stop reason: HOSPADM

## 2025-07-15 RX ORDER — PROCHLORPERAZINE MALEATE 10 MG
10 TABLET ORAL EVERY 6 HOURS PRN
Status: DISCONTINUED | OUTPATIENT
Start: 2025-07-15 | End: 2025-07-15 | Stop reason: HOSPADM

## 2025-07-15 RX ORDER — ALBUTEROL SULFATE 0.83 MG/ML
3 SOLUTION RESPIRATORY (INHALATION) AS NEEDED
Status: DISCONTINUED | OUTPATIENT
Start: 2025-07-15 | End: 2025-07-15 | Stop reason: HOSPADM

## 2025-07-15 RX ORDER — LORAZEPAM 0.5 MG/1
0.5 TABLET ORAL ONCE
Status: COMPLETED | OUTPATIENT
Start: 2025-07-15 | End: 2025-07-15

## 2025-07-15 RX ORDER — PROCHLORPERAZINE EDISYLATE 5 MG/ML
10 INJECTION INTRAMUSCULAR; INTRAVENOUS EVERY 6 HOURS PRN
Status: DISCONTINUED | OUTPATIENT
Start: 2025-07-15 | End: 2025-07-15 | Stop reason: HOSPADM

## 2025-07-15 RX ORDER — HEPARIN SODIUM,PORCINE/PF 10 UNIT/ML
50 SYRINGE (ML) INTRAVENOUS AS NEEDED
Status: DISCONTINUED | OUTPATIENT
Start: 2025-07-15 | End: 2025-07-15 | Stop reason: HOSPADM

## 2025-07-15 RX ORDER — EPINEPHRINE 0.3 MG/.3ML
0.3 INJECTION SUBCUTANEOUS EVERY 5 MIN PRN
Status: DISCONTINUED | OUTPATIENT
Start: 2025-07-15 | End: 2025-07-15 | Stop reason: HOSPADM

## 2025-07-15 RX ORDER — FAMOTIDINE 10 MG/ML
20 INJECTION, SOLUTION INTRAVENOUS ONCE AS NEEDED
Status: DISCONTINUED | OUTPATIENT
Start: 2025-07-15 | End: 2025-07-15 | Stop reason: HOSPADM

## 2025-07-15 RX ORDER — HEPARIN 100 UNIT/ML
500 SYRINGE INTRAVENOUS AS NEEDED
OUTPATIENT
Start: 2025-07-15

## 2025-07-15 RX ORDER — HEPARIN SODIUM,PORCINE/PF 10 UNIT/ML
50 SYRINGE (ML) INTRAVENOUS AS NEEDED
OUTPATIENT
Start: 2025-07-15

## 2025-07-15 RX ORDER — HEPARIN 100 UNIT/ML
500 SYRINGE INTRAVENOUS AS NEEDED
Status: DISCONTINUED | OUTPATIENT
Start: 2025-07-15 | End: 2025-07-15 | Stop reason: HOSPADM

## 2025-07-15 RX ADMIN — SODIUM CHLORIDE 480 MG: 9 INJECTION, SOLUTION INTRAVENOUS at 12:44

## 2025-07-15 RX ADMIN — LORAZEPAM 0.5 MG: 0.5 TABLET ORAL at 12:09

## 2025-07-15 RX ADMIN — DEXAMETHASONE SODIUM PHOSPHATE 8 MG: 4 INJECTION INTRA-ARTICULAR; INTRALESIONAL; INTRAMUSCULAR; INTRAVENOUS; SOFT TISSUE at 12:09

## 2025-07-15 ASSESSMENT — PAIN SCALES - GENERAL: PAINLEVEL_OUTOF10: 0-NO PAIN

## 2025-07-15 NOTE — PROGRESS NOTES
Music Therapy Note    Jason Burns was referred by Breana Youssef, BRYSON, MT-BC.    Therapy Session  Visit Type: New visit  Session Start Time: 1023  Session End Time: 1117  Intervention Delivery: In-person  Conflict of Service: None  Family Present for Session: None  Number of staff members present: 2     Pre-assessment  Unable to Assess Reason: Outcomes not assessed (privacy - group setting)  Mood/Affect: Appropriate         Treatment/Interventions  Areas of Focus: Procedural support, Coping  Music Therapy Interventions: Live music listening  Interruption: No  Patient Fell Asleep at End of Session: No    Post-assessment  Unable to Assess Reason: Outcomes not evaluated (privacy - group setting)  Mood/Affect: Appropriate  Continue Visiting: Yes  Total Session Time (min): 54 minutes    Narrative  Assessment Detail: Pt joined group slightly after MT had begun. He was pleasant and easily engaged and offered recognition of MT as he was sitting down.  Plan: MT will provide music to promote a positive environment for coping with treatment.  Intervention: MT utilized piano to play a wide variety of music, including jazz standards, new age instrumental (Karen), easy listening, hymns, and music from the 1960s and 1970s. Pt observed tapping toes/swaying occasionally and engaging in independent leisure activities on his phone or reading. He denied any other needs at the conclusion of the session.  Evaluation: Pt appeared comfortable, relaxed, and in good spirits throughout.  Follow-up: MT will follow up as able and appropriate.    Education Documentation  No documentation found.

## 2025-07-15 NOTE — PROGRESS NOTES
Pt received nivolumab without complications. Educated on side effects. Reviewed labs and updated schedule. Pt left infusion facility in stable condition.

## 2025-07-16 LAB — ACTH PLAS-MCNC: 67.3 PG/ML (ref 7.2–63.3)

## 2025-08-05 LAB
COMMENTS - MP RESULT TYPE: NORMAL
SCAN RESULT: NORMAL

## 2025-08-12 ENCOUNTER — INFUSION (OUTPATIENT)
Dept: HEMATOLOGY/ONCOLOGY | Facility: CLINIC | Age: 65
End: 2025-08-12
Payer: COMMERCIAL

## 2025-08-12 ENCOUNTER — APPOINTMENT (OUTPATIENT)
Dept: HEMATOLOGY/ONCOLOGY | Facility: CLINIC | Age: 65
End: 2025-08-12
Payer: COMMERCIAL

## 2025-08-12 VITALS
SYSTOLIC BLOOD PRESSURE: 128 MMHG | RESPIRATION RATE: 16 BRPM | HEART RATE: 68 BPM | DIASTOLIC BLOOD PRESSURE: 78 MMHG | BODY MASS INDEX: 37.2 KG/M2 | WEIGHT: 255.5 LBS | TEMPERATURE: 97.5 F | OXYGEN SATURATION: 96 %

## 2025-08-12 DIAGNOSIS — C64.1 RENAL CELL CARCINOMA OF RIGHT KIDNEY: ICD-10-CM

## 2025-08-12 LAB
ALBUMIN SERPL BCP-MCNC: 4.1 G/DL (ref 3.4–5)
ALP SERPL-CCNC: 52 U/L (ref 33–136)
ALT SERPL W P-5'-P-CCNC: 16 U/L (ref 10–52)
ANION GAP SERPL CALC-SCNC: 11 MMOL/L (ref 10–20)
AST SERPL W P-5'-P-CCNC: 25 U/L (ref 9–39)
BASOPHILS # BLD AUTO: 0.05 X10*3/UL (ref 0–0.1)
BASOPHILS NFR BLD AUTO: 0.9 %
BILIRUB SERPL-MCNC: 0.6 MG/DL (ref 0–1.2)
BUN SERPL-MCNC: 23 MG/DL (ref 6–23)
CALCIUM SERPL-MCNC: 9.2 MG/DL (ref 8.6–10.3)
CHLORIDE SERPL-SCNC: 105 MMOL/L (ref 98–107)
CO2 SERPL-SCNC: 27 MMOL/L (ref 21–32)
CORTIS AM PEAK SERPL-MCNC: 8.8 UG/DL (ref 5–20)
CREAT SERPL-MCNC: 1.29 MG/DL (ref 0.5–1.3)
EGFRCR SERPLBLD CKD-EPI 2021: 62 ML/MIN/1.73M*2
EOSINOPHIL # BLD AUTO: 0.23 X10*3/UL (ref 0–0.7)
EOSINOPHIL NFR BLD AUTO: 4.1 %
ERYTHROCYTE [DISTWIDTH] IN BLOOD BY AUTOMATED COUNT: 13 % (ref 11.5–14.5)
GLUCOSE SERPL-MCNC: 134 MG/DL (ref 74–99)
HCT VFR BLD AUTO: 46.6 % (ref 41–52)
HGB BLD-MCNC: 15.9 G/DL (ref 13.5–17.5)
IMM GRANULOCYTES # BLD AUTO: 0.01 X10*3/UL (ref 0–0.7)
IMM GRANULOCYTES NFR BLD AUTO: 0.2 % (ref 0–0.9)
LYMPHOCYTES # BLD AUTO: 1.07 X10*3/UL (ref 1.2–4.8)
LYMPHOCYTES NFR BLD AUTO: 18.9 %
MCH RBC QN AUTO: 29.8 PG (ref 26–34)
MCHC RBC AUTO-ENTMCNC: 34.1 G/DL (ref 32–36)
MCV RBC AUTO: 87 FL (ref 80–100)
MONOCYTES # BLD AUTO: 0.48 X10*3/UL (ref 0.1–1)
MONOCYTES NFR BLD AUTO: 8.5 %
NEUTROPHILS # BLD AUTO: 3.81 X10*3/UL (ref 1.2–7.7)
NEUTROPHILS NFR BLD AUTO: 67.4 %
NRBC BLD-RTO: ABNORMAL /100{WBCS}
PLATELET # BLD AUTO: 177 X10*3/UL (ref 150–450)
POTASSIUM SERPL-SCNC: 4.9 MMOL/L (ref 3.5–5.3)
PROT SERPL-MCNC: 7 G/DL (ref 6.4–8.2)
RBC # BLD AUTO: 5.34 X10*6/UL (ref 4.5–5.9)
SODIUM SERPL-SCNC: 138 MMOL/L (ref 136–145)
WBC # BLD AUTO: 5.7 X10*3/UL (ref 4.4–11.3)

## 2025-08-12 PROCEDURE — 85025 COMPLETE CBC W/AUTO DIFF WBC: CPT

## 2025-08-12 PROCEDURE — 2500000004 HC RX 250 GENERAL PHARMACY W/ HCPCS (ALT 636 FOR OP/ED): Performed by: INTERNAL MEDICINE

## 2025-08-12 PROCEDURE — 96413 CHEMO IV INFUSION 1 HR: CPT

## 2025-08-12 PROCEDURE — 84075 ASSAY ALKALINE PHOSPHATASE: CPT

## 2025-08-12 PROCEDURE — 2500000001 HC RX 250 WO HCPCS SELF ADMINISTERED DRUGS (ALT 637 FOR MEDICARE OP): Performed by: INTERNAL MEDICINE

## 2025-08-12 PROCEDURE — 96375 TX/PRO/DX INJ NEW DRUG ADDON: CPT | Mod: INF

## 2025-08-12 PROCEDURE — 82024 ASSAY OF ACTH: CPT

## 2025-08-12 PROCEDURE — 82533 TOTAL CORTISOL: CPT | Mod: GEALAB

## 2025-08-12 RX ORDER — HEPARIN SODIUM,PORCINE/PF 10 UNIT/ML
50 SYRINGE (ML) INTRAVENOUS AS NEEDED
OUTPATIENT
Start: 2025-08-12

## 2025-08-12 RX ORDER — HEPARIN 100 UNIT/ML
500 SYRINGE INTRAVENOUS AS NEEDED
OUTPATIENT
Start: 2025-08-12

## 2025-08-12 RX ORDER — DIPHENHYDRAMINE HYDROCHLORIDE 50 MG/ML
50 INJECTION, SOLUTION INTRAMUSCULAR; INTRAVENOUS AS NEEDED
Status: DISCONTINUED | OUTPATIENT
Start: 2025-08-12 | End: 2025-08-12 | Stop reason: HOSPADM

## 2025-08-12 RX ORDER — ALBUTEROL SULFATE 0.83 MG/ML
3 SOLUTION RESPIRATORY (INHALATION) AS NEEDED
Status: DISCONTINUED | OUTPATIENT
Start: 2025-08-12 | End: 2025-08-12 | Stop reason: HOSPADM

## 2025-08-12 RX ORDER — LORAZEPAM 0.5 MG/1
0.5 TABLET ORAL ONCE
Status: COMPLETED | OUTPATIENT
Start: 2025-08-12 | End: 2025-08-12

## 2025-08-12 RX ORDER — PROCHLORPERAZINE MALEATE 10 MG
10 TABLET ORAL EVERY 6 HOURS PRN
Status: DISCONTINUED | OUTPATIENT
Start: 2025-08-12 | End: 2025-08-12 | Stop reason: HOSPADM

## 2025-08-12 RX ORDER — FAMOTIDINE 10 MG/ML
20 INJECTION, SOLUTION INTRAVENOUS ONCE AS NEEDED
Status: DISCONTINUED | OUTPATIENT
Start: 2025-08-12 | End: 2025-08-12 | Stop reason: HOSPADM

## 2025-08-12 RX ORDER — PROCHLORPERAZINE EDISYLATE 5 MG/ML
10 INJECTION INTRAMUSCULAR; INTRAVENOUS EVERY 6 HOURS PRN
Status: DISCONTINUED | OUTPATIENT
Start: 2025-08-12 | End: 2025-08-12 | Stop reason: HOSPADM

## 2025-08-12 RX ORDER — EPINEPHRINE 0.3 MG/.3ML
0.3 INJECTION SUBCUTANEOUS EVERY 5 MIN PRN
Status: DISCONTINUED | OUTPATIENT
Start: 2025-08-12 | End: 2025-08-12 | Stop reason: HOSPADM

## 2025-08-12 RX ADMIN — DEXAMETHASONE SODIUM PHOSPHATE 8 MG: 4 INJECTION, SOLUTION INTRA-ARTICULAR; INTRALESIONAL; INTRAMUSCULAR; INTRAVENOUS; SOFT TISSUE at 11:34

## 2025-08-12 RX ADMIN — LORAZEPAM 0.5 MG: 0.5 TABLET ORAL at 11:34

## 2025-08-12 RX ADMIN — SODIUM CHLORIDE 480 MG: 9 INJECTION, SOLUTION INTRAVENOUS at 12:24

## 2025-08-12 ASSESSMENT — PAIN SCALES - GENERAL: PAINLEVEL_OUTOF10: 0-NO PAIN

## 2025-08-14 LAB — ACTH PLAS-MCNC: 21.2 PG/ML (ref 7.2–63.3)

## 2025-12-02 ENCOUNTER — APPOINTMENT (OUTPATIENT)
Dept: HEMATOLOGY/ONCOLOGY | Facility: CLINIC | Age: 65
End: 2025-12-02
Payer: COMMERCIAL